# Patient Record
Sex: MALE | Race: BLACK OR AFRICAN AMERICAN | NOT HISPANIC OR LATINO | Employment: FULL TIME | URBAN - METROPOLITAN AREA
[De-identification: names, ages, dates, MRNs, and addresses within clinical notes are randomized per-mention and may not be internally consistent; named-entity substitution may affect disease eponyms.]

---

## 2017-08-29 ENCOUNTER — OFFICE VISIT (OUTPATIENT)
Dept: ENDOCRINOLOGY | Facility: CLINIC | Age: 57
End: 2017-08-29
Payer: COMMERCIAL

## 2017-08-29 ENCOUNTER — LAB VISIT (OUTPATIENT)
Dept: LAB | Facility: HOSPITAL | Age: 57
End: 2017-08-29
Payer: COMMERCIAL

## 2017-08-29 ENCOUNTER — PATIENT MESSAGE (OUTPATIENT)
Dept: ENDOCRINOLOGY | Facility: CLINIC | Age: 57
End: 2017-08-29

## 2017-08-29 VITALS
BODY MASS INDEX: 33.49 KG/M2 | SYSTOLIC BLOOD PRESSURE: 126 MMHG | HEIGHT: 78 IN | HEART RATE: 64 BPM | DIASTOLIC BLOOD PRESSURE: 80 MMHG | WEIGHT: 289.44 LBS

## 2017-08-29 DIAGNOSIS — C73 MALIGNANT NEOPLASM OF THYROID GLAND: ICD-10-CM

## 2017-08-29 DIAGNOSIS — E89.0 POST-SURGICAL HYPOTHYROIDISM: Primary | ICD-10-CM

## 2017-08-29 DIAGNOSIS — E66.9 OBESITY (BMI 30.0-34.9): ICD-10-CM

## 2017-08-29 DIAGNOSIS — E89.0 POST-SURGICAL HYPOTHYROIDISM: ICD-10-CM

## 2017-08-29 PROBLEM — E66.811 OBESITY (BMI 30.0-34.9): Status: ACTIVE | Noted: 2017-08-29

## 2017-08-29 LAB
T4 FREE SERPL-MCNC: 1.01 NG/DL
TSH SERPL DL<=0.005 MIU/L-ACNC: 5.29 UIU/ML

## 2017-08-29 PROCEDURE — 99999 PR PBB SHADOW E&M-EST. PATIENT-LVL III: CPT | Mod: PBBFAC,,, | Performed by: INTERNAL MEDICINE

## 2017-08-29 PROCEDURE — 86800 THYROGLOBULIN ANTIBODY: CPT

## 2017-08-29 PROCEDURE — 99214 OFFICE O/P EST MOD 30 MIN: CPT | Mod: S$GLB,,, | Performed by: INTERNAL MEDICINE

## 2017-08-29 PROCEDURE — 84439 ASSAY OF FREE THYROXINE: CPT

## 2017-08-29 PROCEDURE — 36415 COLL VENOUS BLD VENIPUNCTURE: CPT

## 2017-08-29 PROCEDURE — 84443 ASSAY THYROID STIM HORMONE: CPT

## 2017-08-29 PROCEDURE — 3008F BODY MASS INDEX DOCD: CPT | Mod: S$GLB,,, | Performed by: INTERNAL MEDICINE

## 2017-08-29 NOTE — PROGRESS NOTES
"Subjective:      Patient ID: Zaheer Neal is a 57 y.o. male.    Chief Complaint: Thyroid Cancer     is presenting for follow up of thyroid cancer, post-surgical hypothyroidism.    Last seen in 4/2016 by Lily José was endocrinologist in [facility]  Presented with thyroid nodule found on PE     He was diagnosed with thyroid cancer 12/2011 [place]       Age of dx 51  Unifocal right lobe    3.5x 2.0x 1.5 cm    Classical PTC    Margins uninvolved    extrathyroidal extension; present near flap of skeletal muscle  No LN looked at pNx   pT3, Nx, Mx       S/p I131 153 mci 4/2012 4/25/12  Post i131 scan focal uptake right thyroid bed    focus sup midline neck     Current symptoms    No Weight gain  No Heat/ Cold intolerance    No diarrhea/ Constipation     current medication lt4 137 mcg brand name synthroid (hives with generic)  Takes it properly          Denies polyuria, polydipsia, nocturia,   Gain 8-10 lbs over the past few years  No palpitations  No night sweats          3/18/15 u/s    Status post thyroidectomy.  No sonographic evidence of malignancy.      Works for [employer] and [employer]  Walks 2 miles per day    Review of Systems   Constitutional: Negative for unexpected weight change.   Eyes: Negative for visual disturbance.   Respiratory: Negative for shortness of breath.    Cardiovascular: Negative for chest pain.   Gastrointestinal: Negative for abdominal pain.   Musculoskeletal: Negative for myalgias.   Skin: Negative for wound.   Neurological: Negative for headaches.   Hematological: Does not bruise/bleed easily.   Psychiatric/Behavioral: Negative for sleep disturbance.       Objective:     Ht 6' 6" (1.981 m)   Wt 131.3 kg (289 lb 7.4 oz)   BMI 33.45 kg/m²      Physical Exam   Constitutional: He appears well-developed and well-nourished.   Neck: Normal range of motion. No thyromegaly present.   Cardiovascular: Normal rate and regular rhythm.  "   Skin: Skin is warm and dry.   Vitals reviewed.      Assessment:     1. Post-surgical hypothyroidism    2. Malignant neoplasm of thyroid gland    3. Obesity (BMI 30.0-34.9)        Plan:   1 Stage 3 Thyroid cancer- s/p surgery and i131 therapy   DEBORAH intermediate risk given extra thyroidal extension  Recheck tsh and thyroglobulin  tsh goal 0.5-1.0  Repeat thyroid US     2 Postsurgical hypothyroidism -    Goal is a low -normal TSH  Avoid overt exogenous hyperthyroidism as this can accelerate bone loss and increase risk of CV complications.     3  Obesity - alerted as to the increased risk for t2DM  Stressed diet and exercise  Periodic hba1c levels per pcp         Follow-up in 1 year    Discussed with Dr. Eid    Send synthroid  To [facility]  90 day refill    Rick Jha MD

## 2017-08-30 ENCOUNTER — TELEPHONE (OUTPATIENT)
Dept: ENDOCRINOLOGY | Facility: CLINIC | Age: 57
End: 2017-08-30

## 2017-08-30 ENCOUNTER — HOSPITAL ENCOUNTER (OUTPATIENT)
Dept: ENDOCRINOLOGY | Facility: CLINIC | Age: 57
Discharge: HOME | End: 2017-08-30
Attending: INTERNAL MEDICINE
Payer: COMMERCIAL

## 2017-08-30 DIAGNOSIS — C73 MALIGNANT NEOPLASM OF THYROID GLAND: ICD-10-CM

## 2017-08-30 DIAGNOSIS — E89.0 POST-SURGICAL HYPOTHYROIDISM: ICD-10-CM

## 2017-08-30 DIAGNOSIS — E89.0 POST-SURGICAL HYPOTHYROIDISM: Primary | ICD-10-CM

## 2017-08-30 LAB
THRYOGLOBULIN INTERPRETATION: NORMAL
THYROGLOB AB SERPL-ACNC: <1.8 IU/ML
THYROGLOB SERPL-MCNC: <0.1 NG/ML

## 2017-08-30 PROCEDURE — 76536 US EXAM OF HEAD AND NECK: CPT | Mod: S$GLB,,, | Performed by: INTERNAL MEDICINE

## 2017-08-30 RX ORDER — LEVOTHYROXINE SODIUM 150 UG/1
150 TABLET ORAL DAILY
Qty: 90 TABLET | Refills: 3 | Status: SHIPPED | OUTPATIENT
Start: 2017-08-30 | End: 2018-03-07

## 2017-09-04 NOTE — PROGRESS NOTES
I, Tara Eid, have personally taken the history and physical exam and I agree with Dr. Jha's assessment and plan

## 2017-09-26 ENCOUNTER — CLINICAL SUPPORT (OUTPATIENT)
Dept: OTHER | Facility: CLINIC | Age: 57
End: 2017-09-26
Payer: COMMERCIAL

## 2017-09-26 VITALS
DIASTOLIC BLOOD PRESSURE: 75 MMHG | SYSTOLIC BLOOD PRESSURE: 116 MMHG | HEIGHT: 78 IN | BODY MASS INDEX: 32.86 KG/M2 | WEIGHT: 284 LBS

## 2017-09-26 DIAGNOSIS — Z00.8 HEALTH EXAMINATION IN POPULATION SURVEYS: Primary | ICD-10-CM

## 2017-09-26 LAB
GLUCOSE SERPL-MCNC: NORMAL MG/DL (ref 60–140)
POC CHOLESTEROL, HDL: 44 MG/DL (ref 40–?)
POC CHOLESTEROL, LDL: 102 MG/DL (ref ?–160)
POC CHOLESTEROL, TOTAL: 172 MG/DL (ref ?–240)
POC GLUCOSE FASTING: 97 MG/DL (ref 60–110)
POC TOTAL CHOLESTEROL / HDL RATIO: 3.91 (ref ?–6)
POC TRIGLYCERIDES: 126 MG/DL (ref ?–160)

## 2017-09-26 PROCEDURE — 80061 LIPID PANEL: CPT | Mod: QW,S$GLB,, | Performed by: INTERNAL MEDICINE

## 2017-09-26 PROCEDURE — 99401 PREV MED CNSL INDIV APPRX 15: CPT | Mod: S$GLB,,, | Performed by: INTERNAL MEDICINE

## 2017-09-26 PROCEDURE — 82947 ASSAY GLUCOSE BLOOD QUANT: CPT | Mod: QW,S$GLB,, | Performed by: INTERNAL MEDICINE

## 2017-10-11 ENCOUNTER — LAB VISIT (OUTPATIENT)
Dept: LAB | Facility: HOSPITAL | Age: 57
End: 2017-10-11
Attending: INTERNAL MEDICINE
Payer: COMMERCIAL

## 2017-10-11 DIAGNOSIS — E89.0 POST-SURGICAL HYPOTHYROIDISM: ICD-10-CM

## 2017-10-11 LAB — TSH SERPL DL<=0.005 MIU/L-ACNC: 1.63 UIU/ML

## 2017-10-11 PROCEDURE — 84443 ASSAY THYROID STIM HORMONE: CPT | Mod: PO

## 2017-10-11 PROCEDURE — 36415 COLL VENOUS BLD VENIPUNCTURE: CPT | Mod: PO

## 2017-12-12 ENCOUNTER — LAB VISIT (OUTPATIENT)
Dept: LAB | Facility: HOSPITAL | Age: 57
End: 2017-12-12
Attending: FAMILY MEDICINE
Payer: COMMERCIAL

## 2017-12-12 ENCOUNTER — OFFICE VISIT (OUTPATIENT)
Dept: INTERNAL MEDICINE | Facility: CLINIC | Age: 57
End: 2017-12-12
Attending: FAMILY MEDICINE
Payer: COMMERCIAL

## 2017-12-12 VITALS
BODY MASS INDEX: 31.89 KG/M2 | WEIGHT: 275.63 LBS | SYSTOLIC BLOOD PRESSURE: 124 MMHG | HEIGHT: 78 IN | DIASTOLIC BLOOD PRESSURE: 82 MMHG

## 2017-12-12 DIAGNOSIS — Z00.00 ANNUAL PHYSICAL EXAM: Primary | ICD-10-CM

## 2017-12-12 DIAGNOSIS — Z12.5 PROSTATE CANCER SCREENING: ICD-10-CM

## 2017-12-12 DIAGNOSIS — E89.0 POST-SURGICAL HYPOTHYROIDISM: ICD-10-CM

## 2017-12-12 DIAGNOSIS — C73 MALIGNANT NEOPLASM OF THYROID GLAND: ICD-10-CM

## 2017-12-12 DIAGNOSIS — Z12.11 COLON CANCER SCREENING: ICD-10-CM

## 2017-12-12 DIAGNOSIS — Z00.00 ANNUAL PHYSICAL EXAM: ICD-10-CM

## 2017-12-12 LAB
ANION GAP SERPL CALC-SCNC: 7 MMOL/L
BILIRUB UR QL STRIP: NEGATIVE
BUN SERPL-MCNC: 13 MG/DL
CALCIUM SERPL-MCNC: 9.6 MG/DL
CHLORIDE SERPL-SCNC: 104 MMOL/L
CHOLEST SERPL-MCNC: 160 MG/DL
CHOLEST/HDLC SERPL: 4 {RATIO}
CLARITY UR REFRACT.AUTO: ABNORMAL
CO2 SERPL-SCNC: 30 MMOL/L
COLOR UR AUTO: YELLOW
COMPLEXED PSA SERPL-MCNC: 0.72 NG/ML
CREAT SERPL-MCNC: 1.2 MG/DL
EST. GFR  (AFRICAN AMERICAN): >60 ML/MIN/1.73 M^2
EST. GFR  (NON AFRICAN AMERICAN): >60 ML/MIN/1.73 M^2
GLUCOSE SERPL-MCNC: 103 MG/DL
GLUCOSE UR QL STRIP: NEGATIVE
HDLC SERPL-MCNC: 40 MG/DL
HDLC SERPL: 25 %
HGB UR QL STRIP: NEGATIVE
KETONES UR QL STRIP: NEGATIVE
LDLC SERPL CALC-MCNC: 100.2 MG/DL
LEUKOCYTE ESTERASE UR QL STRIP: NEGATIVE
MICROSCOPIC COMMENT: NORMAL
NITRITE UR QL STRIP: NEGATIVE
NONHDLC SERPL-MCNC: 120 MG/DL
PH UR STRIP: 7 [PH] (ref 5–8)
POTASSIUM SERPL-SCNC: 4.7 MMOL/L
PROT UR QL STRIP: NEGATIVE
SODIUM SERPL-SCNC: 141 MMOL/L
SP GR UR STRIP: 1.02 (ref 1–1.03)
TRIGL SERPL-MCNC: 99 MG/DL
URN SPEC COLLECT METH UR: ABNORMAL
UROBILINOGEN UR STRIP-ACNC: NEGATIVE EU/DL

## 2017-12-12 PROCEDURE — 99999 PR PBB SHADOW E&M-EST. PATIENT-LVL III: CPT | Mod: PBBFAC,,, | Performed by: FAMILY MEDICINE

## 2017-12-12 PROCEDURE — 99396 PREV VISIT EST AGE 40-64: CPT | Mod: S$GLB,,, | Performed by: FAMILY MEDICINE

## 2017-12-12 PROCEDURE — 36415 COLL VENOUS BLD VENIPUNCTURE: CPT

## 2017-12-12 PROCEDURE — 80061 LIPID PANEL: CPT

## 2017-12-12 PROCEDURE — 80048 BASIC METABOLIC PNL TOTAL CA: CPT

## 2017-12-12 PROCEDURE — 81001 URINALYSIS AUTO W/SCOPE: CPT

## 2017-12-12 PROCEDURE — 84153 ASSAY OF PSA TOTAL: CPT

## 2017-12-12 NOTE — PROGRESS NOTES
Subjective:       Patient ID: Zaheer Neal is a 57 y.o. male.    Chief Complaint: Annual Exam    Established patient for an annual wellness check/physical exam. No specific complaints, please see ROS.        Review of Systems   Constitutional: Negative for activity change, chills, fatigue, fever and unexpected weight change.   HENT: Negative for congestion, hearing loss, rhinorrhea and trouble swallowing.    Eyes: Negative for discharge, redness and visual disturbance.   Respiratory: Negative for cough, chest tightness, shortness of breath and wheezing.    Cardiovascular: Negative for chest pain, palpitations and leg swelling.   Gastrointestinal: Negative for abdominal pain, blood in stool, constipation, diarrhea and vomiting.   Endocrine: Positive for polyuria. Negative for polydipsia.   Genitourinary: Negative for difficulty urinating, hematuria and urgency.   Musculoskeletal: Positive for arthralgias. Negative for back pain, gait problem, joint swelling, myalgias and neck pain.   Skin: Negative for color change and rash.   Neurological: Negative for tremors, speech difficulty, weakness, numbness and headaches.   Hematological: Negative for adenopathy. Does not bruise/bleed easily.   Psychiatric/Behavioral: Negative for behavioral problems, confusion, dysphoric mood and sleep disturbance. The patient is not nervous/anxious.        Objective:      Physical Exam   Constitutional: He appears well-developed and well-nourished.   HENT:   Head: Normocephalic and atraumatic.   Right Ear: Tympanic membrane, external ear and ear canal normal.   Left Ear: Tympanic membrane, external ear and ear canal normal.   Mouth/Throat: Oropharynx is clear and moist.   Eyes: Pupils are equal, round, and reactive to light. No scleral icterus.   Neck: Normal range of motion. Neck supple. Carotid bruit is not present. No thyromegaly present.   Cardiovascular: Normal rate, regular rhythm, normal heart sounds and intact distal  pulses.  Exam reveals no gallop and no friction rub.    No murmur heard.  Pulmonary/Chest: Effort normal and breath sounds normal.   Abdominal: Soft. Bowel sounds are normal. He exhibits no distension and no mass. There is no tenderness. There is no rebound and no guarding. Hernia confirmed negative in the right inguinal area and confirmed negative in the left inguinal area.   Genitourinary: Rectum normal, prostate normal, testes normal and penis normal. Right testis shows no mass and no tenderness. Left testis shows no mass and no tenderness.   Musculoskeletal: Normal range of motion. He exhibits no edema or tenderness.   Lymphadenopathy:     He has no cervical adenopathy.        Right: No inguinal adenopathy present.        Left: No inguinal adenopathy present.   Neurological: He is alert. He has normal strength. He displays normal reflexes. No cranial nerve deficit or sensory deficit. Coordination and gait normal.   Skin: Skin is warm and dry.   Psychiatric: He has a normal mood and affect. His behavior is normal. Judgment and thought content normal.   Nursing note and vitals reviewed.      Assessment:       1. Annual physical exam    2. Prostate cancer screening    3. Colon cancer screening    4. Malignant neoplasm of thyroid gland    5. Post-surgical hypothyroidism        Plan:   Zaheer was seen today for annual exam.    Diagnoses and all orders for this visit:    Annual physical exam  -     PSA, Screening; Future  -     Basic metabolic panel; Future  -     Lipid panel; Future  -     Urinalysis Microscopic  -     Urinalysis    Prostate cancer screening  -     PSA, Screening; Future    Colon cancer screening  -     Case request GI: COLONOSCOPY    Malignant neoplasm of thyroid gland    Post-surgical hypothyroidism  Comments:  followed in endocrinology - recent labs noted      See meds, orders, follow up, routing and instructions sections of encounter.

## 2018-01-08 DIAGNOSIS — Z12.11 SPECIAL SCREENING FOR MALIGNANT NEOPLASMS, COLON: Primary | ICD-10-CM

## 2018-01-08 RX ORDER — POLYETHYLENE GLYCOL 3350, SODIUM SULFATE ANHYDROUS, SODIUM BICARBONATE, SODIUM CHLORIDE, POTASSIUM CHLORIDE 236; 22.74; 6.74; 5.86; 2.97 G/4L; G/4L; G/4L; G/4L; G/4L
4 POWDER, FOR SOLUTION ORAL ONCE
Qty: 4000 ML | Refills: 0 | Status: SHIPPED | OUTPATIENT
Start: 2018-01-08 | End: 2018-01-08

## 2018-02-05 ENCOUNTER — HOSPITAL ENCOUNTER (OUTPATIENT)
Facility: HOSPITAL | Age: 58
Discharge: HOME | End: 2018-02-05
Attending: COLON & RECTAL SURGERY | Admitting: COLON & RECTAL SURGERY
Payer: COMMERCIAL

## 2018-02-05 ENCOUNTER — ANESTHESIA (OUTPATIENT)
Dept: ENDOSCOPY | Facility: HOSPITAL | Age: 58
End: 2018-02-05
Payer: COMMERCIAL

## 2018-02-05 ENCOUNTER — ANESTHESIA EVENT (OUTPATIENT)
Dept: ENDOSCOPY | Facility: HOSPITAL | Age: 58
End: 2018-02-05
Payer: COMMERCIAL

## 2018-02-05 ENCOUNTER — SURGERY (OUTPATIENT)
Age: 58
End: 2018-02-05

## 2018-02-05 VITALS
DIASTOLIC BLOOD PRESSURE: 70 MMHG | HEART RATE: 66 BPM | BODY MASS INDEX: 32.17 KG/M2 | SYSTOLIC BLOOD PRESSURE: 128 MMHG | TEMPERATURE: 98 F | OXYGEN SATURATION: 98 % | RESPIRATION RATE: 18 BRPM | WEIGHT: 278 LBS | HEIGHT: 78 IN

## 2018-02-05 DIAGNOSIS — Z12.11 SCREENING FOR COLON CANCER: ICD-10-CM

## 2018-02-05 PROCEDURE — D9220A PRA ANESTHESIA: Mod: ANES,,, | Performed by: ANESTHESIOLOGY

## 2018-02-05 PROCEDURE — 37000008 HC ANESTHESIA 1ST 15 MINUTES: Performed by: COLON & RECTAL SURGERY

## 2018-02-05 PROCEDURE — 37000009 HC ANESTHESIA EA ADD 15 MINS: Performed by: COLON & RECTAL SURGERY

## 2018-02-05 PROCEDURE — 25000003 PHARM REV CODE 250: Performed by: NURSE PRACTITIONER

## 2018-02-05 PROCEDURE — G0105 COLORECTAL SCRN; HI RISK IND: HCPCS | Mod: ,,, | Performed by: COLON & RECTAL SURGERY

## 2018-02-05 PROCEDURE — 63600175 PHARM REV CODE 636 W HCPCS: Performed by: NURSE ANESTHETIST, CERTIFIED REGISTERED

## 2018-02-05 PROCEDURE — D9220A PRA ANESTHESIA: Mod: CRNA,,, | Performed by: NURSE ANESTHETIST, CERTIFIED REGISTERED

## 2018-02-05 PROCEDURE — G0105 COLORECTAL SCRN; HI RISK IND: HCPCS | Performed by: COLON & RECTAL SURGERY

## 2018-02-05 RX ORDER — LIDOCAINE HCL/PF 100 MG/5ML
SYRINGE (ML) INTRAVENOUS
Status: DISCONTINUED | OUTPATIENT
Start: 2018-02-05 | End: 2018-02-05

## 2018-02-05 RX ORDER — SODIUM CHLORIDE 9 MG/ML
INJECTION, SOLUTION INTRAVENOUS CONTINUOUS
Status: DISCONTINUED | OUTPATIENT
Start: 2018-02-05 | End: 2018-02-05 | Stop reason: HOSPADM

## 2018-02-05 RX ORDER — PROPOFOL 10 MG/ML
VIAL (ML) INTRAVENOUS
Status: DISCONTINUED | OUTPATIENT
Start: 2018-02-05 | End: 2018-02-05

## 2018-02-05 RX ADMIN — PROPOFOL 50 MG: 10 INJECTION, EMULSION INTRAVENOUS at 09:02

## 2018-02-05 RX ADMIN — LIDOCAINE HYDROCHLORIDE 50 MG: 20 INJECTION, SOLUTION INTRAVENOUS at 09:02

## 2018-02-05 RX ADMIN — PROPOFOL 25 MG: 10 INJECTION, EMULSION INTRAVENOUS at 09:02

## 2018-02-05 RX ADMIN — SODIUM CHLORIDE: 0.9 INJECTION, SOLUTION INTRAVENOUS at 08:02

## 2018-02-05 NOTE — PROVATION PATIENT INSTRUCTIONS
Discharge Summary/Instructions after an Endoscopic Procedure  Patient Name: Zaheer Neal  Patient MRN: [id number]  Patient YOB: 1960 Monday, February 05, 2018  Philip Hitchcock MD  RESTRICTIONS:  During your procedure today, you received medications for sedation.  These   medications may affect your judgment, balance and coordination.  Therefore,   for 24 hours, you have the following restrictions:   - DO NOT drive a car, operate machinery, make legal/financial decisions,   sign important papers or drink alcohol.    ACTIVITY:  The following day: return to full activity including work, except no heavy   lifting, straining or running for 3 days if polyps were removed.  DIET:  Eat and drink normally unless instructed otherwise.     TREATMENT FOR COMMON SIDE EFFECTS:  - Mild abdominal pain, belching, bloating or excessive gas: rest, eat   lightly and use a heating pad.  - Sore Throat: treat with throat lozenges and/or gargle with warm salt   water.  SYMPTOMS TO WATCH FOR AND REPORT TO YOUR PHYSICIAN:  1. Abdominal pain or bloating, other than gas cramps.  2. Chest pain.  3. Back pain.  4. Chills or fever occurring within 24 hours after the procedure.  5. Rectal bleeding, which would show as bright red, maroon, or black stools.   (A tablespoon of blood from the rectum is not serious, especially if   hemorrhoids are present.)  6. Vomiting.  7. Weakness or dizziness.  8. Because air was used during the procedure, expelling large amounts of air   from your rectum or belching is normal.  9. If a bowel prep was taken, you may not have a bowel movement for 1-3   days.  This is normal.  GO DIRECTLY TO THE NEAREST EMERGENCY ROOM IF YOU HAVE ANY OF THE FOLLOWING:      Difficulty breathing  Chills and/or fever over 101 F   Persistent vomiting and/or vomiting blood   Severe abdominal pain   Severe chest pain   Black, tarry stools   Bleeding- more than one tablespoon   Any other symptom or condition that you may feel needs  urgent attention  Your doctor recommends these additional instructions:  If any biopsies were taken, your doctor s clinic will contact you in 1 to 2   weeks with any results.  You are being discharged to home.   You have a contact number available for emergencies.  The signs and symptoms   of potential delayed complications were discussed with you.  You may return   to normal activities tomorrow.  Written discharge instructions were   provided to you.   Eat a high fiber diet for the rest of your life.   Continue your present medications.   Your physician has recommended a repeat colonoscopy in five years for   surveillance.  For questions, problems or results please call your physician - Philip Hitchcock MD at Work:  [phone].  [facility], EMERGENCY ROOM PHONE NUMBER: [phone]  IF A COMPLICATION OR EMERGENCY SITUATION ARISES AND YOU ARE UNABLE TO REACH   YOUR PHYSICIAN - GO DIRECTLY TO THE EMERGENCY ROOM.  Philip Hitchcock MD  2/5/2018 9:57:03 AM  This report has been verified and signed electronically.

## 2018-02-05 NOTE — DISCHARGE INSTRUCTIONS
Colonoscopy     A camera attached to a flexible tube with a viewing lens is used to take video pictures.     Colonoscopy is a test to view the inside of your lower digestive tract (colon and rectum). Sometimes it can show the last part of the small intestine (ileum). During the test, small pieces of tissue may be removed for testing. This is called a biopsy. Small growths, such as polyps, may also be removed.   Why is colonoscopy done?  The test is done to help look for colon cancer. And it can help find the source of abdominal pain, bleeding, and changes in bowel habits. It may be needed once a year, depending on factors such as your:  · Age  · Health history  · Family health history  · Symptoms  · Results from any prior colonoscopy  Risks and possible complications  These include:  · Bleeding               · A puncture or tear in the colon   · Risks of anesthesia  · A cancer lesion not being seen  Getting ready   To prepare for the test:  · Talk with your healthcare provider about the risks of the test (see below). Also ask your healthcare provider about alternatives to the test.  · Tell your healthcare provider about any medicines you take. Also tell him or her about any health conditions you may have.  · Make sure your rectum and colon are empty for the test. Follow the diet and bowel prep instructions exactly. If you dont, the test may need to be rescheduled.  · Plan for a friend or family member to drive you home after the test.     Colonoscopy provides an inside view of the entire colon.     You may discuss the results with your doctor right away or at a future visit.  During the test   The test is usually done in the hospital on an outpatient basis. This means you go home the same day. The procedure takes about 30 minutes. During that time:  · You are given relaxing (sedating) medicine through an IV line. You may be drowsy, or fully asleep.  · The healthcare provider will first give you a physical exam to  check for anal and rectal problems.  · Then the anus is lubricated and the scope inserted.  · If you are awake, you may have a feeling similar to needing to have a bowel movement. You may also feel pressure as air is pumped into the colon. Its OK to pass gas during the procedure.  · Biopsy, polyp removal, or other treatments may be done during the test.  After the test   You may have gas right after the test. It can help to try to pass it to help prevent later bloating. Your healthcare provider may discuss the results with you right away. Or you may need to schedule a follow-up visit to talk about the results. After the test, you can go back to your normal eating and other activities. You may be tired from the sedation and need to rest for a few hours.  Date Last Reviewed: 11/1/2016 © 2000-2017 [employer]. [address] [place] [address], [place] [address]. All rights reserved. This information is not intended as a substitute for professional medical care. Always follow your healthcare professional's instructions.

## 2018-02-05 NOTE — H&P
Endoscopy H&P    Procedure : Colonoscopy      asymptomatic screening exam and most recent endoscopic exam 10 years ago in [place] - history of benign polyps. No family history. Previous LGIB and AVM with cauterization. No recent bleeding.       Past Medical History:   Diagnosis Date    Colonic polyp     Malignant neoplasm of thyroid gland     thyroid    Obesity     Post-surgical hypothyroidism     PUD (peptic ulcer disease)     Thyroid cancer     Thyroid cancer      Sedation Problems: NO  Family History   Problem Relation Age of Onset    Breast cancer Mother     Cancer Mother      breast    Breast cancer Sister     Cancer Sister      breast    Heart disease Father 64     MI    Diabetes Neg Hx     Thyroid cancer Neg Hx      Fam Hx of Sedation Problems: NO  Social History     Social History    Marital status:      Spouse name: Aubrie    Number of children: 2    Years of education: N/A     Occupational History    Not on file.     Social History Main Topics    Smoking status: Never Smoker    Smokeless tobacco: Never Used    Alcohol use No      Comment: rarely     Drug use: No    Sexual activity: Yes     Partners: Female     Other Topics Concern    Not on file     Social History Narrative    RM x 3, 2 kids,  [place] [employer], played [employer]. [employer] for college. Born in [facility]       Review of Systems - Negative    Respiratory ROS: no cough, shortness of breath, or wheezing  Cardiovascular ROS: no chest pain or dyspnea on exertion  Gastrointestinal ROS: no abdominal pain, change in bowel habits, or black or bloody stools  Musculoskeletal ROS: negative  Neurological ROS: no TIA or stroke symptoms        Physical Exam:  General: no distress  Head: normocephalic  Airway:  normal oropharynx, airway normal  Neck: supple, symmetrical, trachea midline  Lungs:  normal respiratory effort  Heart:  regular rate and rhythm, S1, S2 normal, no murmur, rub or gallop  Abdomen: soft, non-tender non-distented; bowel sounds normal; no masses,  no organomegaly  Extremities: no cyanosis or edema, or clubbing       Deep Sedation: Mallampati Score per anesthesia     SedationPlan :Gen     ASA : II

## 2018-02-05 NOTE — ANESTHESIA POSTPROCEDURE EVALUATION
"Anesthesia Post Evaluation    Patient: Zaheer Neal    Procedure(s) Performed: Procedure(s) (LRB):  COLONOSCOPY (N/A)    Final Anesthesia Type: general  Patient location during evaluation: PACU  Patient participation: Yes- Able to Participate  Level of consciousness: awake and alert and oriented  Post-procedure vital signs: reviewed and stable  Pain management: adequate  Airway patency: patent  PONV status at discharge: No PONV  Anesthetic complications: no      Cardiovascular status: hemodynamically stable  Respiratory status: unassisted  Hydration status: euvolemic  Follow-up not needed.        Visit Vitals  BP (!) 120/57 (BP Location: Left arm, Patient Position: Sitting)   Pulse 69   Temp 36.7 °C (98.1 °F) (Oral)   Resp 20   Ht 6' 6" (1.981 m)   Wt 126.1 kg (278 lb)   SpO2 99%   BMI 32.13 kg/m²       Pain/Buffy Score: Pain Assessment Performed: Yes (2/5/2018 10:15 AM)  Presence of Pain: denies (2/5/2018 10:15 AM)  Buffy Score: 10 (2/5/2018 10:15 AM)      "

## 2018-02-05 NOTE — TRANSFER OF CARE
"Anesthesia Transfer of Care Note    Patient: Zaheer Neal    Procedure(s) Performed: Procedure(s) (LRB):  COLONOSCOPY (N/A)    Patient location: PACU    Anesthesia Type: general    Transport from OR: Transported from OR on room air with adequate spontaneous ventilation    Post pain: adequate analgesia    Post assessment: no apparent anesthetic complications and tolerated procedure well    Post vital signs: stable    Level of consciousness: awake, alert and oriented    Nausea/Vomiting: no nausea/vomiting    Complications: none    Transfer of care protocol was followed      Last vitals:   Visit Vitals  BP (!) 150/76   Pulse 73   Temp 36.5 °C (97.7 °F) (Oral)   Resp 18   Ht 6' 6" (1.981 m)   Wt 126.1 kg (278 lb)   SpO2 100%   BMI 32.13 kg/m²     "

## 2018-02-05 NOTE — ANESTHESIA PREPROCEDURE EVALUATION
2018  Zaheer Neal is a 57 y.o., male.  Pre-operative evaluation for COLONOSCOPY (N/A)    Chief Complaint: colon screen    PMH: obesity  Thyroid cancer, s/p thyroidectomy    Past Surgical History:   Procedure Laterality Date    KNEE SURGERY  2013    right knee-includes scope    THYROIDECTOMY           Vital Signs Range (Last 24H):         CBC:   No results for input(s): WBC, RBC, HGB, HCT, PLT, MCV, MCH, MCHC in the last 720 hours.    CMP: No results for input(s): NA, K, CL, CO2, BUN, CREATININE, GLU, MG, PHOS, CALCIUM, ALBUMIN, PROT, ALKPHOS, ALT, AST, BILITOT in the last 720 hours.    INR:  No results for input(s): PT, INR, PROTIME, APTT in the last 720 hours.      Diagnostic Studies:      EKD Echo:  Anesthesia Evaluation    I have reviewed the Patient Summary Reports.    I have reviewed the Nursing Notes.   I have reviewed the Medications.     Review of Systems  Anesthesia Hx:  No problems with previous Anesthesia    Social:  Non-Smoker    Cardiovascular:  Cardiovascular Normal     Pulmonary:  Pulmonary Normal    Neurological:  Neurology Normal        Physical Exam  General:  Obesity    Airway/Jaw/Neck:  Airway Findings: Mouth Opening: Normal Tongue: Normal  General Airway Assessment: Good  Mallampati: I  TM Distance: Normal, at least 6 cm  Jaw/Neck Findings:  Neck ROM: Normal ROM      Dental:  Dental Findings: In tact   Chest/Lungs:  Chest/Lungs Findings: Clear to auscultation, Normal Respiratory Rate     Heart/Vascular:  Heart Findings: Rate: Normal  Rhythm: Regular Rhythm  Sounds: Normal        Mental Status:  Mental Status Findings:  Cooperative, Alert and Oriented         Anesthesia Plan  Type of Anesthesia, risks & benefits discussed:  Anesthesia Type:  general  Patient's Preference:   Intra-op Monitoring Plan:   Intra-op Monitoring Plan Comments:   Post Op Pain Control  Plan:   Post Op Pain Control Plan Comments:   Induction:   IV  Beta Blocker:  Patient is not currently on a Beta-Blocker (No further documentation required).       Informed Consent: Patient understands risks and agrees with Anesthesia plan.  Questions answered. Anesthesia consent signed with patient.  ASA Score: 2     Day of Surgery Review of History & Physical:    H&P update referred to the surgeon.         Ready For Surgery From Anesthesia Perspective.

## 2018-02-12 ENCOUNTER — TELEPHONE (OUTPATIENT)
Dept: ENDOSCOPY | Facility: HOSPITAL | Age: 58
End: 2018-02-12

## 2018-02-15 ENCOUNTER — PATIENT MESSAGE (OUTPATIENT)
Dept: ENDOCRINOLOGY | Facility: CLINIC | Age: 58
End: 2018-02-15

## 2018-02-19 ENCOUNTER — PATIENT MESSAGE (OUTPATIENT)
Dept: ENDOCRINOLOGY | Facility: HOSPITAL | Age: 58
End: 2018-02-19

## 2018-02-22 ENCOUNTER — TELEPHONE (OUTPATIENT)
Dept: ENDOCRINOLOGY | Facility: CLINIC | Age: 58
End: 2018-02-22

## 2018-02-22 NOTE — TELEPHONE ENCOUNTER
----- Message from Maria Ines Johnson sent at 2/22/2018 10:58 AM CST -----  Contact: Self  Pt called to schedule an appt per Dr Jha notes for 3/1. Can you please schedule and call.    Thanks

## 2018-03-01 ENCOUNTER — OFFICE VISIT (OUTPATIENT)
Dept: ENDOCRINOLOGY | Facility: CLINIC | Age: 58
End: 2018-03-01
Payer: COMMERCIAL

## 2018-03-01 VITALS
WEIGHT: 278.25 LBS | SYSTOLIC BLOOD PRESSURE: 124 MMHG | DIASTOLIC BLOOD PRESSURE: 80 MMHG | HEART RATE: 74 BPM | BODY MASS INDEX: 32.19 KG/M2 | HEIGHT: 78 IN

## 2018-03-01 DIAGNOSIS — L50.9 HIVES: Primary | ICD-10-CM

## 2018-03-01 DIAGNOSIS — C73 MALIGNANT NEOPLASM OF THYROID GLAND: ICD-10-CM

## 2018-03-01 DIAGNOSIS — E66.9 OBESITY (BMI 30.0-34.9): ICD-10-CM

## 2018-03-01 DIAGNOSIS — E89.0 POST-SURGICAL HYPOTHYROIDISM: ICD-10-CM

## 2018-03-01 PROCEDURE — 99999 PR PBB SHADOW E&M-EST. PATIENT-LVL III: CPT | Mod: PBBFAC,,, | Performed by: INTERNAL MEDICINE

## 2018-03-01 PROCEDURE — 99214 OFFICE O/P EST MOD 30 MIN: CPT | Mod: S$GLB,,, | Performed by: INTERNAL MEDICINE

## 2018-03-01 NOTE — PROGRESS NOTES
Subjective:      Patient ID: Zaheer Neal is a 57 y.o. male.    Chief Complaint: Maliganant neoplasm of thyroid gland     is presenting for follow up of thyroid cancer.    Last seen in 8/2017. Follow-up sooner than scheduled visit because of recent hives that began in Feb 2018  Went to urgent care and received steroids shot and steroid taper in mid February  Has been taking zyrtec and benadryl since urgent care visit without significant relief in symptoms      Dr. José was endocrinologist in [facility]  Presented with thyroid nodule found on PE     He was diagnosed with thyroid cancer 12/2011 [place]       Age of dx 51  Unifocal right lobe    3.5x 2.0x 1.5 cm    Classical PTC    Margins uninvolved    extrathyroidal extension; present near flap of skeletal muscle  No LN looked at pNx   pT3, Nx, Mx       S/p I131 153 mci 4/2012 4/25/12  Post i131 scan focal uptake right thyroid bed    focus sup midline neck     Current symptoms    No Weight gain  No Heat/ Cold intolerance    No diarrhea/ Constipation     current medication lt4 150 mcg brand name synthroid (hives with generic)  Takes it properly. Has been taking half dose some days in February to see if this would improve his hives       Denies polyuria, polydipsia, nocturia,   No palpitations  No night sweats           8/2017 u/s    Status post thyroidectomy.  There is no sonographic evidence of malignancy in this patient with an undetectable thyroglobulin level.     Works for [employer] [place]  Walks 2 miles per day    Review of Systems   Constitutional: Negative for unexpected weight change.   Eyes: Negative for visual disturbance.   Respiratory: Negative for shortness of breath.    Cardiovascular: Negative for chest pain.   Gastrointestinal: Negative for abdominal pain.   Musculoskeletal: Negative for myalgias.   Skin: Negative for wound.   Allergic/Immunologic:        Hives   Neurological: Negative for  "headaches.   Hematological: Does not bruise/bleed easily.   Psychiatric/Behavioral: Negative for sleep disturbance.       Objective:     /80   Pulse 74   Ht 6' 6" (1.981 m)   Wt 126.2 kg (278 lb 3.5 oz)   BMI 32.15 kg/m²      Physical Exam   Neck: No thyromegaly present.   Cardiovascular: Normal rate.    Pulmonary/Chest: Effort normal.   Abdominal: Soft.   Musculoskeletal: He exhibits no edema.   Skin:   Scattered hives present on chest, back and hands   Vitals reviewed.      Assessment:     1. Hives    2. Post-surgical hypothyroidism    3. Obesity (BMI 30.0-34.9)    4. Malignant neoplasm of thyroid gland        Plan:   1. Mr. Neal would like to switch H1 antihistamine to claritin from zyrtec to see whether he gets any relief from different allergy medication. If he continues to have hives he may consider switching to tirosint to see if that improves his symptoms. Etiology of hives is unclear and may not be related to thyroid hormone replacement. Had previously seen allergy in 2015.  2. Have asked him to resume levothyroxine 150mcg daily without interrruption and recheck TSH in 6 weeks. Goal low normal TSH 0.5-1.0.  3. Periodic a1c monitoring  4. Management as above. Thyroglobulin level from 8/2017 undetectable and thyroid ultrasound from 8/2017 did not demonstrate any disease.    Follow-up in about 6 months (around 9/1/2018).    Discussed with Dr. Ciarra Jha MD  "

## 2018-03-01 NOTE — PROGRESS NOTES
I have reviewed and concur with the fellows's history, physical, assessment, and plan.  I have personally interviewed the patient and all questions were answered.

## 2018-03-06 ENCOUNTER — PATIENT MESSAGE (OUTPATIENT)
Dept: ENDOCRINOLOGY | Facility: CLINIC | Age: 58
End: 2018-03-06

## 2018-03-06 DIAGNOSIS — E89.0 POST-SURGICAL HYPOTHYROIDISM: Primary | ICD-10-CM

## 2018-03-07 RX ORDER — LEVOTHYROXINE SODIUM 13 UG/1
150 CAPSULE ORAL DAILY
Qty: 30 CAPSULE | Refills: 11 | Status: SHIPPED | OUTPATIENT
Start: 2018-03-07 | End: 2018-10-24

## 2018-03-21 ENCOUNTER — PATIENT MESSAGE (OUTPATIENT)
Dept: ENDOCRINOLOGY | Facility: CLINIC | Age: 58
End: 2018-03-21

## 2018-04-12 ENCOUNTER — LAB VISIT (OUTPATIENT)
Dept: LAB | Facility: HOSPITAL | Age: 58
End: 2018-04-12
Attending: FAMILY MEDICINE
Payer: COMMERCIAL

## 2018-04-12 DIAGNOSIS — E89.0 POST-SURGICAL HYPOTHYROIDISM: ICD-10-CM

## 2018-04-12 DIAGNOSIS — C73 MALIGNANT NEOPLASM OF THYROID GLAND: ICD-10-CM

## 2018-04-12 LAB
T4 FREE SERPL-MCNC: 1.15 NG/DL
TSH SERPL DL<=0.005 MIU/L-ACNC: 0.27 UIU/ML

## 2018-04-12 PROCEDURE — 36415 COLL VENOUS BLD VENIPUNCTURE: CPT | Mod: PO

## 2018-04-12 PROCEDURE — 84443 ASSAY THYROID STIM HORMONE: CPT | Mod: PO

## 2018-04-12 PROCEDURE — 84439 ASSAY OF FREE THYROXINE: CPT

## 2018-04-17 ENCOUNTER — OFFICE VISIT (OUTPATIENT)
Dept: ALLERGY | Facility: CLINIC | Age: 58
End: 2018-04-17
Payer: COMMERCIAL

## 2018-04-17 VITALS
HEIGHT: 78 IN | DIASTOLIC BLOOD PRESSURE: 80 MMHG | OXYGEN SATURATION: 97 % | SYSTOLIC BLOOD PRESSURE: 130 MMHG | TEMPERATURE: 98 F | BODY MASS INDEX: 32.4 KG/M2 | HEART RATE: 74 BPM | WEIGHT: 280 LBS

## 2018-04-17 DIAGNOSIS — E07.9 THYROID DISEASE: ICD-10-CM

## 2018-04-17 DIAGNOSIS — L50.9 URTICARIA: Primary | ICD-10-CM

## 2018-04-17 PROCEDURE — 99999 PR PBB SHADOW E&M-EST. PATIENT-LVL III: CPT | Mod: PBBFAC,,, | Performed by: STUDENT IN AN ORGANIZED HEALTH CARE EDUCATION/TRAINING PROGRAM

## 2018-04-17 PROCEDURE — 99204 OFFICE O/P NEW MOD 45 MIN: CPT | Mod: S$GLB,,, | Performed by: STUDENT IN AN ORGANIZED HEALTH CARE EDUCATION/TRAINING PROGRAM

## 2018-04-17 NOTE — PROGRESS NOTES
Allergy Clinic Note  [facility]    Subjective:      Patient ID: Zaheer Neal is a 57 y.o. male.    Chief Complaint: Urticaria and Itching      Referring Provider: Self, Aaareferral    History of Present Illness: 57-year-old male history of thyroid disease presents for evaluation of near daily hives for 2 months.  He has multiple photos of classic urticarial welts and also an isolated flat top plaque on his hand today.    He reports that the hives started in February 2018.  At that point they were diffuse.  He received a total of 3 courses of systemic steroids as well as Vistaril.  The hives tended to be present upon awakening and then improve throughout the day.  He was using vitamin C, aloe, epsom salts and apple cider vinegar.  He sought alternative treatment which included testing for ALLERGIES with a band around his arm and treatment with electrical stimulation to his spine.  Overall the hives have been improving over time, and the itching is manageable on no western medicines.    There has been no associated angioedema or other symptoms.  There have been no prolonged or painful lesions, and no single lesion has lasted more than 24 hours    His history is significant for thyroid disease status post total thyroidectomy.  About a month ago he changed from Synthroid to Tirosint brand of levothyroxine.  He also reports a recent dose increase from 137-150mcg.  Most recent thyroid tests were 4/12/2018 showing a low TSH and a normal T4.    He reports that hives are nearly resolved at this point.  Today he has an isolated welt on one hand.    Additional History:   Past medical history is significant for  thyroid cancer and elevated BMI.  No Hx of ENT surgery. Family  history is significant for mother and sister with breast cancer. Client   reports that he has never smoked. He has never used smokeless tobacco.  he is , works for [employer], and [place] in [place]    Patient Active Problem List   Diagnosis    Peptic ulcer    Chronic urticaria    Malignant neoplasm of thyroid gland    Post-surgical hypothyroidism    Personal history of allergy to shellfish    Obesity (BMI 30.0-34.9)    Screening for colon cancer     Current Outpatient Prescriptions on File Prior to Visit   Medication Sig Dispense Refill    TIROSINT 150 mcg Cap Take 150 mcg by mouth once daily. Brand name medically necessary 30 capsule 11    ranitidine (ZANTAC) 150 MG tablet Take 1 tablet (150 mg total) by mouth 2 (two) times daily. (Patient taking differently: Take 150 mg by mouth once daily. ) 60 tablet 6     No current facility-administered medications on file prior to visit.          Review of Systems   Constitutional: Negative for chills, fever and malaise/fatigue.   HENT: Negative for ear discharge.    Eyes: Negative for pain and discharge.   Respiratory: Negative for hemoptysis, shortness of breath, wheezing and stridor.    Cardiovascular: Negative for chest pain and palpitations.   Gastrointestinal: Negative for abdominal pain, blood in stool, nausea and vomiting.   Genitourinary: Negative for dysuria, flank pain and hematuria.   Musculoskeletal: Negative for joint pain and myalgias.   Skin: Positive for itching and rash.   Neurological: Negative for seizures and loss of consciousness.       Objective:   /80 (BP Location: Right arm, Patient Position: Sitting)   Pulse 74   Temp 97.7 °F (36.5 °C)   Ht 6' 6" (1.981 m)   Wt 127 kg (279 lb 15.8 oz)   SpO2 97%   BMI 32.36 kg/m²       Physical Exam   Constitutional: He is oriented to person, place, and time and well-developed, well-nourished, and in no distress.   HENT:   Head: Normocephalic and atraumatic.   Nose: Nose normal.   Mouth/Throat: No oropharyngeal exudate.   Eyes: Conjunctivae are normal. No scleral icterus.   Neck: Neck supple.   Cardiovascular: Normal rate, regular rhythm and intact distal pulses.    Pulmonary/Chest: " Effort normal. No stridor. No respiratory distress.   Abdominal: Soft. He exhibits no distension and no mass. There is no tenderness.   Musculoskeletal: He exhibits no edema or deformity.   Lymphadenopathy:     He has no cervical adenopathy.   Neurological: He is alert and oriented to person, place, and time.   Skin: Rash noted. No erythema.   Isolated, edmatous, erythematous flat top nodule in the web space between the right thumb and forefinger   Psychiatric: Affect and judgment normal.       Data: Labs 4/12/2018  TSH 0.27  Free T4 1.15    Assessment:     1. Urticaria    2. Thyroid disease        Plan:       Medical Decision making:  At this hives appear to be resolving on their own (and have been present for less than 3 months), client and I agreed to defer workup for now and follow symptoms.  If hives are still present 4 weeks from now, we'll initiate workup.  Based on his initial history and physical and available labs, most likely etiology is thyroid disease, specifically the change in dose and dosage form.    Zaheer was seen today for urticaria and itching.    Diagnoses and all orders for this visit:    Urticaria, subacute - resolving  Follow  RTC if hives still present in 4 weeks: Appointment made  Declined antihistamines    Thyroid disease  Client appears slightly hyperthyroid based on TSH.  Free T4 is normal.   Defer to endocrinology        Patient Instructions   Continue present creams.  Return 4 weeks if hives still present  OK to cancel if well      Follow-up in about 4 weeks (around 5/15/2018).    Dede Chi MD

## 2018-04-20 ENCOUNTER — PATIENT MESSAGE (OUTPATIENT)
Dept: ALLERGY | Facility: CLINIC | Age: 58
End: 2018-04-20

## 2018-04-20 ENCOUNTER — TELEPHONE (OUTPATIENT)
Dept: ALLERGY | Facility: CLINIC | Age: 58
End: 2018-04-20

## 2018-04-20 NOTE — TELEPHONE ENCOUNTER
Spoke to patient. See other telephone encounter. He asked me to send message to Dr. Chi. Waiting on response

## 2018-04-20 NOTE — TELEPHONE ENCOUNTER
Seen Dr. Chi on Tuesday and now has hives on arms and neck.  Patient took Zyrtec 2 days ago and Claritin last night.  He hasn't taken any anti histamines today yet.  Pt also sent pictures on VEEDIMS so Dr. Chi can see.  Wanted appointment today. Informed patient that Dr. Chi is working at the [place] [facility] today. Patient stated that it too far. Asked me to send message to Dr. Chi to see what can be done and ask her to look at his pictures.    Please advise.

## 2018-04-20 NOTE — TELEPHONE ENCOUNTER
----- Message from Felicia Moise sent at 4/20/2018  8:08 AM CDT -----  Contact: self [phone]  Patient requesting a call back to discuss getting an appointment for today for hives all over . Please advise , Thanks

## 2018-04-23 DIAGNOSIS — L50.9 HIVES: Primary | ICD-10-CM

## 2018-04-23 RX ORDER — CETIRIZINE HYDROCHLORIDE 10 MG/1
20 TABLET ORAL 2 TIMES DAILY
Qty: 60 TABLET | Refills: 3 | Status: SHIPPED | OUTPATIENT
Start: 2018-04-23 | End: 2018-10-24

## 2018-04-23 NOTE — TELEPHONE ENCOUNTER
Relayed to patient per Dr. Arti harmon Zyrtec 1-2 tab BID. Patient verbalized understanding and had no other questions.

## 2018-04-26 ENCOUNTER — TELEPHONE (OUTPATIENT)
Dept: ALLERGY | Facility: CLINIC | Age: 58
End: 2018-04-26

## 2018-04-26 NOTE — TELEPHONE ENCOUNTER
Spoke with pharmacy on clarification----- Message from Erica Rivas sent at 4/26/2018  8:52 AM CDT -----  Contact: [place]/[facility]/[phone]  Pharmacy is calling to get clarification on medication directions. They just need to know what set of directions are right. Please advise.      Thanks

## 2018-05-01 ENCOUNTER — OFFICE VISIT (OUTPATIENT)
Dept: ENDOCRINOLOGY | Facility: CLINIC | Age: 58
End: 2018-05-01
Payer: COMMERCIAL

## 2018-05-01 VITALS
BODY MASS INDEX: 32.65 KG/M2 | HEIGHT: 78 IN | DIASTOLIC BLOOD PRESSURE: 70 MMHG | RESPIRATION RATE: 17 BRPM | HEART RATE: 69 BPM | SYSTOLIC BLOOD PRESSURE: 110 MMHG | WEIGHT: 282.19 LBS

## 2018-05-01 DIAGNOSIS — L50.8 CHRONIC URTICARIA: ICD-10-CM

## 2018-05-01 DIAGNOSIS — E89.0 POST-SURGICAL HYPOTHYROIDISM: Primary | ICD-10-CM

## 2018-05-01 DIAGNOSIS — C73 MALIGNANT NEOPLASM OF THYROID GLAND: ICD-10-CM

## 2018-05-01 PROCEDURE — 99999 PR PBB SHADOW E&M-EST. PATIENT-LVL III: CPT | Mod: PBBFAC,,, | Performed by: INTERNAL MEDICINE

## 2018-05-01 PROCEDURE — 3008F BODY MASS INDEX DOCD: CPT | Mod: CPTII,S$GLB,, | Performed by: INTERNAL MEDICINE

## 2018-05-01 PROCEDURE — 99213 OFFICE O/P EST LOW 20 MIN: CPT | Mod: S$GLB,,, | Performed by: INTERNAL MEDICINE

## 2018-05-01 NOTE — PROGRESS NOTES
"Subjective:      Patient ID: Zaheer Neal is a 57 y.o. male.    Chief Complaint: thyroid cancer, hives     is presenting for thyroid cancer.    Last seen in March 2018.    Has had difficulty with hives since college.  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 Has been taking half dose some days in February to see if this would improve his hives       Denies polyuria, polydipsia, nocturia,   No palpitations  No night sweats           8/2017 u/s    Status post thyroidectomy.  There is no sonographic evidence of malignancy in this patient with an undetectable thyroglobulin level.     Works for [employer] and [employer]  Walks 2 miles per day    Review of Systems   Constitutional: Negative for unexpected weight change.   Eyes: Negative for visual disturbance.   Respiratory: Negative for shortness of breath.    Cardiovascular: Negative for chest pain.   Gastrointestinal: Negative for abdominal pain.   Musculoskeletal: Negative for myalgias.   Skin: Positive for rash. Negative for wound.   Neurological: Negative for headaches.   Hematological: Does not bruise/bleed easily.   Psychiatric/Behavioral: Negative for sleep disturbance.       Objective:     /70   Pulse 69   Resp 17   Ht 6' 6" (1.981 m)   Wt 128 " kg (282 lb 3 oz)   BMI 32.61 kg/m²      Physical Exam   Neck: No thyromegaly present.   Cardiovascular: Normal rate.    Pulmonary/Chest: Effort normal.   Abdominal: Soft.   Musculoskeletal: He exhibits no edema.   Vitals reviewed.      Assessment:     1. Post-surgical hypothyroidism    2. Malignant neoplasm of thyroid gland    3. Chronic urticaria        Plan:   1. Will finish out tirosint Rx of 150mcg daily and then transition to synthroid 150mcg as thyroid hormone replacement is likely not contributing to hives. Goal low normal TSH 0.5-1.0. Recheck tsh in August.  2. Management as above. Thyroglobulin level from 8/2017 undetectable and thyroid ultrasound from 8/2017 did not demonstrate any disease.   3. On high dose zyrtec 40mg daily per allergy and has decreased hives. Do not believe thyroid hormone replacement is contributing to hives. Even having switched to tirosint did not prevent hives.    To follow-up with Dr. Bryant in 6 months  Discussed with Dr. Ragini Jha MD

## 2018-05-21 ENCOUNTER — OFFICE VISIT (OUTPATIENT)
Dept: ALLERGY | Facility: CLINIC | Age: 58
End: 2018-05-21
Payer: COMMERCIAL

## 2018-05-21 VITALS
WEIGHT: 283.06 LBS | DIASTOLIC BLOOD PRESSURE: 80 MMHG | BODY MASS INDEX: 32.75 KG/M2 | HEIGHT: 78 IN | HEART RATE: 68 BPM | OXYGEN SATURATION: 96 % | SYSTOLIC BLOOD PRESSURE: 112 MMHG

## 2018-05-21 DIAGNOSIS — L50.9 URTICARIA: Primary | ICD-10-CM

## 2018-05-21 DIAGNOSIS — E07.9 THYROID DISEASE: ICD-10-CM

## 2018-05-21 PROCEDURE — 3008F BODY MASS INDEX DOCD: CPT | Mod: CPTII,S$GLB,, | Performed by: STUDENT IN AN ORGANIZED HEALTH CARE EDUCATION/TRAINING PROGRAM

## 2018-05-21 PROCEDURE — 99999 PR PBB SHADOW E&M-EST. PATIENT-LVL III: CPT | Mod: PBBFAC,,, | Performed by: STUDENT IN AN ORGANIZED HEALTH CARE EDUCATION/TRAINING PROGRAM

## 2018-05-21 PROCEDURE — 99213 OFFICE O/P EST LOW 20 MIN: CPT | Mod: S$GLB,,, | Performed by: STUDENT IN AN ORGANIZED HEALTH CARE EDUCATION/TRAINING PROGRAM

## 2018-05-21 RX ORDER — PREDNISONE 20 MG/1
40 TABLET ORAL DAILY
Qty: 12 TABLET | Refills: 0 | Status: SHIPPED | OUTPATIENT
Start: 2018-05-21 | End: 2018-05-27

## 2018-05-21 RX ORDER — LEVOTHYROXINE SODIUM 150 UG/1
150 TABLET ORAL DAILY
COMMUNITY
End: 2018-10-02 | Stop reason: SDUPTHER

## 2018-05-21 RX ORDER — HYDROXYZINE HYDROCHLORIDE 25 MG/1
TABLET, FILM COATED ORAL
Qty: 240 TABLET | Refills: 3 | Status: SHIPPED | OUTPATIENT
Start: 2018-05-21 | End: 2018-10-24

## 2018-05-21 NOTE — PROGRESS NOTES
Allergy Clinic Note  [facility]    Subjective:      Patient ID: Zaheer Neal is a 58 y.o. male.    Chief Complaint: Follow-up    Allergy problem list  Subacute urticaria and h/o same  Thyroid disease with low TSH    History of Present Illness: 57 yo male with long history of episodic hives -- with current episode beginning in early 2018 -- returns to f/u symptoms and discuss diagnostic evaluation.    He reports his itching is controlled on 10 mg a.m.  +20 mg at hs.  He has mild daily a hive outbreaks. He has had no episodes of swelling or any other associated symptoms.    He would like to try hydroxyzine, which was suggested by his mother, a nurse.  He also requests an emergency supply of prednisone because [place] of [place] 6 [place] starting [place] 29th.    Since last visit, pt was evaluated in endocrinology  For his history of thyroids cancer.  According to Dr Eid's notes, he was diagnosed in 2011 after presented with a thyroid nodule.  At surgery he was found to have extrathyroidal extension. He is s/p surgery and I131.   Also notes to have hives with generic LT4, better with Synthroid brand.  Plan was to transition back from tyrosint to Synthroid with next labs August 2018.    Additional History:   He is a lifetime nonsmoker.  Interval Hx s/f changes to thyroid meds as above, other wise Past medical, family, and social histories are unchanged.        Patient Active Problem List   Diagnosis    Peptic ulcer    Chronic urticaria    Malignant neoplasm of thyroid gland    Post-surgical hypothyroidism    Personal history of allergy to shellfish    Obesity (BMI 30.0-34.9)    Screening for colon cancer     Current Outpatient Prescriptions on File Prior to Visit   Medication Sig Dispense Refill    cetirizine (ZYRTEC) 10 MG tablet Take 2 tablets (20 mg total) by mouth 2 (two) times daily. Take 1 tablet up to twice a day.  Can be used regularly or just when needed. 60 tablet  "3    ranitidine (ZANTAC) 150 MG tablet Take 1 tablet (150 mg total) by mouth 2 (two) times daily. (Patient taking differently: Take 150 mg by mouth once daily. ) 60 tablet 6    TIROSINT 150 mcg Cap Take 150 mcg by mouth once daily. Brand name medically necessary 30 capsule 11     No current facility-administered medications on file prior to visit.          Review of Systems   Constitutional: Negative for chills and fever.   HENT: Negative for ear discharge and nosebleeds.    Eyes: Negative for discharge and redness.   Respiratory: Negative for hemoptysis, sputum production and stridor.    Gastrointestinal: Negative for blood in stool, melena and vomiting.   Genitourinary: Negative for hematuria.   Skin: Positive for rash. Negative for itching.   Neurological: Negative for seizures and loss of consciousness.       Objective:   /80 (BP Location: Right arm, Patient Position: Sitting)   Pulse 68   Ht 6' 6" (1.981 m)   Wt 128.4 kg (283 lb 1.1 oz)   SpO2 96%   BMI 32.71 kg/m²       Physical Exam   Constitutional: He is oriented to person, place, and time and well-developed, well-nourished, and in no distress.   HENT:   Head: Normocephalic and atraumatic.   Nose: Nose normal.   Eyes: Conjunctivae are normal. No scleral icterus.   Neck: Neck supple.   Cardiovascular: Normal rate, regular rhythm and intact distal pulses.    Pulmonary/Chest: Effort normal. No stridor. No respiratory distress.   Abdominal: He exhibits no distension.   Musculoskeletal: He exhibits no edema or deformity.   Neurological: He is alert and oriented to person, place, and time.   Skin: Rash noted. No erythema.   Right antecubital fossa:  Single dime-sized we will  Left upper arm irregular coalescing wheals approximately 5 cm in diameter.   Psychiatric: Affect and judgment normal.       Data:   thyroid labs 4/12/2018  TSH .273 (L)  Free T4 1.15    Assessment:     1. Urticaria    2. Thyroid disease        Plan:     Zaheer was seen today " for follow-up.    Diagnoses and all orders for this visit:    Urticaria, chronic and episodic - Itching  controlled  Gave option of hydroxyzine seen as nighttime medicine.  See options 1 and 2 under patient instructions.  Also prescribed prednisone 40 mg daily for emergency use well out of country.    Thyroid disease - stable  Defer to endo        Patient Instructions   Option 1:  Morning:  ceterizine 1 tablet  Bedtime: hydroxizine 1-8 tablets   Causes drowsiness              Start with 2 tablets tonight              Increase or decrease by one tablet nightly until you find the best dose for you.    Option 2:  Morning:  cetririzine 1 tablet  Bedtime:  cetirizine 2 tablets    In case of emergency in [place]   Prednisone 2 tablets daily until you return to [place]    Return in 3-6 months      Follow-up in about 6 months (around 11/21/2018).    Dede Chi MD

## 2018-05-21 NOTE — PATIENT INSTRUCTIONS
Option 1:  Morning:  ceterizine 1 tablet  Bedtime: hydroxizine 1-8 tablets   Causes drowsiness              Start with 2 tablets tonight              Increase or decrease by one tablet nightly until you find the best dose for you.    Option 2:  Morning:  cetririzine 1 tablet  Bedtime:  cetirizine 2 tablets    In case of emergency in [place]   Prednisone 2 tablets daily until you return to [place]    Return in 3-6 months

## 2018-08-10 ENCOUNTER — LAB VISIT (OUTPATIENT)
Dept: LAB | Facility: HOSPITAL | Age: 58
End: 2018-08-10
Attending: FAMILY MEDICINE
Payer: COMMERCIAL

## 2018-08-10 DIAGNOSIS — E89.0 POST-SURGICAL HYPOTHYROIDISM: ICD-10-CM

## 2018-08-10 DIAGNOSIS — C73 MALIGNANT NEOPLASM OF THYROID GLAND: ICD-10-CM

## 2018-08-10 LAB
T4 FREE SERPL-MCNC: 1.16 NG/DL
TSH SERPL DL<=0.005 MIU/L-ACNC: 0.38 UIU/ML

## 2018-08-10 PROCEDURE — 84443 ASSAY THYROID STIM HORMONE: CPT | Mod: PO

## 2018-08-10 PROCEDURE — 36415 COLL VENOUS BLD VENIPUNCTURE: CPT | Mod: PO

## 2018-08-10 PROCEDURE — 84439 ASSAY OF FREE THYROXINE: CPT

## 2018-09-30 ENCOUNTER — PATIENT MESSAGE (OUTPATIENT)
Dept: ENDOCRINOLOGY | Facility: CLINIC | Age: 58
End: 2018-09-30

## 2018-10-02 RX ORDER — LEVOTHYROXINE SODIUM 150 UG/1
150 TABLET ORAL DAILY
Qty: 90 TABLET | Refills: 3 | OUTPATIENT
Start: 2018-10-02

## 2018-10-02 RX ORDER — LEVOTHYROXINE SODIUM 150 UG/1
150 TABLET ORAL DAILY
Qty: 90 TABLET | Refills: 3 | Status: SHIPPED | OUTPATIENT
Start: 2018-10-02 | End: 2019-09-10 | Stop reason: SDUPTHER

## 2018-10-24 ENCOUNTER — IMMUNIZATION (OUTPATIENT)
Dept: PHARMACY | Facility: CLINIC | Age: 58
End: 2018-10-24
Payer: COMMERCIAL

## 2018-10-24 ENCOUNTER — OFFICE VISIT (OUTPATIENT)
Dept: INTERNAL MEDICINE | Facility: CLINIC | Age: 58
End: 2018-10-24
Attending: FAMILY MEDICINE
Payer: COMMERCIAL

## 2018-10-24 VITALS
WEIGHT: 285.69 LBS | DIASTOLIC BLOOD PRESSURE: 68 MMHG | SYSTOLIC BLOOD PRESSURE: 116 MMHG | BODY MASS INDEX: 33.06 KG/M2 | OXYGEN SATURATION: 96 % | TEMPERATURE: 99 F | HEIGHT: 78 IN | HEART RATE: 76 BPM

## 2018-10-24 DIAGNOSIS — E89.0 POST-SURGICAL HYPOTHYROIDISM: ICD-10-CM

## 2018-10-24 DIAGNOSIS — Z12.5 PROSTATE CANCER SCREENING: ICD-10-CM

## 2018-10-24 DIAGNOSIS — Z00.00 ANNUAL PHYSICAL EXAM: Primary | ICD-10-CM

## 2018-10-24 DIAGNOSIS — M65.331 TRIGGER MIDDLE FINGER OF RIGHT HAND: ICD-10-CM

## 2018-10-24 PROBLEM — Z12.11 SCREENING FOR COLON CANCER: Status: RESOLVED | Noted: 2018-02-05 | Resolved: 2018-10-24

## 2018-10-24 PROCEDURE — 99396 PREV VISIT EST AGE 40-64: CPT | Mod: S$GLB,,, | Performed by: FAMILY MEDICINE

## 2018-10-24 PROCEDURE — 99999 PR PBB SHADOW E&M-EST. PATIENT-LVL III: CPT | Mod: PBBFAC,,, | Performed by: FAMILY MEDICINE

## 2018-10-24 NOTE — PROGRESS NOTES
Subjective:       Patient ID: Zaheer Neal is a 58 y.o. male.    Chief Complaint: Annual Exam (and prostate check)    Established patient for an annual wellness check/physical exam and also chronic disease management. Specific complaints - see dictation and please see ROS.  P, S, Fm, Soc Hx's; Meds, allergies reviewed and reconciled.  Health maintenance file reviewed and addressed items due.        Review of Systems   Constitutional: Negative for activity change, chills, fatigue, fever and unexpected weight change.   HENT: Negative for congestion, hearing loss, rhinorrhea and trouble swallowing.    Eyes: Negative for discharge, redness and visual disturbance.   Respiratory: Negative for cough, chest tightness, shortness of breath and wheezing.    Cardiovascular: Negative for chest pain, palpitations and leg swelling.   Gastrointestinal: Positive for constipation. Negative for abdominal pain, blood in stool, diarrhea and vomiting.   Endocrine: Negative for polydipsia and polyuria.   Genitourinary: Negative for difficulty urinating, hematuria and urgency.   Musculoskeletal: Positive for arthralgias. Negative for back pain, gait problem, joint swelling, myalgias and neck pain.   Skin: Negative for color change and rash.   Neurological: Negative for tremors, speech difficulty, weakness, numbness and headaches.   Hematological: Negative for adenopathy. Does not bruise/bleed easily.   Psychiatric/Behavioral: Negative for behavioral problems, confusion, dysphoric mood and sleep disturbance. The patient is not nervous/anxious.        Objective:      Physical Exam   Constitutional: He appears well-developed and well-nourished.   HENT:   Head: Normocephalic.   Right Ear: Tympanic membrane, external ear and ear canal normal.   Left Ear: Tympanic membrane, external ear and ear canal normal.   Mouth/Throat: Oropharynx is clear and moist.   Eyes: Pupils are equal, round, and reactive to light.   Neck: Normal range of  motion. Neck supple. Carotid bruit is not present. No thyromegaly present.   Cardiovascular: Normal rate, regular rhythm, normal heart sounds and intact distal pulses. Exam reveals no gallop and no friction rub.   No murmur heard.  Pulmonary/Chest: Effort normal and breath sounds normal.   Abdominal: Soft. He exhibits no distension and no mass. There is no tenderness. There is no rebound and no guarding.   Musculoskeletal: Normal range of motion. He exhibits no edema or tenderness.   Lymphadenopathy:     He has no cervical adenopathy.   Neurological: He is alert. He has normal strength. He displays normal reflexes. No cranial nerve deficit or sensory deficit. Coordination and gait normal.   Skin: Skin is warm and dry.   Psychiatric: He has a normal mood and affect. His behavior is normal. Judgment and thought content normal.   Nursing note and vitals reviewed.      Assessment:       1. Annual physical exam    2. Post-surgical hypothyroidism    3. Prostate cancer screening    4. Trigger middle finger of right hand        Plan:   Zaheer was seen today for annual exam.    Diagnoses and all orders for this visit:    Annual physical exam  -     Basic metabolic panel; Standing  -     Lipid panel; Standing  -     PSA, Screening; Standing    Post-surgical hypothyroidism  Comments:  con't endocrinology surveillance    Prostate cancer screening  -     PSA, Screening; Standing    Trigger middle finger of right hand  Comments:  mild am sx, observe for progression      See meds, orders, follow up, routing and instructions sections of encounter.  Dictation #1  MRN:[id number]  CSN:[id number]

## 2018-10-26 ENCOUNTER — LAB VISIT (OUTPATIENT)
Dept: LAB | Facility: HOSPITAL | Age: 58
End: 2018-10-26
Attending: FAMILY MEDICINE
Payer: COMMERCIAL

## 2018-10-26 DIAGNOSIS — Z12.5 PROSTATE CANCER SCREENING: ICD-10-CM

## 2018-10-26 DIAGNOSIS — Z00.00 ANNUAL PHYSICAL EXAM: ICD-10-CM

## 2018-10-26 LAB
ANION GAP SERPL CALC-SCNC: 6 MMOL/L
BUN SERPL-MCNC: 14 MG/DL
CALCIUM SERPL-MCNC: 9 MG/DL
CHLORIDE SERPL-SCNC: 102 MMOL/L
CHOLEST SERPL-MCNC: 148 MG/DL
CHOLEST/HDLC SERPL: 3.7 {RATIO}
CO2 SERPL-SCNC: 32 MMOL/L
COMPLEXED PSA SERPL-MCNC: 7.3 NG/ML
CREAT SERPL-MCNC: 1.12 MG/DL
EST. GFR  (AFRICAN AMERICAN): >60 ML/MIN/1.73 M^2
EST. GFR  (NON AFRICAN AMERICAN): >60 ML/MIN/1.73 M^2
GLUCOSE SERPL-MCNC: 107 MG/DL
HDLC SERPL-MCNC: 40 MG/DL
HDLC SERPL: 27 %
LDLC SERPL CALC-MCNC: 92.4 MG/DL
NONHDLC SERPL-MCNC: 108 MG/DL
POTASSIUM SERPL-SCNC: 4.4 MMOL/L
SODIUM SERPL-SCNC: 140 MMOL/L
TRIGL SERPL-MCNC: 78 MG/DL

## 2018-10-26 PROCEDURE — 36415 COLL VENOUS BLD VENIPUNCTURE: CPT | Mod: PO

## 2018-10-26 PROCEDURE — 80061 LIPID PANEL: CPT

## 2018-10-26 PROCEDURE — 84153 ASSAY OF PSA TOTAL: CPT

## 2018-10-26 PROCEDURE — 80048 BASIC METABOLIC PNL TOTAL CA: CPT | Mod: PO

## 2018-10-28 ENCOUNTER — PATIENT MESSAGE (OUTPATIENT)
Dept: INTERNAL MEDICINE | Facility: CLINIC | Age: 58
End: 2018-10-28

## 2018-10-29 ENCOUNTER — TELEPHONE (OUTPATIENT)
Dept: INTERNAL MEDICINE | Facility: CLINIC | Age: 58
End: 2018-10-29

## 2018-10-30 ENCOUNTER — TELEPHONE (OUTPATIENT)
Dept: INTERNAL MEDICINE | Facility: CLINIC | Age: 58
End: 2018-10-30

## 2018-10-30 DIAGNOSIS — R97.20 ELEVATED PSA: Primary | ICD-10-CM

## 2018-10-30 NOTE — TELEPHONE ENCOUNTER
----- Message from Scarlet Ordoñez sent at 10/30/2018  3:26 PM CDT -----  Contact: [phone]  Patient is returning a phone call.  Who left a message for the patient: Yogesh  Does patient know what this is regarding:    Comments:

## 2018-10-30 NOTE — TELEPHONE ENCOUNTER
Called patient and discussed labs and or test results. Patient expressed understanding and had the opportunity to ask questions. Any questions were answered. See meds, orders, follow up and instructions sections of encounter.    Specific issues include:  PSA - will schedule urine cx and UA and  consult.

## 2018-10-31 NOTE — TELEPHONE ENCOUNTER
Good Morning, Called and spoke with the patient his fasting labs have been scheduled for tomorrow 11/01/18 in [facility] also a Urology appointment on tomorrow at [facility] at 5:20pm

## 2018-11-01 ENCOUNTER — OFFICE VISIT (OUTPATIENT)
Dept: UROLOGY | Facility: CLINIC | Age: 58
End: 2018-11-01
Attending: FAMILY MEDICINE
Payer: COMMERCIAL

## 2018-11-01 ENCOUNTER — LAB VISIT (OUTPATIENT)
Dept: LAB | Facility: HOSPITAL | Age: 58
End: 2018-11-01
Attending: FAMILY MEDICINE
Payer: COMMERCIAL

## 2018-11-01 VITALS
WEIGHT: 285 LBS | DIASTOLIC BLOOD PRESSURE: 83 MMHG | HEIGHT: 78 IN | SYSTOLIC BLOOD PRESSURE: 136 MMHG | HEART RATE: 71 BPM | BODY MASS INDEX: 32.97 KG/M2

## 2018-11-01 DIAGNOSIS — R35.1 NOCTURIA: ICD-10-CM

## 2018-11-01 DIAGNOSIS — R97.20 ELEVATED PSA: Primary | ICD-10-CM

## 2018-11-01 DIAGNOSIS — Z00.00 ANNUAL PHYSICAL EXAM: ICD-10-CM

## 2018-11-01 DIAGNOSIS — Z12.5 PROSTATE CANCER SCREENING: ICD-10-CM

## 2018-11-01 LAB
ANION GAP SERPL CALC-SCNC: 6 MMOL/L
BILIRUB SERPL-MCNC: NORMAL MG/DL
BLOOD URINE, POC: NORMAL
BUN SERPL-MCNC: 19 MG/DL
CALCIUM SERPL-MCNC: 8.8 MG/DL
CHLORIDE SERPL-SCNC: 105 MMOL/L
CHOLEST SERPL-MCNC: 157 MG/DL
CHOLEST/HDLC SERPL: 4.4 {RATIO}
CO2 SERPL-SCNC: 29 MMOL/L
COLOR, POC UA: YELLOW
COMPLEXED PSA SERPL-MCNC: 16.5 NG/ML
CREAT SERPL-MCNC: 1.15 MG/DL
EST. GFR  (AFRICAN AMERICAN): >60 ML/MIN/1.73 M^2
EST. GFR  (NON AFRICAN AMERICAN): >60 ML/MIN/1.73 M^2
GLUCOSE SERPL-MCNC: 102 MG/DL
GLUCOSE UR QL STRIP: NORMAL
HDLC SERPL-MCNC: 36 MG/DL
HDLC SERPL: 22.9 %
KETONES UR QL STRIP: NORMAL
LDLC SERPL CALC-MCNC: 98.8 MG/DL
LEUKOCYTE ESTERASE URINE, POC: NORMAL
NITRITE, POC UA: NORMAL
NONHDLC SERPL-MCNC: 121 MG/DL
PH, POC UA: 7
POTASSIUM SERPL-SCNC: 4.4 MMOL/L
PROTEIN, POC: NORMAL
SODIUM SERPL-SCNC: 140 MMOL/L
SPECIFIC GRAVITY, POC UA: 1.01
TRIGL SERPL-MCNC: 111 MG/DL
UROBILINOGEN, POC UA: NORMAL

## 2018-11-01 PROCEDURE — 84153 ASSAY OF PSA TOTAL: CPT

## 2018-11-01 PROCEDURE — 87086 URINE CULTURE/COLONY COUNT: CPT

## 2018-11-01 PROCEDURE — 99203 OFFICE O/P NEW LOW 30 MIN: CPT | Mod: 25,S$GLB,, | Performed by: NURSE PRACTITIONER

## 2018-11-01 PROCEDURE — 36415 COLL VENOUS BLD VENIPUNCTURE: CPT | Mod: PO

## 2018-11-01 PROCEDURE — 81001 URINALYSIS AUTO W/SCOPE: CPT | Mod: S$GLB,,, | Performed by: NURSE PRACTITIONER

## 2018-11-01 PROCEDURE — 80048 BASIC METABOLIC PNL TOTAL CA: CPT | Mod: PO

## 2018-11-01 PROCEDURE — 3008F BODY MASS INDEX DOCD: CPT | Mod: CPTII,S$GLB,, | Performed by: NURSE PRACTITIONER

## 2018-11-01 PROCEDURE — 80061 LIPID PANEL: CPT

## 2018-11-01 PROCEDURE — 99999 PR PBB SHADOW E&M-EST. PATIENT-LVL IV: CPT | Mod: PBBFAC,,, | Performed by: NURSE PRACTITIONER

## 2018-11-01 RX ORDER — VIT C/E/ZN/COPPR/LUTEIN/ZEAXAN 250MG-90MG
1000 CAPSULE ORAL DAILY
COMMUNITY
End: 2023-03-28

## 2018-11-01 RX ORDER — ASCORBIC ACID 500 MG
500 TABLET ORAL DAILY
COMMUNITY
End: 2023-03-28

## 2018-11-01 RX ORDER — MULTIVITAMIN
1 TABLET ORAL DAILY
COMMUNITY
End: 2023-05-15

## 2018-11-01 RX ORDER — AMOXICILLIN 500 MG
CAPSULE ORAL DAILY
COMMUNITY
End: 2023-03-28

## 2018-11-01 RX ORDER — CALCIUM CARBONATE 600 MG
600 TABLET ORAL ONCE
COMMUNITY
End: 2023-03-28

## 2018-11-01 NOTE — PATIENT INSTRUCTIONS
Prostate Biopsy    Cancer occurs when abnormal cells form a tumor. A tumor is a lump of cells that grow uncontrolled. Initial tests that may indicate cancer of the prostate include a digital rectal exam, a PSA (prostate specific antigen blood test), ultrasound, and others. A core needle biopsy will be done if your healthcare provider thinks you have prostate cancer. A thin needle is used to remove small samples of prostate tissue. Usually multiple biopsies are taken. These samples are checked for cancer.  Taking tissue samples  A biopsy takes about 15 to 20 minutes. You may be given an enema or suppository before the biopsy to clear the bowels. Antibiotics are given at least an hour before the biopsy. During the procedure:  · You will be given antibiotics to prevent infection.  · You may be given a sedative, local pain killer, or pain medicine.  · A small, ultrasound  probe is put into the rectum as you lie on your side. A picture of your prostate can then be seen on a monitor. This is called a transrectal ultrasound (TRUS).  · Your healthcare provider will use the TRUS picture as a guide. He or she will use a thin needle to remove tiny tissue samples from some sites in the prostate.  · These tissue samples are sent to the pathology department. They are looked at under a microscope so a diagnosis can be made.   Risks and complications of core needle biopsy  · Infection  · Blood in urine, stool, or semen  · Pain  · The biopsy misses the tumor   Home care  You may have had the biopsy done through your rectum, your urethra, or through the skin between your scrotum and rectum. Your healthcare provider will tell you what to do after the biopsy. These instructions are based on your health condition, the type of biopsy, and your providers practices.  · Your provider may give you pain medicine such as acetaminophen or ibuprofen for discomfort or pain. Follow your providers instructions for taking these medicines. Dont  take aspirin or ibuprofen after the procedure. If you have a chronic liver or kidney disease, talk with your provider before taking these medicines. Also talk with your provider if youve had a stomach ulcer or gastrointestinal bleeding. Let your provider know all the medicines you currently take.  · You may need to take antibiotics for 1 to 2 days. This will help prevent an infection. Signs of an infection include chills, pain, or fever.  · You may be told to drink 8 ounces of water every 30 minutes for 2 hours. This will help ease any discomfort. You can also take a warm bath or put a warm, damp washcloth over your urethra to help ease the pain.  · You may see minor bleeding after the procedure. This is normal and usually needs no treatment. You may see blood in your urine or semen (rust color). You may also have light bleeding from your rectum if you have hemorrhoids.  · Your provider will tell you when you can go back to your normal activities. This includes sex, exercise, and straining physically. Discuss these with your provider.  When to seek medical advice  Call your healthcare provider right away if any of these occur:  · You arent able to urinate, or see that you have less flow of urine  · Chills and fever of 100.4°F (38°C)    · Severe pain  · Signs of an infection that is getting worse. These include worsening pain, pain in your side under the rib cage or in the low back, or foul-smelling urine.  · Blood clots or bright red blood in your stool or urine  · You feel confused or very tired  Date Last Reviewed: 1/1/2017  © 3855-8911 [employer]. [address] [place] [address], [place] [address]. All rights reserved. This information is not intended as a substitute for professional medical care. Always follow your healthcare professional's instructions.

## 2018-11-01 NOTE — LETTER
November 2, 2018      Silvino Zuñiga MD  [address]  [facility] [address]           [facility] - Urology 4th Floor  [address]  [facility] [address]  Phone: [phone]          Patient: Zaheer Neal   MR Number: [id number]   YOB: 1960   Date of Visit: 11/1/2018       Dear Dr. Silvino Zuñiga:    Thank you for referring Zaheer Neal to me for evaluation. Attached you will find relevant portions of my assessment and plan of care.    If you have questions, please do not hesitate to call me. I look forward to following Zaheer Neal along with you.    Sincerely,    Frieda Das NP    Enclosure  CC:  No Recipients    If you would like to receive this communication electronically, please contact [email or web address] or [phone] to request more information on [employer] access.    For providers and/or their staff who would like to refer a patient to [facility], please contact us through our one-stop-shop provider referral line, [facility], at [phone].    If you feel you have received this communication in error or would no longer like to receive these types of communications, please e-mail [email or web address]

## 2018-11-02 LAB — BACTERIA UR CULT: NO GROWTH

## 2018-11-02 NOTE — PROGRESS NOTES
Subjective:       Patient ID: Zaheer Neal is a 58 y.o. male.    Chief Complaint: Elevated PSA (r/o prostatitis per pcp. rising psa-asymptomatic. no bike riding ) and family history of prostate cancer (3 cousins on mothers side with prostate cancer -dx in age 60's. one nephew (brother's son) diagnosed in his mid 30's )      HPI: Zaheer Neal is a 58 y.o. Black or  male who presents today for evaluation and management of elevated PSA. This is his initial clinic visit.    Pt was referred to Urology by PCP for elevated PSA. Pt had PSA 10/26/18 resulting 7.3 and then repeat PSA 11/1/18 16.5. He denies elevated PSA in the past. He states + family history of prostate cancer, 3 maternal cousins and his brother's son, who was diagnosed in his 30's. He reports nocturia x 3-4, but states he contributes to oral intake. Denies daytime frequency, urgency, incontinence, intermittent stream or hesitancy. FOS good. Denies dysuria, hematuria, flank pain or perineum pain. Denies low back pain. Denies f/c/n/v. Denies history of prostatitis or UTI's.      Review of patient's allergies indicates:   Allergen Reactions    Shellfish containing products Hives       Current Outpatient Medications   Medication Sig Dispense Refill    ascorbic acid, vitamin C, (VITAMIN C) 500 MG tablet Take 500 mg by mouth once daily.      calcium carbonate (OS-BISMARK) 600 mg calcium (1,500 mg) Tab Take 600 mg by mouth once.      cholecalciferol, vitamin D3, (VITAMIN D3) 1,000 unit capsule Take 1,000 Units by mouth once daily.      fish oil-omega-3 fatty acids 300-1,000 mg capsule Take by mouth once daily.      Lactobacillus rhamnosus GG (CULTURELLE) 10 billion cell capsule Take 1 capsule by mouth once daily.      levothyroxine (SYNTHROID) 150 MCG tablet Take 1 tablet (150 mcg total) by mouth once daily. 90 tablet 3    multivitamin (THERAGRAN) per tablet Take 1 tablet by mouth once daily.      pomegranate  xt/pomegranat seed (POMEGRANATE ORAL) Take by mouth.      pumpkin seed extract-soy germ 300 mg Cap Take by mouth.       No current facility-administered medications for this visit.        Past Medical History:   Diagnosis Date    Colonic polyp     Malignant neoplasm of thyroid gland     thyroid    Obesity     Post-surgical hypothyroidism     PUD (peptic ulcer disease)     Thyroid cancer     Thyroid cancer     Urticaria        Past Surgical History:   Procedure Laterality Date    CHONDRECTOMY, KNEE, SEMILUNAR CARTILAGE Right 8/12/2013    [facility]    CHONDROPLASTY-KNEE Right 8/12/2013    [facility] at [facility] OR    COLONOSCOPY N/A 2/5/2018    Procedure: COLONOSCOPY;  Surgeon: ABHISHEK Hitchcock MD;  Location: [facility] (4TH FLR);  Service: Endoscopy;  Laterality: N/A;  confirmed appt.    COLONOSCOPY N/A 2/5/2018    [facility] (4TH FLR)    KNEE SURGERY  8/2013    right knee-includes scope    THYROIDECTOMY         Family History   Problem Relation Age of Onset    Breast cancer Mother     Cancer Mother         breast    Breast cancer Sister     Cancer Sister         breast    Heart disease Father 64        MI    Diabetes Neg Hx     Thyroid cancer Neg Hx        Review of Systems   Constitutional: Negative for chills and fever.   HENT: Negative for congestion.    Eyes: Negative for discharge.   Respiratory: Negative for chest tightness and shortness of breath.    Cardiovascular: Negative for chest pain and palpitations.   Gastrointestinal: Negative for nausea and vomiting.   Genitourinary: Negative for decreased urine volume, difficulty urinating, discharge, dysuria, flank pain, frequency, hematuria, penile pain, penile swelling, scrotal swelling, testicular pain and urgency.        + nocturia  See HPI   Musculoskeletal: Positive for arthralgias. Negative for back pain and gait problem.   Skin: Negative for  rash.   Allergic/Immunologic: Negative for immunocompromised state.   Neurological: Negative for seizures and headaches.   Hematological: Negative for adenopathy.   Psychiatric/Behavioral: Negative for confusion.         All other systems were reviewed and were negative.    Objective:     Vitals:    11/01/18 1714   BP: 136/83   Pulse: 71        Physical Exam   Nursing note and vitals reviewed.  Constitutional: He is oriented to person, place, and time. He appears well-developed and well-nourished. No distress.   HENT:   Head: Normocephalic.   Eyes: Right eye exhibits no discharge. Left eye exhibits no discharge.   Neck: Normal range of motion.   Cardiovascular: Normal rate and regular rhythm.    Pulmonary/Chest: Effort normal. No respiratory distress.   Abdominal: Soft. He exhibits no distension.   Genitourinary:       Musculoskeletal: Normal range of motion.   Neurological: He is alert and oriented to person, place, and time.   Skin: Skin is warm and dry.     Psychiatric: He has a normal mood and affect. His behavior is normal. Judgment and thought content normal.         Lab Results   Component Value Date    CREATININE 1.15 11/01/2018     Lab Results   Component Value Date    EGFRNONAA >60.0 11/01/2018     Lab Results   Component Value Date    ESTGFRAFRICA >60.0 11/01/2018     POCT UA: sp grav 1.015, pH 7, negative results    Assessment:       1. Elevated PSA    2. Nocturia        Plan:     Zaheer was seen today for elevated psa and family history of prostate cancer.    Diagnoses and all orders for this visit:    Elevated PSA  -     POCT urinalysis, dipstick or tablet reag  -     PROSTATE SPECIFIC ANTIGEN, DIAGNOSTIC; Future  -     Urine culture    Nocturia  -     Urine culture      -Urine specimen sent for urine culture to r/o UTI  -Discussed elevated PSA. Discussed prostate biopsy. He would like to repeat PSA in 3 weeks and if elevated will proceed with prostate biopsy.  -Will continue to monitor LUTS since not  bothersome to patient  -Limit PM fluids, no caffeine after 3 pm, void before bed, limit salt intake  Avoid Bladder Irritants: Tea, coffee, caffeine, alcohol, artificial sweeteners, citrus, spicy foods, acidic foods,chocolate, tomato-based foods, smoking  -Maintain good water intake  -RTC pending repeat PSA results     I spent 35 minutes with the patient of which more than half was spent in coordinating the patient's care as well as in direct consultation with the patient in regards to our treatment and plan.

## 2018-11-21 ENCOUNTER — LAB VISIT (OUTPATIENT)
Dept: LAB | Facility: HOSPITAL | Age: 58
End: 2018-11-21
Attending: NURSE PRACTITIONER
Payer: COMMERCIAL

## 2018-11-21 DIAGNOSIS — R97.20 ELEVATED PSA: ICD-10-CM

## 2018-11-21 LAB — COMPLEXED PSA SERPL-MCNC: 2 NG/ML

## 2018-11-21 PROCEDURE — 84153 ASSAY OF PSA TOTAL: CPT

## 2018-11-21 PROCEDURE — 36415 COLL VENOUS BLD VENIPUNCTURE: CPT | Mod: PO

## 2018-11-26 ENCOUNTER — PATIENT MESSAGE (OUTPATIENT)
Dept: ALLERGY | Facility: CLINIC | Age: 58
End: 2018-11-26

## 2018-11-26 ENCOUNTER — TELEPHONE (OUTPATIENT)
Dept: UROLOGY | Facility: CLINIC | Age: 58
End: 2018-11-26

## 2018-11-26 ENCOUNTER — TELEPHONE (OUTPATIENT)
Dept: PEDIATRIC UROLOGY | Facility: CLINIC | Age: 58
End: 2018-11-26

## 2018-11-26 DIAGNOSIS — R97.20 ELEVATED PSA: ICD-10-CM

## 2018-11-26 DIAGNOSIS — R97.20 ELEVATED PSA: Primary | ICD-10-CM

## 2018-11-26 RX ORDER — LIDOCAINE HYDROCHLORIDE 20 MG/ML
JELLY TOPICAL ONCE
Status: CANCELLED | OUTPATIENT
Start: 2018-11-26 | End: 2018-11-26

## 2018-11-26 RX ORDER — CIPROFLOXACIN 500 MG/1
500 TABLET ORAL 2 TIMES DAILY
Qty: 4 TABLET | Refills: 0 | Status: SHIPPED | OUTPATIENT
Start: 2018-11-26 | End: 2019-07-26 | Stop reason: ALTCHOICE

## 2018-11-26 RX ORDER — LIDOCAINE HYDROCHLORIDE 10 MG/ML
10 INJECTION INFILTRATION; PERINEURAL ONCE
Status: CANCELLED | OUTPATIENT
Start: 2018-11-26 | End: 2018-11-26

## 2018-11-26 RX ORDER — CIPROFLOXACIN 500 MG/1
500 TABLET ORAL 2 TIMES DAILY
Qty: 4 TABLET | Refills: 0 | OUTPATIENT
Start: 2018-11-26 | End: 2018-11-26 | Stop reason: SDUPTHER

## 2018-11-26 NOTE — TELEPHONE ENCOUNTER
Spoke with patient regarding PSA result 2.0.    Pt has + family history of prostate cancer.  Prior to elevated PSA 10/26/18, his baseline PSA was 0.7.    Discussed monitoring PSA and repeat in 3 months or proceed with prostate biopsy.  He would like to proceed with prostate biopsy.  Orders placed. Estefanía will call patient to schedule for TRUS with biopsy with Dr. Ricks.

## 2018-11-26 NOTE — TELEPHONE ENCOUNTER
Spoke with pt and let him know the cipro antibiotic was mailed. Voiced understanding            ----- Message from Dalia Fallon sent at 11/26/2018  4:35 PM CST -----  Contact: pt#[phone]  Patient Returning Call from [facility]    Who Left Message for Patient:Estefanía  Communication Preference:call  Additional Information:

## 2019-01-15 ENCOUNTER — HOSPITAL ENCOUNTER (OUTPATIENT)
Dept: UROLOGY | Facility: HOSPITAL | Age: 59
Discharge: HOME | End: 2019-01-15
Attending: UROLOGY
Payer: COMMERCIAL

## 2019-01-15 VITALS
SYSTOLIC BLOOD PRESSURE: 149 MMHG | WEIGHT: 273.38 LBS | DIASTOLIC BLOOD PRESSURE: 78 MMHG | RESPIRATION RATE: 16 BRPM | BODY MASS INDEX: 31.63 KG/M2 | TEMPERATURE: 98 F | HEIGHT: 78 IN | HEART RATE: 65 BPM

## 2019-01-15 DIAGNOSIS — R97.20 ELEVATED PSA: ICD-10-CM

## 2019-01-15 PROCEDURE — 76942 PR U/S GUIDANCE FOR NEEDLE GUIDANCE: ICD-10-PCS | Mod: 26,59,, | Performed by: UROLOGY

## 2019-01-15 PROCEDURE — 55700 PR BIOPSY OF PROSTATE,NEEDLE/PUNCH: CPT | Mod: ,,, | Performed by: UROLOGY

## 2019-01-15 PROCEDURE — 76942 ECHO GUIDE FOR BIOPSY: CPT | Mod: 59

## 2019-01-15 PROCEDURE — 88305 TISSUE SPECIMEN TO PATHOLOGY, UROLOGY: ICD-10-PCS | Mod: 26,,, | Performed by: PATHOLOGY

## 2019-01-15 PROCEDURE — 88305 TISSUE EXAM BY PATHOLOGIST: CPT | Performed by: PATHOLOGY

## 2019-01-15 PROCEDURE — 55700 HC BIOPSY OF PROSTATE - NEEDLE OR PUNCH: CPT

## 2019-01-15 PROCEDURE — 55700 PR BIOPSY OF PROSTATE,NEEDLE/PUNCH: ICD-10-PCS | Mod: ,,, | Performed by: UROLOGY

## 2019-01-15 PROCEDURE — 76872 PR US TRANSRECTAL: ICD-10-PCS | Mod: 26,,, | Performed by: UROLOGY

## 2019-01-15 PROCEDURE — 76942 ECHO GUIDE FOR BIOPSY: CPT | Mod: 26,59,, | Performed by: UROLOGY

## 2019-01-15 PROCEDURE — 76872 US TRANSRECTAL: CPT

## 2019-01-15 PROCEDURE — 76872 US TRANSRECTAL: CPT | Mod: 26,,, | Performed by: UROLOGY

## 2019-01-15 PROCEDURE — 88305 TISSUE EXAM BY PATHOLOGIST: CPT | Mod: 26,,, | Performed by: PATHOLOGY

## 2019-01-15 RX ORDER — LIDOCAINE HYDROCHLORIDE 10 MG/ML
10 INJECTION INFILTRATION; PERINEURAL ONCE
Status: COMPLETED | OUTPATIENT
Start: 2019-01-15 | End: 2019-01-15

## 2019-01-15 RX ORDER — LIDOCAINE HYDROCHLORIDE 20 MG/ML
JELLY TOPICAL
Status: COMPLETED | OUTPATIENT
Start: 2019-01-15 | End: 2019-01-15

## 2019-01-15 RX ADMIN — LIDOCAINE HYDROCHLORIDE 10 ML: 10 INJECTION INFILTRATION; PERINEURAL at 08:01

## 2019-01-15 RX ADMIN — LIDOCAINE HYDROCHLORIDE 20 ML: 20 JELLY TOPICAL at 08:01

## 2019-01-15 NOTE — PROCEDURES
Pre-procedure diagnosis: elevated psa  Lab Results   Component Value Date    PSA 16.5 (H) 11/01/2018    PSA 7.3 (H) 10/26/2018    PSA 0.72 12/12/2017    PSA 0.77 12/12/2016    PSA 0.62 12/15/2015    PSA 0.99 11/12/2014    PSADIAG 2.0 11/21/2018       Post-procedure diagnosis: same    Procedure: Transrectal ultrasound guided prostate biopsy    Complications: none    Blood loss: minimal    Surgeon: Zaheer Ricks MD    Anesthesia: 10cc lidocaine jelly, 20cc 1% lidocaine for prostatic block    TRUS size: 51.5cc    Procedure: The risks and benefits of the procedure were explained to the patient and consent was obtained.  The patient laid in the lateral decubitus position.  10cc of lidocaine jelly was used for local analgesia in the rectum.  The ultrasound probe was advanced into the rectum. The prostate was visualized.  There was not a median lobe.  20cc of 1% lidocaine without epi was used for a prostatic nerve block.  12 biopsies were then takes, 2 from the apex, mid and base from the right and left side.    The patient tolerated the procedure well and was discharged home.  We will call him when the results are available for review.  He will finish the course of antibiotics.

## 2019-01-15 NOTE — H&P
Subjective:       Patient ID: Zaheer Neal is a 58 y.o. male.    Chief Complaint: Elevated PSA      HPI: Zaheer Neal is a 58 y.o. Black or  male who presents today for evaluation and management of elevated PSA. This is his initial clinic visit.    Pt was referred to Urology by PCP for elevated PSA. Pt had PSA 10/26/18 resulting 7.3 and then repeat PSA 11/1/18 16.5. He denies elevated PSA in the past. He states + family history of prostate cancer, 3 maternal cousins and his brother's son, who was diagnosed in his 30's. He reports nocturia x 3-4, but states he contributes to oral intake. Denies daytime frequency, urgency, incontinence, intermittent stream or hesitancy. FOS good. Denies dysuria, hematuria, flank pain or perineum pain. Denies low back pain. Denies f/c/n/v. Denies history of prostatitis or UTI's.      Lab Results   Component Value Date    PSA 16.5 (H) 11/01/2018    PSA 7.3 (H) 10/26/2018    PSA 0.72 12/12/2017    PSA 0.77 12/12/2016    PSA 0.62 12/15/2015    PSA 0.99 11/12/2014    PSADIAG 2.0 11/21/2018         Review of patient's allergies indicates:   Allergen Reactions    Shellfish containing products Hives       Current Outpatient Medications   Medication Sig Dispense Refill    ascorbic acid, vitamin C, (VITAMIN C) 500 MG tablet Take 500 mg by mouth once daily.      calcium carbonate (OS-BISMARK) 600 mg calcium (1,500 mg) Tab Take 600 mg by mouth once.      cholecalciferol, vitamin D3, (VITAMIN D3) 1,000 unit capsule Take 1,000 Units by mouth once daily.      ciprofloxacin HCl (CIPRO) 500 MG tablet Take 1 tablet (500 mg total) by mouth 2 (two) times daily. Take 1 pill at night before procedure. Take 1 pill 2 hours before procedure. Take 1 pill in evening after procedure. Take 1 pill in morning after procedure. 4 tablet 0    fish oil-omega-3 fatty acids 300-1,000 mg capsule Take by mouth once daily.      Lactobacillus rhamnosus GG (CULTURELLE) 10 billion cell  capsule Take 1 capsule by mouth once daily.      levothyroxine (SYNTHROID) 150 MCG tablet Take 1 tablet (150 mcg total) by mouth once daily. 90 tablet 3    multivitamin (THERAGRAN) per tablet Take 1 tablet by mouth once daily.      pomegranate xt/pomegranat seed (POMEGRANATE ORAL) Take by mouth.      pumpkin seed extract-soy germ 300 mg Cap Take by mouth.       No current facility-administered medications for this encounter.        Past Medical History:   Diagnosis Date    Colonic polyp     Malignant neoplasm of thyroid gland     thyroid    Obesity     Post-surgical hypothyroidism     PUD (peptic ulcer disease)     Thyroid cancer     Thyroid cancer     Urticaria        Past Surgical History:   Procedure Laterality Date    CHONDRECTOMY, KNEE, SEMILUNAR CARTILAGE Right 8/12/2013    [facility]    CHONDROPLASTY-KNEE Right 8/12/2013    [facility] at [facility]    COLONOSCOPY N/A 2/5/2018    [facility] (4TH FLR)    KNEE SURGERY  8/2013    right knee-includes scope    THYROIDECTOMY         Family History   Problem Relation Age of Onset    Breast cancer Mother     Cancer Mother         breast    Breast cancer Sister     Cancer Sister         breast    Heart disease Father 64        MI    Diabetes Neg Hx     Thyroid cancer Neg Hx        Review of Systems   Constitutional: Negative for chills and fever.   HENT: Negative for congestion.    Eyes: Negative for discharge.   Respiratory: Negative for chest tightness and shortness of breath.    Cardiovascular: Negative for chest pain and palpitations.   Gastrointestinal: Negative for nausea and vomiting.   Genitourinary: Negative for decreased urine volume, difficulty urinating, discharge, dysuria, flank pain, frequency, hematuria, penile pain, penile swelling, scrotal swelling, testicular pain and urgency.        + nocturia  See HPI   Musculoskeletal: Positive for  arthralgias. Negative for back pain and gait problem.   Skin: Negative for rash.   Allergic/Immunologic: Negative for immunocompromised state.   Neurological: Negative for seizures and headaches.   Hematological: Negative for adenopathy.   Psychiatric/Behavioral: Negative for confusion.         All other systems were reviewed and were negative.    Objective:     There were no vitals filed for this visit.     Physical Exam   Nursing note and vitals reviewed.  Constitutional: He is oriented to person, place, and time. He appears well-developed and well-nourished. No distress.   HENT:   Head: Normocephalic.   Eyes: Right eye exhibits no discharge. Left eye exhibits no discharge.   Neck: Normal range of motion.   Cardiovascular: Normal rate and regular rhythm.    Pulmonary/Chest: Effort normal. No respiratory distress.   Abdominal: Soft. He exhibits no distension.   Genitourinary:       Musculoskeletal: Normal range of motion.   Neurological: He is alert and oriented to person, place, and time.   Skin: Skin is warm and dry.     Psychiatric: He has a normal mood and affect. His behavior is normal. Judgment and thought content normal.         Lab Results   Component Value Date    CREATININE 1.15 11/01/2018     Lab Results   Component Value Date    EGFRNONAA >60.0 11/01/2018     Lab Results   Component Value Date    ESTGFRAFRICA >60.0 11/01/2018     POCT UA: sp grav 1.015, pH 7, negative results    Assessment:       1. Elevated PSA        Plan:     Zaheer was seen today for elevated psa and family history of prostate cancer.    Diagnoses and all orders for this visit:    Elevated PSA  I have explained the indication, risks, benefits, and alternatives of the procedure in detail.  The patient voices understanding and all questions have been answered.  The patient agrees to proceed as planned with TRUS biopsy.

## 2019-01-15 NOTE — PATIENT INSTRUCTIONS

## 2019-03-12 ENCOUNTER — CLINICAL SUPPORT (OUTPATIENT)
Dept: OTHER | Facility: CLINIC | Age: 59
End: 2019-03-12
Payer: COMMERCIAL

## 2019-03-12 DIAGNOSIS — Z00.8 ENCOUNTER FOR OTHER GENERAL EXAMINATION: ICD-10-CM

## 2019-03-12 PROCEDURE — 80061 PR  LIPID PANEL: ICD-10-PCS | Mod: QW,S$GLB,, | Performed by: INTERNAL MEDICINE

## 2019-03-12 PROCEDURE — 80061 LIPID PANEL: CPT | Mod: QW,S$GLB,, | Performed by: INTERNAL MEDICINE

## 2019-03-12 PROCEDURE — 99401 PREV MED CNSL INDIV APPRX 15: CPT | Mod: S$GLB,,, | Performed by: INTERNAL MEDICINE

## 2019-03-12 PROCEDURE — 82947 PR  ASSAY QUANTITATIVE,BLOOD GLUCOSE: ICD-10-PCS | Mod: QW,S$GLB,, | Performed by: INTERNAL MEDICINE

## 2019-03-12 PROCEDURE — 99401 PR PREVENT COUNSEL,INDIV,15 MIN: ICD-10-PCS | Mod: S$GLB,,, | Performed by: INTERNAL MEDICINE

## 2019-03-12 PROCEDURE — 82947 ASSAY GLUCOSE BLOOD QUANT: CPT | Mod: QW,S$GLB,, | Performed by: INTERNAL MEDICINE

## 2019-03-13 VITALS — BODY MASS INDEX: 31.59 KG/M2 | HEIGHT: 78 IN

## 2019-03-13 LAB
GLUCOSE SERPL-MCNC: 104 MG/DL (ref 70–110)
HDLC SERPL-MCNC: 35 MG/DL
POC CHOLESTEROL, LDL (DOCK): 123 MG/DL
POC CHOLESTEROL, TOTAL: 200 MG/DL
TRIGL SERPL-MCNC: 214 MG/DL

## 2019-05-23 ENCOUNTER — PATIENT MESSAGE (OUTPATIENT)
Dept: ENDOCRINOLOGY | Facility: CLINIC | Age: 59
End: 2019-05-23

## 2019-05-29 ENCOUNTER — PATIENT OUTREACH (OUTPATIENT)
Dept: ADMINISTRATIVE | Facility: HOSPITAL | Age: 59
End: 2019-05-29

## 2019-05-29 NOTE — PROGRESS NOTES
Health Maintenance Due   Topic Date Due    Pneumococcal Vaccine (Highest Risk) (1 of 3 - PCV13) 05/07/1979     Pre-visit chart check complete.

## 2019-06-12 ENCOUNTER — OFFICE VISIT (OUTPATIENT)
Dept: INTERNAL MEDICINE | Facility: CLINIC | Age: 59
End: 2019-06-12
Attending: FAMILY MEDICINE
Payer: COMMERCIAL

## 2019-06-12 ENCOUNTER — LAB VISIT (OUTPATIENT)
Dept: LAB | Facility: HOSPITAL | Age: 59
End: 2019-06-12
Attending: FAMILY MEDICINE
Payer: COMMERCIAL

## 2019-06-12 VITALS
DIASTOLIC BLOOD PRESSURE: 74 MMHG | HEART RATE: 70 BPM | WEIGHT: 278.69 LBS | OXYGEN SATURATION: 97 % | BODY MASS INDEX: 32.24 KG/M2 | HEIGHT: 78 IN | TEMPERATURE: 98 F | SYSTOLIC BLOOD PRESSURE: 116 MMHG

## 2019-06-12 DIAGNOSIS — E78.5 HYPERLIPIDEMIA, UNSPECIFIED HYPERLIPIDEMIA TYPE: ICD-10-CM

## 2019-06-12 DIAGNOSIS — Z00.00 ANNUAL PHYSICAL EXAM: Primary | ICD-10-CM

## 2019-06-12 DIAGNOSIS — E89.0 POST-SURGICAL HYPOTHYROIDISM: ICD-10-CM

## 2019-06-12 DIAGNOSIS — C73 MALIGNANT NEOPLASM OF THYROID GLAND: ICD-10-CM

## 2019-06-12 DIAGNOSIS — M65.331 TRIGGER MIDDLE FINGER OF RIGHT HAND: ICD-10-CM

## 2019-06-12 DIAGNOSIS — Z00.00 ANNUAL PHYSICAL EXAM: ICD-10-CM

## 2019-06-12 LAB
ANION GAP SERPL CALC-SCNC: 9 MMOL/L (ref 8–16)
BUN SERPL-MCNC: 22 MG/DL (ref 6–20)
CALCIUM SERPL-MCNC: 9.3 MG/DL (ref 8.7–10.5)
CHLORIDE SERPL-SCNC: 103 MMOL/L (ref 95–110)
CHOLEST SERPL-MCNC: 168 MG/DL (ref 120–199)
CHOLEST/HDLC SERPL: 4.1 {RATIO} (ref 2–5)
CO2 SERPL-SCNC: 28 MMOL/L (ref 23–29)
CREAT SERPL-MCNC: 1.1 MG/DL (ref 0.5–1.4)
EST. GFR  (AFRICAN AMERICAN): >60 ML/MIN/1.73 M^2
EST. GFR  (NON AFRICAN AMERICAN): >60 ML/MIN/1.73 M^2
GLUCOSE SERPL-MCNC: 98 MG/DL (ref 70–110)
HDLC SERPL-MCNC: 41 MG/DL (ref 40–75)
HDLC SERPL: 24.4 % (ref 20–50)
LDLC SERPL CALC-MCNC: 100.2 MG/DL (ref 63–159)
NONHDLC SERPL-MCNC: 127 MG/DL
POTASSIUM SERPL-SCNC: 4.2 MMOL/L (ref 3.5–5.1)
SODIUM SERPL-SCNC: 140 MMOL/L (ref 136–145)
T4 FREE SERPL-MCNC: 1.19 NG/DL (ref 0.71–1.51)
TRIGL SERPL-MCNC: 134 MG/DL (ref 30–150)
TSH SERPL DL<=0.005 MIU/L-ACNC: 0.1 UIU/ML (ref 0.4–4)

## 2019-06-12 PROCEDURE — 84443 ASSAY THYROID STIM HORMONE: CPT

## 2019-06-12 PROCEDURE — 80061 LIPID PANEL: CPT

## 2019-06-12 PROCEDURE — 36415 COLL VENOUS BLD VENIPUNCTURE: CPT

## 2019-06-12 PROCEDURE — 80048 BASIC METABOLIC PNL TOTAL CA: CPT

## 2019-06-12 PROCEDURE — 99396 PREV VISIT EST AGE 40-64: CPT | Mod: S$GLB,,, | Performed by: FAMILY MEDICINE

## 2019-06-12 PROCEDURE — 99999 PR PBB SHADOW E&M-EST. PATIENT-LVL III: ICD-10-PCS | Mod: PBBFAC,,, | Performed by: FAMILY MEDICINE

## 2019-06-12 PROCEDURE — 99999 PR PBB SHADOW E&M-EST. PATIENT-LVL III: CPT | Mod: PBBFAC,,, | Performed by: FAMILY MEDICINE

## 2019-06-12 PROCEDURE — 99396 PR PREVENTIVE VISIT,EST,40-64: ICD-10-PCS | Mod: S$GLB,,, | Performed by: FAMILY MEDICINE

## 2019-06-12 PROCEDURE — 84439 ASSAY OF FREE THYROXINE: CPT

## 2019-06-12 NOTE — PROGRESS NOTES
Subjective:       Patient ID: Zaheer Neal is a 59 y.o. male.    Chief Complaint: Annual Exam    Established patient for an annual wellness check/physical exam and also chronic disease management. Specific complaints - see dictation and please see ROS.  P, S, Fm, Soc Hx's; Meds, allergies reviewed and reconciled.  Health maintenance file reviewed and addressed items due.    Established patient follows up for management of chronic medical illnesses with complaints today. Please see dictation and ROS for interval problems, specific complaints and disease management discussion.    P, S, Fm, Soc Hx's; Meds, allergies reviewed and reconciled.  Health maintenance file reviewed and addressed items due.    Review of Systems   Constitutional: Negative for activity change, chills, fatigue, fever and unexpected weight change.   HENT: Negative for congestion, hearing loss, rhinorrhea and trouble swallowing.    Eyes: Negative for discharge, redness and visual disturbance.   Respiratory: Negative for cough, chest tightness, shortness of breath and wheezing.    Cardiovascular: Negative for chest pain, palpitations and leg swelling.   Gastrointestinal: Negative for abdominal pain, blood in stool, constipation, diarrhea and vomiting.   Endocrine: Negative for polydipsia and polyuria.   Genitourinary: Negative for difficulty urinating, hematuria and urgency.   Musculoskeletal: Positive for arthralgias and neck pain. Negative for back pain, gait problem, joint swelling and myalgias.   Skin: Negative for color change and rash.   Neurological: Negative for tremors, speech difficulty, weakness, numbness and headaches.   Hematological: Negative for adenopathy. Does not bruise/bleed easily.   Psychiatric/Behavioral: Negative for behavioral problems, confusion, dysphoric mood and sleep disturbance. The patient is not nervous/anxious.        Objective:      Physical Exam   Constitutional: He appears well-developed and  well-nourished.   HENT:   Head: Normocephalic.   Right Ear: Tympanic membrane, external ear and ear canal normal.   Left Ear: Tympanic membrane, external ear and ear canal normal.   Mouth/Throat: Oropharynx is clear and moist.   Eyes: Pupils are equal, round, and reactive to light.   Neck: Normal range of motion. Neck supple. Carotid bruit is not present. No thyromegaly present.   Cardiovascular: Normal rate, regular rhythm, normal heart sounds and intact distal pulses. Exam reveals no gallop and no friction rub.   No murmur heard.  Pulmonary/Chest: Effort normal and breath sounds normal.   Abdominal: Soft. He exhibits no distension and no mass. There is no tenderness. There is no rebound and no guarding.   Musculoskeletal: He exhibits no edema or tenderness.        Right hand: He exhibits decreased range of motion.        Hands:  Lymphadenopathy:     He has no cervical adenopathy.   Neurological: He is alert. He has normal strength. He displays normal reflexes. No cranial nerve deficit or sensory deficit. Coordination and gait normal.   Skin: Skin is warm and dry.   Psychiatric: He has a normal mood and affect. His behavior is normal. Judgment and thought content normal.   Nursing note and vitals reviewed.      Assessment:       1. Annual physical exam    2. Malignant neoplasm of thyroid gland    3. Post-surgical hypothyroidism    4. Hyperlipidemia, unspecified hyperlipidemia type    5. Trigger middle finger of right hand        Plan:   Zaheer was seen today for annual exam.    Diagnoses and all orders for this visit:    Annual physical exam    Malignant neoplasm of thyroid gland  -     TSH; Standing  -     T4, free; Future    Post-surgical hypothyroidism  -     TSH; Standing  -     T4, free; Future    Hyperlipidemia, unspecified hyperlipidemia type    Trigger middle finger of right hand      See meds, orders, follow up, routing and instructions sections of encounter.  This is a patient who is in for his annual  physical examination.  He has a   triggering digit on the right hand.  We did discuss doing an injection.  I did a   proposed to him the possible side effects.  He would like to reschedule in a   few weeks after he comes back from a cruise.      BELEN  dd: 06/12/2019 10:34:56 (CDT)  td: 06/13/2019 00:48:15 (CDT)  Doc ID   #[id number]  Job ID #[id number]    CC:

## 2019-06-29 ENCOUNTER — TELEPHONE (OUTPATIENT)
Dept: INTERNAL MEDICINE | Facility: CLINIC | Age: 59
End: 2019-06-29

## 2019-07-02 ENCOUNTER — TELEPHONE (OUTPATIENT)
Dept: INTERNAL MEDICINE | Facility: CLINIC | Age: 59
End: 2019-07-02

## 2019-07-05 ENCOUNTER — PATIENT OUTREACH (OUTPATIENT)
Dept: ADMINISTRATIVE | Facility: HOSPITAL | Age: 59
End: 2019-07-05

## 2019-07-05 NOTE — PROGRESS NOTES
Health Maintenance Due   Topic Date Due    Pneumococcal Vaccine (Highest Risk) (1 of 3 - PCV13) 05/07/1979     Shingles vaccine due.    Pre-visit chart check complete.

## 2019-07-15 ENCOUNTER — TELEPHONE (OUTPATIENT)
Dept: INTERNAL MEDICINE | Facility: CLINIC | Age: 59
End: 2019-07-15

## 2019-07-15 NOTE — TELEPHONE ENCOUNTER
----- Message from Moon Huber sent at 7/15/2019  9:56 AM CDT -----  Contact: [phone]  Type: Returning a call    Who left a message? Dr. Zuñiga    When did the practice call?  7-2-2019    Comments:  Please advise, thank you

## 2019-07-17 NOTE — TELEPHONE ENCOUNTER
Patient had been out of the country.  I discussed his THS level within.  He does have a history of thyroid cancer and will be seen the Endocrinology service in September.  No medication changes at this time.

## 2019-07-26 ENCOUNTER — OFFICE VISIT (OUTPATIENT)
Dept: INTERNAL MEDICINE | Facility: CLINIC | Age: 59
End: 2019-07-26
Payer: COMMERCIAL

## 2019-07-26 VITALS
TEMPERATURE: 98 F | HEART RATE: 66 BPM | WEIGHT: 290.13 LBS | HEIGHT: 78 IN | SYSTOLIC BLOOD PRESSURE: 119 MMHG | OXYGEN SATURATION: 98 % | DIASTOLIC BLOOD PRESSURE: 72 MMHG | BODY MASS INDEX: 33.57 KG/M2

## 2019-07-26 DIAGNOSIS — R22.0 LIP SWELLING: Primary | ICD-10-CM

## 2019-07-26 PROCEDURE — 99213 OFFICE O/P EST LOW 20 MIN: CPT | Mod: S$GLB,,, | Performed by: PHYSICIAN ASSISTANT

## 2019-07-26 PROCEDURE — 99999 PR PBB SHADOW E&M-EST. PATIENT-LVL III: CPT | Mod: PBBFAC,,, | Performed by: PHYSICIAN ASSISTANT

## 2019-07-26 PROCEDURE — 99213 PR OFFICE/OUTPT VISIT, EST, LEVL III, 20-29 MIN: ICD-10-PCS | Mod: S$GLB,,, | Performed by: PHYSICIAN ASSISTANT

## 2019-07-26 PROCEDURE — 3008F BODY MASS INDEX DOCD: CPT | Mod: CPTII,S$GLB,, | Performed by: PHYSICIAN ASSISTANT

## 2019-07-26 PROCEDURE — 99999 PR PBB SHADOW E&M-EST. PATIENT-LVL III: ICD-10-PCS | Mod: PBBFAC,,, | Performed by: PHYSICIAN ASSISTANT

## 2019-07-26 PROCEDURE — 3008F PR BODY MASS INDEX (BMI) DOCUMENTED: ICD-10-PCS | Mod: CPTII,S$GLB,, | Performed by: PHYSICIAN ASSISTANT

## 2019-07-26 RX ORDER — PREDNISONE 10 MG/1
TABLET ORAL
Qty: 9 TABLET | Refills: 0 | Status: SHIPPED | OUTPATIENT
Start: 2019-07-26 | End: 2019-09-09

## 2019-07-26 NOTE — PROGRESS NOTES
Subjective:       Patient ID: Zaheer Neal is a 59 y.o. male.    Chief Complaint: Oral Swelling    Patient presents with a 2 day history of lower lip swelling.  He states he was working outside and started to feel a sensation in the lower lip.  He said by that evening the bottom lip was significantly swollen.  He says it is tender if he squeezed the area but there is no evidence of any kind of bug sting or drainage. He denies any surrounding erythema.  He does have a shellfish allergy but denies any cross contamination was shellfish or any new foods that he tried on that day.  He denies any throat swelling, hives, or difficulty breathing.  He has taken a dose of Benadryl and applied ice to the area with no relief.    Review of Systems   Constitutional: Negative for activity change, appetite change, fatigue and unexpected weight change.   HENT: Positive for facial swelling. Negative for congestion, dental problem, drooling, ear discharge, ear pain, hearing loss, mouth sores, nosebleeds, postnasal drip, rhinorrhea, sinus pressure, sinus pain, sneezing, sore throat, tinnitus, trouble swallowing and voice change.    Eyes: Negative for pain and visual disturbance.   Respiratory: Negative for chest tightness, shortness of breath and wheezing.    Cardiovascular: Negative for chest pain, palpitations and leg swelling.   Musculoskeletal: Negative for neck pain.   Skin: Negative for color change and wound.   Neurological: Negative for dizziness, syncope, light-headedness and headaches.       Objective:      Physical Exam   Constitutional: He is oriented to person, place, and time. He appears well-developed and well-nourished. No distress.   HENT:   Head: Normocephalic and atraumatic.   Mouth/Throat: Uvula is midline, oropharynx is clear and moist and mucous membranes are normal. No oral lesions. Normal dentition. No lacerations.       Eyes: Pupils are equal, round, and reactive to light. Conjunctivae are normal.    Neck: Normal range of motion. Neck supple. No JVD present. No thyromegaly present.   Cardiovascular: Normal rate and regular rhythm. Exam reveals no gallop and no friction rub.   No murmur heard.  Pulmonary/Chest: Effort normal. No respiratory distress. He has no wheezes. He has no rales. He exhibits no tenderness.   Neurological: He is alert and oriented to person, place, and time.   Skin: He is not diaphoretic.       Assessment:       1. Lip swelling        Plan:       Zaheer was seen today for oral swelling.    Diagnoses and all orders for this visit:    Lip swelling  -     predniSONE (DELTASONE) 10 MG tablet; Take 2 pills a day for 3 days then 1 pill a day for 3 days     Patient is to continue Benadryl.  If swelling does not resolve over the next 24-48 hrs. he is to give us a call.  If any swelling worsens or progresses to his throat he is to go to the nearest emergency room.  He expressed understanding.

## 2019-08-15 ENCOUNTER — OFFICE VISIT (OUTPATIENT)
Dept: SPORTS MEDICINE | Facility: CLINIC | Age: 59
End: 2019-08-15
Payer: COMMERCIAL

## 2019-08-15 ENCOUNTER — HOSPITAL ENCOUNTER (OUTPATIENT)
Dept: RADIOLOGY | Facility: HOSPITAL | Age: 59
Discharge: HOME | End: 2019-08-15
Attending: ORTHOPAEDIC SURGERY
Payer: COMMERCIAL

## 2019-08-15 VITALS — BODY MASS INDEX: 33.55 KG/M2 | HEIGHT: 78 IN | WEIGHT: 290 LBS

## 2019-08-15 DIAGNOSIS — M25.561 RIGHT KNEE PAIN, UNSPECIFIED CHRONICITY: Primary | ICD-10-CM

## 2019-08-15 DIAGNOSIS — M25.561 RIGHT KNEE PAIN, UNSPECIFIED CHRONICITY: ICD-10-CM

## 2019-08-15 PROCEDURE — 99999 PR PBB SHADOW E&M-EST. PATIENT-LVL III: CPT | Mod: PBBFAC,,, | Performed by: ORTHOPAEDIC SURGERY

## 2019-08-15 PROCEDURE — 73564 X-RAY EXAM KNEE 4 OR MORE: CPT | Mod: TC,50,FY,PO

## 2019-08-15 PROCEDURE — 20610 DRAIN/INJ JOINT/BURSA W/O US: CPT | Mod: RT,S$GLB,, | Performed by: ORTHOPAEDIC SURGERY

## 2019-08-15 PROCEDURE — 3008F BODY MASS INDEX DOCD: CPT | Mod: CPTII,S$GLB,, | Performed by: ORTHOPAEDIC SURGERY

## 2019-08-15 PROCEDURE — 99214 OFFICE O/P EST MOD 30 MIN: CPT | Mod: 25,S$GLB,, | Performed by: ORTHOPAEDIC SURGERY

## 2019-08-15 PROCEDURE — 73564 XR KNEE ORTHO BILAT WITH FLEXION: ICD-10-PCS | Mod: 26,,, | Performed by: RADIOLOGY

## 2019-08-15 PROCEDURE — 20610 LARGE JOINT ASPIRATION/INJECTION: R KNEE: ICD-10-PCS | Mod: RT,S$GLB,, | Performed by: ORTHOPAEDIC SURGERY

## 2019-08-15 PROCEDURE — 99214 PR OFFICE/OUTPT VISIT, EST, LEVL IV, 30-39 MIN: ICD-10-PCS | Mod: 25,S$GLB,, | Performed by: ORTHOPAEDIC SURGERY

## 2019-08-15 PROCEDURE — 3008F PR BODY MASS INDEX (BMI) DOCUMENTED: ICD-10-PCS | Mod: CPTII,S$GLB,, | Performed by: ORTHOPAEDIC SURGERY

## 2019-08-15 PROCEDURE — 99999 PR PBB SHADOW E&M-EST. PATIENT-LVL III: ICD-10-PCS | Mod: PBBFAC,,, | Performed by: ORTHOPAEDIC SURGERY

## 2019-08-15 PROCEDURE — 73564 X-RAY EXAM KNEE 4 OR MORE: CPT | Mod: 26,,, | Performed by: RADIOLOGY

## 2019-08-15 RX ORDER — TRIAMCINOLONE ACETONIDE 40 MG/ML
40 INJECTION, SUSPENSION INTRA-ARTICULAR; INTRAMUSCULAR
Status: DISCONTINUED | OUTPATIENT
Start: 2019-08-15 | End: 2019-08-15 | Stop reason: HOSPADM

## 2019-08-15 RX ADMIN — TRIAMCINOLONE ACETONIDE 40 MG: 40 INJECTION, SUSPENSION INTRA-ARTICULAR; INTRAMUSCULAR at 04:08

## 2019-08-15 NOTE — PROGRESS NOTES
CC: Right knee pain    Zaheer Neal is a 59 y.o. male s/p right knee meniscectomy and loose body removal who presents to clinic for evaluation of right knee pain x 1 week. Patient reports pain started after swinging a golf club last Monday. Pain is localized to the lateral knee and worse with ROM. Denies pain with weightbearing. Associated swelling that has gradually improved over the past week.    REVIEW OF SYSTEMS:   Constitution: Negative. Negative for chills, fever and night sweats.   HENT: Negative for congestion and headaches.    Eyes: Negative for blurred vision, left vision loss and right vision loss.   Cardiovascular: Negative for chest pain and syncope.   Respiratory: Negative for cough and shortness of breath.    Endocrine: Negative for polydipsia, polyphagia and polyuria.   Hematologic/Lymphatic: Negative for bleeding problem. Does not bruise/bleed easily.   Skin: Negative for dry skin, itching and rash.   Musculoskeletal: Negative for falls. Positive for right knee pain and  muscle weakness.   Gastrointestinal: Negative for abdominal pain and bowel incontinence.   Genitourinary: Negative for bladder incontinence and nocturia.   Neurological: Negative for disturbances in coordination, loss of balance and seizures.   Psychiatric/Behavioral: Negative for depression. The patient does not have insomnia.    Allergic/Immunologic: Negative for hives and persistent infections.     PAST MEDICAL HISTORY:   Past Medical History:   Diagnosis Date    Chronic urticaria 11/12/2014    Colonic polyp     Malignant neoplasm of thyroid gland     thyroid    Obesity     Post-surgical hypothyroidism     PUD (peptic ulcer disease)     Thyroid cancer     Thyroid cancer     Urticaria        PAST SURGICAL HISTORY:   Past Surgical History:   Procedure Laterality Date    CHONDRECTOMY, KNEE, SEMILUNAR CARTILAGE Right 8/12/2013    [facility]    CHONDROPLASTY-KNEE Right  8/12/2013    Performed by [facility]ru [facility]    COLONOSCOPY N/A 2/5/2018    Performed [facility]K Hitchcock [facility] (4[facility] FLR)    KNEE SURGERY  8/2013    right knee-includes scope    THYROIDECTOMY         FAMILY HISTORY:   Family History   Problem Relation Age of Onset    Breast cancer Mother     Cancer Mother         breast    Breast cancer Sister     Cancer Sister         breast    Heart disease Father 64        MI    Diabetes Neg Hx     Thyroid cancer Neg Hx        SOCIAL HISTORY:   Social History     Socioeconomic History    Marital status:      Spouse name: Aubrie    Number of children: 2    Years of education: Not on file    Highest education level: Not on file   Occupational History    Not on file   Social Needs    Financial resource strain: Not hard at all    Food insecurity:     Worry: Never true     Inability: Never true    Transportation needs:     Medical: No     Non-medical: No   Tobacco Use    Smoking status: Never Smoker    Smokeless tobacco: Never Used   Substance and Sexual Activity    Alcohol use: No     Frequency: Monthly or less     Drinks per session: 1 or 2     Binge frequency: Less than monthly     Comment: rarely     Drug use: No    Sexual activity: Yes     Partners: Female   Lifestyle    Physical activity:     Days per week: 6 days     Minutes per session: 40 min    Stress: Only a little   Relationships    Social connections:     Talks on phone: More than three times a week     Gets together: Twice a week     Attends Protestant service: Not on file     Active member of club or organization: No     Attends meetings of clubs or organizations: Never     Relationship status:    Other Topics Concern    Not on file   Social History Narrative    RM x 3, 2 kids,  [place], played [employer], [place], [employer], etc. [employer] for college. Born in [place]       MEDICATIONS:     Current Outpatient Medications:     ascorbic acid,  "vitamin C, (VITAMIN C) 500 MG tablet, Take 500 mg by mouth once daily., Disp: , Rfl:     calcium carbonate (OS-BISMARK) 600 mg calcium (1,500 mg) Tab, Take 600 mg by mouth once., Disp: , Rfl:     cholecalciferol, vitamin D3, (VITAMIN D3) 1,000 unit capsule, Take 1,000 Units by mouth once daily., Disp: , Rfl:     fish oil-omega-3 fatty acids 300-1,000 mg capsule, Take by mouth once daily., Disp: , Rfl:     Lactobacillus rhamnosus GG (CULTURELLE) 10 billion cell capsule, Take 1 capsule by mouth once daily., Disp: , Rfl:     levothyroxine (SYNTHROID) 150 MCG tablet, Take 1 tablet (150 mcg total) by mouth once daily., Disp: 90 tablet, Rfl: 3    multivitamin (THERAGRAN) per tablet, Take 1 tablet by mouth once daily., Disp: , Rfl:     pomegranate xt/pomegranat seed (POMEGRANATE ORAL), Take by mouth., Disp: , Rfl:     predniSONE (DELTASONE) 10 MG tablet, Take 2 pills a day for 3 days then 1 pill a day for 3 days, Disp: 9 tablet, Rfl: 0    pumpkin seed extract-soy germ 300 mg Cap, Take by mouth., Disp: , Rfl:     ALLERGIES:   Review of patient's allergies indicates:   Allergen Reactions    Shellfish containing products Hives       VITAL SIGNS:   Ht 6' 6" (1.981 m)   Wt 131.5 kg (290 lb)   BMI 33.51 kg/m²      PHYSICAL EXAMINATION:  General:  The patient is alert and oriented x 3.  Mood is pleasant.  Observation of ears, eyes and nose reveal no gross abnormalities.  No labored breathing observed.    RIGHT KNEE EXAMINATION     OBSERVATION / INSPECTION   Gait:   Nonantalgic   Alignment:  Neutral   Scars:   None   Muscle atrophy: Mild  Effusion:  None   Warmth:  None   Discoloration:   none     TENDERNESS / CREPITUS (T / C):          T / C      T / C   Patella   - / -   Lateral joint line   + / -   Peripatellar medial  -  Medial joint line    - / -   Peripatellar lateral -  Medial plica   - / -   Patellar tendon -   Popliteal fossa  - / -   Quad tendon   -   Gastrocnemius   -   Prepatellar Bursa - / - "   Quadricep   -   Tibial tubercle  -  Thigh/hamstring  -   Pes anserine/HS -  Fibula    -   ITB   - / -  Tibia     -   Tib/fib joint  - / -  LCL    -     MFC   - / -   MCL: Proximal  -    LFC   - / -    Distal   -          ROM: (* = pain)  PASSIVE   ACTIVE    Left :   5 / 0 / 145   5 / 0 / 145     Right :    5 / 0 / 145   5 / 0 / 145    Patellofemoral examination:  See above noted areas of tenderness.   Patella position    Subluxation / dislocation: Centered           Sup. / Inf;   Normal   Crepitus (PF):    Absent   Patellar Mobility:       Medial-lateral:   Normal    Superior-inferior:  Normal    Inferior tilt   Normal    Patellar tendon:  Normal   Lateral tilt:    Normal   J-sign:     None   Patellofemoral grind:   No pain       MENISCAL SIGNS:     Pain on terminal extension:  -  Pain on terminal flexion:  -  Corys maneuver:  -  Squat     -     LIGAMENT EXAMINATION:  ACL / Lachman:  normal (-1 to 2mm)    PCL-Post.  drawer: normal 0 to 2mm  MCL- Valgus:  normal 0 to 2mm  LCL- Varus:  normal 0 to 2mm  Pivot shift: normal (Equal)   Dial Test: difference c/w other side   At 30° flexion: normal (< 5°)    At 90° flexion: normal (< 5°)   Reverse Pivot Shift:   normal (Equal)     STRENGTH: (* = with pain) PAINFUL SIDE   Quadricep   5/5   Hamstrin/5    EXTREMITY NEURO-VASCULAR EXAMINATION:   Sensation:  Grossly intact to light touch all dermatomal regions.   Motor Function:  Fully intact motor function at hip, knee, foot and ankle    DTRs;  quadriceps and  achilles 2+.  No clonus and downgoing Babinski.    Vascular status:  DP and PT pulses 2+, brisk capillary refill, symmetric.         IMAGING:    X-rays including standing, weight bearing AP and flexion bilateral knees, lateral and merchant views ordered and images reviewed by me show:    No fracture, dislocation or other pathology  Tricompartmental degenerative changes, no significant deformity        ASSESSMENT:    Right Knee Pain, mod/severe  degenerative changes     PLAN:   1. CSI R knee today  2. Recc rest, ice, elevation, continue HEP  3. RTC PRN      All questions were answered, pt will contact us for questions or concerns in the interim.

## 2019-08-15 NOTE — PROCEDURES
Large Joint Aspiration/Injection: R knee  Date/Time: 8/15/2019 4:45 PM  Performed by: Alexandru Warner MD  Authorized by: Alexandru Warner MD     Consent Done?:  Yes (Verbal)  Indications:  Pain  Procedure site marked: Yes    Timeout: Prior to procedure the correct patient, procedure, and site was verified      Location:  Knee  Site:  R knee  Prep: Patient was prepped and draped in usual sterile fashion    Needle size:  22 G  Ultrasonic Guidance for needle placement: No  Approach:  Superior  Medications:  40 mg triamcinolone acetonide 40 mg/mL  Patient tolerance:  Patient tolerated the procedure well with no immediate complications

## 2019-09-05 NOTE — PROGRESS NOTES
Subjective:      Patient ID: Zaheer Neal is a 59 y.o. male.    Chief Complaint:  Hypothyroidism    History of Present Illness  Zaheer Neal is here for follow up of postsurgical hypothyroidism.  Previously seen by Dr. Eid.  Last seen 5/1/18.  This is their first visit with me.      With regards to hypothyroidism:  Thyroid cancer 12/2011 diagnosed in [place].   Unifocal right lobe    3.5x 2.0x 1.5 cm    Classical PTC    Margins uninvolved    extrathyroidal extension; present near flap of skeletal muscle  No LN looked at pNx  pT3, Nx, Mx       S/p I131 153 mci 4/2012      Ref. Range 6/12/2019 08:54   TSH Latest Ref Range: 0.400 - 4.000 uIU/mL 0.105 (L)   Free T4 Latest Ref Range: 0.71 - 1.51 ng/dL 1.19     Thyroid US 8/30/17  Status post thyroidectomy.  There is no sonographic evidence of malignancy in this patient with an undetectable thyroglobulin level.    Current Medication:  Synthroid 150mcg daily     Takes thyroid medication properly without food first thing in the morning.    Current Symptoms:   - Weight gain  - Fatigue   - Constipation   - Hair loss  - Brittle nails  - Mental fog   - cp, palpations or sob  - Heat intolerance  - Cold intolerance    Review of Systems   Constitutional: Negative for fatigue.   Eyes: Negative for visual disturbance.   Respiratory: Negative for shortness of breath.    Cardiovascular: Negative for chest pain.   Gastrointestinal: Negative for abdominal pain.   Musculoskeletal: Negative for arthralgias.   Skin: Negative for wound.   Neurological: Negative for headaches.   Hematological: Does not bruise/bleed easily.   Psychiatric/Behavioral: Negative for sleep disturbance.     Objective:   Physical Exam   Neck: No thyromegaly present.   Cardiovascular: Normal rate.   No edema present   Pulmonary/Chest: Effort normal.   Abdominal: Soft.   Vitals reviewed.    Visit Vitals  /78 (BP Location: Right arm, Patient Position: Sitting, BP Method: Medium  "(Manual))   Resp 16   Ht 6' 6" (1.981 m)   Wt 129.8 kg (286 lb 2.5 oz)   BMI 33.07 kg/m²     Body mass index is 33.07 kg/m².    Lab Review:   Lab Results   Component Value Date    HGBA1C 5.5 02/03/2015     Lab Results   Component Value Date    CHOL 168 06/12/2019    HDL 41 06/12/2019    LDLCALC 100.2 06/12/2019    TRIG 134 06/12/2019    CHOLHDL 24.4 06/12/2019     Lab Results   Component Value Date     06/12/2019    K 4.2 06/12/2019     06/12/2019    CO2 28 06/12/2019    GLU 98 06/12/2019    BUN 22 (H) 06/12/2019    CREATININE 1.1 06/12/2019    CALCIUM 9.3 06/12/2019    PROT 7.8 04/15/2016    ALBUMIN 4.4 04/15/2016    BILITOT 0.6 04/15/2016    ALKPHOS 79 04/15/2016    AST 50 (H) 04/15/2016    ALT 47 (H) 04/15/2016    ANIONGAP 9 06/12/2019    ESTGFRAFRICA >60 06/12/2019    EGFRNONAA >60 06/12/2019    TSH 0.105 (L) 06/12/2019     Vit D, 25-Hydroxy   Date Value Ref Range Status   02/03/2015 41 30 - 96 ng/mL Final     Comment:     Vitamin D deficiency.........<10 ng/mL                              Vitamin D insufficiency......10-29 ng/mL       Vitamin D sufficiency........> or equal to 30 ng/mL  Vitamin D toxicity............>100 ng/mL       Assessment and Plan     1. Post-surgical hypothyroidism  TSH    Comprehensive metabolic panel    Thyroglobulin    US Soft Tissue Head Neck Thyroid   2. Malignant neoplasm of thyroid gland  TSH    Comprehensive metabolic panel    Thyroglobulin    US Soft Tissue Head Neck Thyroid   3. Hyperlipidemia, unspecified hyperlipidemia type     4. Obesity (BMI 30.0-34.9)       Post-surgical hypothyroidism  -- Labs today  -- Medication Changes:   CONTINUE: Synthroid 150mcg daily; adjust based on labs today.   -- Goal is a normal TSH  -- Avoid exogenous hyperthyroidism as this can accelerate bone loss and increase risk of CV complications.  -- Advised to take LT4 on an empty stomach with water and to wait 30-45 minutes before eating or taking other medications   -- Reviewed usual " times of thyroid hormone changes  -- Reviewed that symptoms of hypothyroidism may not correlate with tsh, and a normal TSH is the goal of therapy. Symptoms are not a justification for over treatment.    Malignant neoplasm of thyroid gland  -- Check Tg today  -- Thyroid US     Hyperlipidemia  -- Stable.     Obesity (BMI 30.0-34.9)  -- Encouraged dietary and lifestyle modifications.  -- Emphasized weight loss goals.    Follow up in about 1 year (around 9/9/2020).

## 2019-09-09 ENCOUNTER — OFFICE VISIT (OUTPATIENT)
Dept: ENDOCRINOLOGY | Facility: CLINIC | Age: 59
End: 2019-09-09
Payer: COMMERCIAL

## 2019-09-09 VITALS
DIASTOLIC BLOOD PRESSURE: 78 MMHG | HEIGHT: 78 IN | WEIGHT: 286.19 LBS | SYSTOLIC BLOOD PRESSURE: 118 MMHG | RESPIRATION RATE: 16 BRPM | BODY MASS INDEX: 33.11 KG/M2

## 2019-09-09 DIAGNOSIS — E89.0 POST-SURGICAL HYPOTHYROIDISM: Primary | ICD-10-CM

## 2019-09-09 DIAGNOSIS — E78.5 HYPERLIPIDEMIA, UNSPECIFIED HYPERLIPIDEMIA TYPE: ICD-10-CM

## 2019-09-09 DIAGNOSIS — E66.9 OBESITY (BMI 30.0-34.9): ICD-10-CM

## 2019-09-09 DIAGNOSIS — C73 MALIGNANT NEOPLASM OF THYROID GLAND: ICD-10-CM

## 2019-09-09 PROCEDURE — 3008F PR BODY MASS INDEX (BMI) DOCUMENTED: ICD-10-PCS | Mod: CPTII,S$GLB,, | Performed by: NURSE PRACTITIONER

## 2019-09-09 PROCEDURE — 99214 OFFICE O/P EST MOD 30 MIN: CPT | Mod: S$GLB,,, | Performed by: NURSE PRACTITIONER

## 2019-09-09 PROCEDURE — 99999 PR PBB SHADOW E&M-EST. PATIENT-LVL III: ICD-10-PCS | Mod: PBBFAC,,, | Performed by: NURSE PRACTITIONER

## 2019-09-09 PROCEDURE — 3008F BODY MASS INDEX DOCD: CPT | Mod: CPTII,S$GLB,, | Performed by: NURSE PRACTITIONER

## 2019-09-09 PROCEDURE — 99214 PR OFFICE/OUTPT VISIT, EST, LEVL IV, 30-39 MIN: ICD-10-PCS | Mod: S$GLB,,, | Performed by: NURSE PRACTITIONER

## 2019-09-09 PROCEDURE — 99999 PR PBB SHADOW E&M-EST. PATIENT-LVL III: CPT | Mod: PBBFAC,,, | Performed by: NURSE PRACTITIONER

## 2019-09-09 NOTE — ASSESSMENT & PLAN NOTE
-- Labs today  -- Medication Changes:   CONTINUE: Synthroid 150mcg daily; adjust based on labs today.   -- Goal is a normal TSH  -- Avoid exogenous hyperthyroidism as this can accelerate bone loss and increase risk of CV complications.  -- Advised to take LT4 on an empty stomach with water and to wait 30-45 minutes before eating or taking other medications   -- Reviewed usual times of thyroid hormone changes  -- Reviewed that symptoms of hypothyroidism may not correlate with tsh, and a normal TSH is the goal of therapy. Symptoms are not a justification for over treatment.

## 2019-09-10 ENCOUNTER — PATIENT MESSAGE (OUTPATIENT)
Dept: ENDOCRINOLOGY | Facility: CLINIC | Age: 59
End: 2019-09-10

## 2019-09-10 ENCOUNTER — HOSPITAL ENCOUNTER (OUTPATIENT)
Dept: RADIOLOGY | Facility: HOSPITAL | Age: 59
Discharge: HOME | End: 2019-09-10
Attending: NURSE PRACTITIONER
Payer: COMMERCIAL

## 2019-09-10 DIAGNOSIS — E89.0 POST-SURGICAL HYPOTHYROIDISM: ICD-10-CM

## 2019-09-10 DIAGNOSIS — E89.0 POST-SURGICAL HYPOTHYROIDISM: Primary | ICD-10-CM

## 2019-09-10 DIAGNOSIS — C73 MALIGNANT NEOPLASM OF THYROID GLAND: ICD-10-CM

## 2019-09-10 PROCEDURE — 76536 US EXAM OF HEAD AND NECK: CPT | Mod: TC,PO

## 2019-09-10 RX ORDER — LEVOTHYROXINE SODIUM 150 UG/1
150 TABLET ORAL DAILY
Qty: 90 TABLET | Refills: 4 | Status: SHIPPED | OUTPATIENT
Start: 2019-09-10 | End: 2019-09-10

## 2019-09-10 RX ORDER — LEVOTHYROXINE SODIUM 150 MCG
150 TABLET ORAL DAILY
Qty: 90 TABLET | Refills: 3 | Status: SHIPPED | OUTPATIENT
Start: 2019-09-10 | End: 2019-10-14

## 2019-09-11 ENCOUNTER — PATIENT MESSAGE (OUTPATIENT)
Dept: ENDOCRINOLOGY | Facility: CLINIC | Age: 59
End: 2019-09-11

## 2019-09-17 ENCOUNTER — CLINICAL SUPPORT (OUTPATIENT)
Dept: OTHER | Facility: CLINIC | Age: 59
End: 2019-09-17
Payer: COMMERCIAL

## 2019-09-17 DIAGNOSIS — Z00.8 ENCOUNTER FOR OTHER GENERAL EXAMINATION: ICD-10-CM

## 2019-09-17 PROCEDURE — 82947 ASSAY GLUCOSE BLOOD QUANT: CPT | Mod: QW,S$GLB,, | Performed by: INTERNAL MEDICINE

## 2019-09-17 PROCEDURE — 99401 PR PREVENT COUNSEL,INDIV,15 MIN: ICD-10-PCS | Mod: S$GLB,,, | Performed by: INTERNAL MEDICINE

## 2019-09-17 PROCEDURE — 82947 PR  ASSAY QUANTITATIVE,BLOOD GLUCOSE: ICD-10-PCS | Mod: QW,S$GLB,, | Performed by: INTERNAL MEDICINE

## 2019-09-17 PROCEDURE — 80061 LIPID PANEL: CPT | Mod: QW,S$GLB,, | Performed by: INTERNAL MEDICINE

## 2019-09-17 PROCEDURE — 99401 PREV MED CNSL INDIV APPRX 15: CPT | Mod: S$GLB,,, | Performed by: INTERNAL MEDICINE

## 2019-09-17 PROCEDURE — 80061 PR  LIPID PANEL: ICD-10-PCS | Mod: QW,S$GLB,, | Performed by: INTERNAL MEDICINE

## 2019-09-18 VITALS — HEIGHT: 78 IN | BODY MASS INDEX: 33.07 KG/M2

## 2019-09-18 LAB
GLUCOSE SERPL-MCNC: 100 MG/DL (ref 60–140)
HDLC SERPL-MCNC: 40 MG/DL
POC CHOLESTEROL, LDL (DOCK): 87 MG/DL
POC CHOLESTEROL, TOTAL: 164 MG/DL
TRIGL SERPL-MCNC: 186 MG/DL

## 2019-10-13 DIAGNOSIS — E89.0 POST-SURGICAL HYPOTHYROIDISM: Primary | ICD-10-CM

## 2019-10-14 RX ORDER — LEVOTHYROXINE SODIUM 150 UG/1
TABLET ORAL
Qty: 90 TABLET | Refills: 4 | OUTPATIENT
Start: 2019-10-14

## 2019-10-14 RX ORDER — LEVOTHYROXINE SODIUM 150 MCG
TABLET ORAL
Qty: 90 TABLET | Refills: 4 | Status: SHIPPED | OUTPATIENT
Start: 2019-10-14 | End: 2020-05-12 | Stop reason: SDUPTHER

## 2020-05-12 ENCOUNTER — OFFICE VISIT (OUTPATIENT)
Dept: ENDOCRINOLOGY | Facility: CLINIC | Age: 60
End: 2020-05-12
Payer: COMMERCIAL

## 2020-05-12 VITALS
HEART RATE: 84 BPM | WEIGHT: 295.75 LBS | SYSTOLIC BLOOD PRESSURE: 118 MMHG | HEIGHT: 78 IN | BODY MASS INDEX: 34.22 KG/M2 | DIASTOLIC BLOOD PRESSURE: 78 MMHG

## 2020-05-12 DIAGNOSIS — C73 MALIGNANT NEOPLASM OF THYROID GLAND: Primary | ICD-10-CM

## 2020-05-12 DIAGNOSIS — E89.0 POST-SURGICAL HYPOTHYROIDISM: ICD-10-CM

## 2020-05-12 DIAGNOSIS — E66.9 OBESITY (BMI 30.0-34.9): ICD-10-CM

## 2020-05-12 PROCEDURE — 99999 PR PBB SHADOW E&M-EST. PATIENT-LVL III: CPT | Mod: PBBFAC,,, | Performed by: HOSPITALIST

## 2020-05-12 PROCEDURE — 99999 PR PBB SHADOW E&M-EST. PATIENT-LVL III: ICD-10-PCS | Mod: PBBFAC,,, | Performed by: HOSPITALIST

## 2020-05-12 PROCEDURE — 99214 PR OFFICE/OUTPT VISIT, EST, LEVL IV, 30-39 MIN: ICD-10-PCS | Mod: S$GLB,,, | Performed by: HOSPITALIST

## 2020-05-12 PROCEDURE — 99214 OFFICE O/P EST MOD 30 MIN: CPT | Mod: S$GLB,,, | Performed by: HOSPITALIST

## 2020-05-12 RX ORDER — LEVOTHYROXINE SODIUM 150 MCG
150 TABLET ORAL
Qty: 90 TABLET | Refills: 4 | Status: SHIPPED | OUTPATIENT
Start: 2020-05-12 | End: 2021-06-25 | Stop reason: SDUPTHER

## 2020-05-12 NOTE — PATIENT INSTRUCTIONS
Thank you for completing  visit with me    Per our conversation: Do lab work today, schedule your thyroid US  Once I get your lab work, I will then refill your Synthroid    We will plan an in-clinic visit in 12 months with me or my team    My staff will contact you to schedule the above.     Please let me know if you have any other questions.    Thank you,  Jesse Johnston MD  Endocrinology Fellow  [facility], 6th Floor  [address] [place], [place], [address]    Office: [phone]  Fax: [phone]

## 2020-05-12 NOTE — ASSESSMENT & PLAN NOTE
- taking it as directed  - goal TSH 0.5 to 2.0  - checking TSH level today  - refills given      Update 5/12/2020 12:42 PM: TFTs are stable    Ref. Range 5/12/2020 09:38   TSH Latest Ref Range: 0.400 - 4.000 uIU/mL 0.567   Free T4 Latest Ref Range: 0.71 - 1.51 ng/dL 1.07

## 2020-05-12 NOTE — ASSESSMENT & PLAN NOTE
- checking A1C given weight gain, BMI >34  - mildly elevated fasting glucose  - advised dietary modification for weight loss    Update   A1C of 5.6, stable

## 2020-05-12 NOTE — ASSESSMENT & PLAN NOTE
Intermediate risk given extrathyroidal extension, pT3, Nx, Mx  Responded well to therapy given low TG level  TSH goal 0.5-2.0  Pt was concern about lymph node R side noted on last US in 2019. Images reviewed  Reassurance given   US thyroid ordered to evaluate

## 2020-05-12 NOTE — PROGRESS NOTES
Subjective:      Patient ID: Zaheer Neal is a 60 y.o. male.    Chief Complaint:  Hypothyroidism      History of Present Illness  Zaheer Neal is here for follow up of postsurgical hypothyroidism.  Previously seen by Dr. Eid and MARCIE Jarrett.  Last seen 9/9/19.  This is their first visit with me.       Interval Hx:. No issue since last seen in clinic. He did notice that he had lymph node on the last US and had question. Compliance with medication  Takes thyroid medication properly without food first thing in the morning.    Current Symptoms:   - Weight gain  - Fatigue   - Constipation   - Hair loss  - Brittle nails  - Mental fog   - CP, palpations or sob  - Heat intolerance  - Cold intolerance      With regards to hypothyroidism:  Thyroid cancer 12/2011 diagnosed in [place].   Unifocal right lobe    3.5x 2.0x 1.5 cm    Classical PTC    Margins uninvolved    extrathyroidal extension; present near flap of skeletal muscle  No LN looked at pNx  pT3, Nx, Mx       S/p I131 153 mci 4/2012      Thyroid US 8/30/17  Status post thyroidectomy.  There is no sonographic evidence of malignancy in this patient with an undetectable thyroglobulin level.     Thyroid US 9/10/2019  There is a normal appearing lymph node on the right side of the neck. It measures 15 mm x 6 mm x 10 mm.    Current Medication:  (name brand) Synthroid 150mcg daily      Weight gain, now at 295  Denies hx of DM  No A1C ordered   No polyuria, polydipsia      Review of Systems   Constitutional: Negative for activity change and unexpected weight change.   HENT: Negative for sore throat and voice change.    Eyes: Negative for visual disturbance.   Respiratory: Negative for shortness of breath.    Cardiovascular: Negative for chest pain.   Gastrointestinal: Negative for abdominal pain, constipation, diarrhea, nausea and vomiting.   Genitourinary: Negative for urgency.   Musculoskeletal: Negative for arthralgias.   Skin: Negative for wound.  "  Neurological: Negative for headaches.   Psychiatric/Behavioral: Negative for confusion and sleep disturbance.       Objective:   Physical Exam   Constitutional: He appears well-developed and well-nourished.   HENT:   Head: Normocephalic.   Eyes: Conjunctivae are normal. No scleral icterus.   Neck: Neck supple. No thyromegaly (thyroidectomy scar noted, no lymphadenopathy noted) present.   Cardiovascular: Normal rate and normal heart sounds.   Pulmonary/Chest: Effort normal. No respiratory distress.   Abdominal: Soft. There is no tenderness.   Musculoskeletal: Normal range of motion. He exhibits no edema.   Neurological: He is alert. Coordination normal.   Skin: Skin is warm. No erythema.   Psychiatric: His behavior is normal.   Nursing note and vitals reviewed.        /78   Pulse 84   Ht 6' 6" (1.981 m)   Wt 134.1 kg (295 lb 12 oz)   BMI 34.18 kg/m²     Body mass index is 34.18 kg/m².    Lab Review:   Lab Results   Component Value Date    HGBA1C 5.6 05/12/2020     Lab Results   Component Value Date    CHOL 168 06/12/2019    HDL 41 06/12/2019    LDLCALC 100.2 06/12/2019    TRIG 134 06/12/2019    CHOLHDL 24.4 06/12/2019     Lab Results   Component Value Date     09/09/2019    K 4.1 09/09/2019     09/09/2019    CO2 27 09/09/2019     09/09/2019    BUN 18 09/09/2019    CREATININE 1.1 09/09/2019    CALCIUM 9.0 09/09/2019    PROT 7.6 09/09/2019    ALBUMIN 4.1 09/09/2019    BILITOT 0.4 09/09/2019    ALKPHOS 88 09/09/2019    AST 32 09/09/2019    ALT 31 09/09/2019    ANIONGAP 11 09/09/2019    ESTGFRAFRICA >60.0 09/09/2019    EGFRNONAA >60.0 09/09/2019    TSH 0.567 05/12/2020     Vit D, 25-Hydroxy   Date Value Ref Range Status   02/03/2015 41 30 - 96 ng/mL Final     Comment:     Vitamin D deficiency.........<10 ng/mL                              Vitamin D insufficiency......10-29 ng/mL       Vitamin D sufficiency........> or equal to 30 ng/mL  Vitamin D toxicity............>100 ng/mL   "         Assessment and Plan     Malignant neoplasm of thyroid gland  Intermediate risk given extrathyroidal extension, pT3, Nx, Mx  Responded well to therapy given low TG level  TSH goal 0.5-2.0  Pt was concern about lymph node R side noted on last US in 2019. Images reviewed  Reassurance given   US thyroid ordered to evaluate      Post-surgical hypothyroidism  - taking it as directed  - goal TSH 0.5 to 2.0  - checking TSH level today  - refills given      Update 5/12/2020 12:42 PM: TFTs are stable    Ref. Range 5/12/2020 09:38   TSH Latest Ref Range: 0.400 - 4.000 uIU/mL 0.567   Free T4 Latest Ref Range: 0.71 - 1.51 ng/dL 1.07       Obesity (BMI 30.0-34.9)  - checking A1C given weight gain, BMI >34  - mildly elevated fasting glucose  - advised dietary modification for weight loss    Update   A1C of 5.6, stable      Jesse Johnston MD  Endocrinology Fellow  5/12/2020 12:51 PM

## 2020-05-14 ENCOUNTER — HOSPITAL ENCOUNTER (OUTPATIENT)
Dept: ENDOCRINOLOGY | Facility: CLINIC | Age: 60
Discharge: HOME | End: 2020-05-14
Attending: HOSPITALIST
Payer: COMMERCIAL

## 2020-05-14 DIAGNOSIS — C73 MALIGNANT NEOPLASM OF THYROID GLAND: ICD-10-CM

## 2020-05-14 PROCEDURE — 76536 US EXAM OF HEAD AND NECK: CPT | Mod: S$GLB,,, | Performed by: INTERNAL MEDICINE

## 2020-05-14 PROCEDURE — 76536 US SOFT TISSUE HEAD NECK THYROID: ICD-10-PCS | Mod: S$GLB,,, | Performed by: INTERNAL MEDICINE

## 2020-05-15 ENCOUNTER — PATIENT MESSAGE (OUTPATIENT)
Dept: ENDOCRINOLOGY | Facility: CLINIC | Age: 60
End: 2020-05-15

## 2020-06-04 ENCOUNTER — PATIENT OUTREACH (OUTPATIENT)
Dept: ADMINISTRATIVE | Facility: HOSPITAL | Age: 60
End: 2020-06-04

## 2020-06-18 ENCOUNTER — OFFICE VISIT (OUTPATIENT)
Dept: INTERNAL MEDICINE | Facility: CLINIC | Age: 60
End: 2020-06-18
Attending: FAMILY MEDICINE
Payer: COMMERCIAL

## 2020-06-18 VITALS
HEIGHT: 78 IN | BODY MASS INDEX: 33 KG/M2 | OXYGEN SATURATION: 98 % | SYSTOLIC BLOOD PRESSURE: 116 MMHG | HEART RATE: 67 BPM | WEIGHT: 285.25 LBS | DIASTOLIC BLOOD PRESSURE: 82 MMHG

## 2020-06-18 DIAGNOSIS — Z00.00 ANNUAL PHYSICAL EXAM: Primary | ICD-10-CM

## 2020-06-18 DIAGNOSIS — E03.9 HYPOTHYROIDISM (ACQUIRED): ICD-10-CM

## 2020-06-18 DIAGNOSIS — N40.1 BENIGN PROSTATIC HYPERPLASIA WITH NOCTURIA: ICD-10-CM

## 2020-06-18 DIAGNOSIS — R35.1 BENIGN PROSTATIC HYPERPLASIA WITH NOCTURIA: ICD-10-CM

## 2020-06-18 DIAGNOSIS — E89.0 POST-SURGICAL HYPOTHYROIDISM: ICD-10-CM

## 2020-06-18 DIAGNOSIS — Z12.5 PROSTATE CANCER SCREENING: ICD-10-CM

## 2020-06-18 PROCEDURE — 99396 PR PREVENTIVE VISIT,EST,40-64: ICD-10-PCS | Mod: S$GLB,,, | Performed by: FAMILY MEDICINE

## 2020-06-18 PROCEDURE — 99999 PR PBB SHADOW E&M-EST. PATIENT-LVL IV: ICD-10-PCS | Mod: PBBFAC,,, | Performed by: FAMILY MEDICINE

## 2020-06-18 PROCEDURE — 99396 PREV VISIT EST AGE 40-64: CPT | Mod: S$GLB,,, | Performed by: FAMILY MEDICINE

## 2020-06-18 PROCEDURE — 99999 PR PBB SHADOW E&M-EST. PATIENT-LVL IV: CPT | Mod: PBBFAC,,, | Performed by: FAMILY MEDICINE

## 2020-06-18 RX ORDER — TAMSULOSIN HYDROCHLORIDE 0.4 MG/1
0.4 CAPSULE ORAL DAILY
Qty: 90 CAPSULE | Refills: 3 | Status: SHIPPED | OUTPATIENT
Start: 2020-06-18 | End: 2021-06-11

## 2020-06-18 NOTE — PATIENT INSTRUCTIONS
See standing or future lab orders and please draw BMP, Lipids and PSA - today, thank you.    Information about cholesterol, high blood pressure and healthy diet and activity recommendations can be found at the following links on the Internet:    http://www.nhlbi.nih.gov/health/health-topics/topics/hbc  http://www.nhlbi.nih.gov/health/educational/lose_wt/index.htm  Http://www.nhlbi.nih.gov/files/docs/public/heart/hbp_low.pdf  http://www.heart.org/HEARTORG/  http://diabetes.org/  https://www.cdc.gov/  Https://healthfinder.gov/  https://health.gov/dietaryguidelines/2015/guidelines/  https://health.gov/paguidelines/second-edition/pdf/Physical_Activity_Guidelines_2nd_edition.pdf

## 2020-06-18 NOTE — PROGRESS NOTES
Subjective:       Patient ID: Zaheer Neal is a 60 y.o. male.    Chief Complaint: Annual Exam    Established patient for an annual wellness check/physical exam and also chronic disease management. Specific complaints - see dictation and please see ROS.  P, S, Fm, Soc Hx's; Meds, allergies reviewed and reconciled.  Health maintenance file reviewed and addressed items due.        Review of Systems   Constitutional: Negative for activity change, appetite change, chills, diaphoresis, fatigue, fever and unexpected weight change.   HENT: Negative for congestion, hearing loss, postnasal drip, rhinorrhea, sore throat and trouble swallowing.    Eyes: Negative for discharge and visual disturbance.   Respiratory: Negative for cough, choking, chest tightness, shortness of breath and wheezing.    Cardiovascular: Negative for chest pain, palpitations and leg swelling.   Gastrointestinal: Negative for abdominal distention, abdominal pain, blood in stool, constipation, diarrhea, nausea and vomiting.   Endocrine: Positive for polyuria. Negative for polydipsia.   Genitourinary: Negative for difficulty urinating, hematuria and urgency.   Musculoskeletal: Positive for arthralgias. Negative for joint swelling, myalgias and neck pain.   Skin: Negative for rash.   Neurological: Negative for weakness, light-headedness and headaches.   Psychiatric/Behavioral: Negative for confusion and dysphoric mood.       Objective:      Physical Exam  Vitals signs and nursing note reviewed.   Constitutional:       Appearance: He is well-developed. He is not diaphoretic.   Eyes:      General: No scleral icterus.  Neck:      Musculoskeletal: Normal range of motion and neck supple.      Thyroid: No thyromegaly.      Vascular: No carotid bruit or JVD.      Trachea: No tracheal deviation.   Cardiovascular:      Rate and Rhythm: Normal rate and regular rhythm.      Heart sounds: Normal heart sounds. No murmur. No friction rub. No gallop.     Pulmonary:      Effort: Pulmonary effort is normal. No respiratory distress.      Breath sounds: Normal breath sounds. No wheezing or rales.   Abdominal:      General: There is no distension.      Palpations: Abdomen is soft. There is no mass.      Tenderness: There is no abdominal tenderness. There is no guarding or rebound.   Lymphadenopathy:      Cervical: No cervical adenopathy.   Skin:     General: Skin is warm and dry.      Findings: No erythema or rash.   Neurological:      Mental Status: He is alert and oriented to person, place, and time.      Cranial Nerves: No cranial nerve deficit.      Motor: No tremor.      Coordination: Coordination normal.      Gait: Gait normal.   Psychiatric:         Behavior: Behavior normal.         Thought Content: Thought content normal.         Judgment: Judgment normal.         Assessment:       1. Annual physical exam    2. Hypothyroidism (acquired)    3. Post-surgical hypothyroidism    4. Prostate cancer screening    5. Benign prostatic hyperplasia with nocturia        Plan:     Medication List with Changes/Refills   New Medications    TAMSULOSIN (FLOMAX) 0.4 MG CAP    Take 1 capsule (0.4 mg total) by mouth once daily.   Current Medications    ASCORBIC ACID, VITAMIN C, (VITAMIN C) 500 MG TABLET    Take 500 mg by mouth once daily.    CALCIUM CARBONATE (OS-BISMARK) 600 MG CALCIUM (1,500 MG) TAB    Take 600 mg by mouth once.    CHOLECALCIFEROL, VITAMIN D3, (VITAMIN D3) 1,000 UNIT CAPSULE    Take 1,000 Units by mouth once daily.    FISH OIL-OMEGA-3 FATTY ACIDS 300-1,000 MG CAPSULE    Take by mouth once daily.    LACTOBACILLUS RHAMNOSUS GG (CULTURELLE) 10 BILLION CELL CAPSULE    Take 1 capsule by mouth once daily.    MULTIVITAMIN (THERAGRAN) PER TABLET    Take 1 tablet by mouth once daily.    POMEGRANATE XT/POMEGRANAT SEED (POMEGRANATE ORAL)    Take by mouth.    PUMPKIN SEED EXTRACT-SOY GERM 300 MG CAP    Take by mouth.    SYNTHROID 150 MCG TABLET    Take 1 tablet (150 mcg total)  by mouth before breakfast.     Zaheer was seen today for annual exam.    Diagnoses and all orders for this visit:    Annual physical exam  -     Basic metabolic panel; Future  -     Lipid Panel; Future  -     PSA, Screening; Future    Hypothyroidism (acquired)    Post-surgical hypothyroidism    Prostate cancer screening  -     PSA, Screening; Future    Benign prostatic hyperplasia with nocturia  -     tamsulosin (FLOMAX) 0.4 mg Cap; Take 1 capsule (0.4 mg total) by mouth once daily.      See meds, orders, follow up, routing and instructions sections of encounter and AVS. Discussed with patient and provided on AVS.      Discussed diet and exercise and links provided on AVS for detailed information.    Hx elev PSA, bx neg in 2018. BPH sx. Trial of Flomax, side effects and potential for orthostasis reviewed.

## 2020-07-18 ENCOUNTER — TELEPHONE (OUTPATIENT)
Dept: INTERNAL MEDICINE | Facility: CLINIC | Age: 60
End: 2020-07-18

## 2020-07-18 DIAGNOSIS — R97.20 ELEVATED PSA: Primary | ICD-10-CM

## 2020-07-18 NOTE — TELEPHONE ENCOUNTER
Please advise patient that his PSA test, prostate screening, was in the normal range this year.  It was a little bit higher than his baseline a couple of years ago, and I recommend that he get a routine follow-up in the Urology Department.    Please see referral orders and please call patient to schedule a consult with urology. Thank you.

## 2020-07-22 ENCOUNTER — PATIENT OUTREACH (OUTPATIENT)
Dept: ADMINISTRATIVE | Facility: OTHER | Age: 60
End: 2020-07-22

## 2020-07-23 ENCOUNTER — OFFICE VISIT (OUTPATIENT)
Dept: UROLOGY | Facility: CLINIC | Age: 60
End: 2020-07-23
Attending: FAMILY MEDICINE
Payer: COMMERCIAL

## 2020-07-23 VITALS
WEIGHT: 281.31 LBS | DIASTOLIC BLOOD PRESSURE: 69 MMHG | SYSTOLIC BLOOD PRESSURE: 114 MMHG | HEIGHT: 78 IN | BODY MASS INDEX: 32.55 KG/M2 | HEART RATE: 73 BPM

## 2020-07-23 DIAGNOSIS — R97.20 ELEVATED PSA: ICD-10-CM

## 2020-07-23 PROCEDURE — 3008F PR BODY MASS INDEX (BMI) DOCUMENTED: ICD-10-PCS | Mod: CPTII,S$GLB,, | Performed by: UROLOGY

## 2020-07-23 PROCEDURE — 3008F BODY MASS INDEX DOCD: CPT | Mod: CPTII,S$GLB,, | Performed by: UROLOGY

## 2020-07-23 PROCEDURE — 99999 PR PBB SHADOW E&M-EST. PATIENT-LVL IV: CPT | Mod: PBBFAC,,, | Performed by: UROLOGY

## 2020-07-23 PROCEDURE — 99213 OFFICE O/P EST LOW 20 MIN: CPT | Mod: S$GLB,,, | Performed by: UROLOGY

## 2020-07-23 PROCEDURE — 99213 PR OFFICE/OUTPT VISIT, EST, LEVL III, 20-29 MIN: ICD-10-PCS | Mod: S$GLB,,, | Performed by: UROLOGY

## 2020-07-23 PROCEDURE — 99999 PR PBB SHADOW E&M-EST. PATIENT-LVL IV: ICD-10-PCS | Mod: PBBFAC,,, | Performed by: UROLOGY

## 2020-07-23 NOTE — PROGRESS NOTES
Subjective:       Patient ID: Zaheer Neal is a 60 y.o. male.    Chief Complaint: Elevated PSA    HPI patient is here for prostate check.  He has a history of an elevated PSA with a negative biopsy by Dr. Zaheer Ricks.  His PSA was close 20 and now is down to 3.9.  He was having some lower urinary tract symptoms but was started on Flomax and his symptoms have improved greatly.  He empties his bladder    Past Medical History:   Diagnosis Date    Chronic urticaria 11/12/2014    Colonic polyp     Malignant neoplasm of thyroid gland     thyroid    Obesity     Post-surgical hypothyroidism     PUD (peptic ulcer disease)     Thyroid cancer     Thyroid cancer     Urticaria        Past Surgical History:   Procedure Laterality Date    COLONOSCOPY N/A 2/5/2018    Procedure: COLONOSCOPY;  Surgeon: ABHISHEK Hitchcock MD;  Location: [facility] ([address] [place] [address]);  Service: Endoscopy;  Laterality: N/A;  confirmed appt.    KNEE SURGERY  8/2013    right knee-includes scope    THYROIDECTOMY         Family History   Problem Relation Age of Onset    Breast cancer Mother     Cancer Mother         breast    Breast cancer Sister     Cancer Sister         breast    Heart disease Father 64        MI    Diabetes Neg Hx     Thyroid cancer Neg Hx        Social History     Socioeconomic History    Marital status:      Spouse name: Aubrie    Number of children: 2    Years of education: Not on file    Highest education level: Not on file   Occupational History    Not on file   Social Needs    Financial resource strain: Not hard at all    Food insecurity     Worry: Never true     Inability: Never true    Transportation needs     Medical: No     Non-medical: No   Tobacco Use    Smoking status: Never Smoker    Smokeless tobacco: Never Used   Substance and Sexual Activity    Alcohol use: No     Frequency: Monthly or less     Drinks per session: 1 or 2     Binge frequency: Less than monthly     Comment: rarely      Drug use: No    Sexual activity: Yes     Partners: Female   Lifestyle    Physical activity     Days per week: 6 days     Minutes per session: 40 min    Stress: Only a little   Relationships    Social connections     Talks on phone: More than three times a week     Gets together: Twice a week     Attends Yazdanism service: Not on file     Active member of club or organization: No     Attends meetings of clubs or organizations: Never     Relationship status:    Other Topics Concern    Not on file   Social History Narrative    RM x 3, 2 kids,  [employer] [employer], played [employer], [place], [employer], etc. [facility] for [email or web address]. Born in [facility]       Allergies:  Shellfish containing products    Medications:    Current Outpatient Medications:     ascorbic acid, vitamin C, (VITAMIN C) 500 MG tablet, Take 500 mg by mouth once daily., Disp: , Rfl:     calcium carbonate (OS-BISMARK) 600 mg calcium (1,500 mg) Tab, Take 600 mg by mouth once., Disp: , Rfl:     cholecalciferol, vitamin D3, (VITAMIN D3) 1,000 unit capsule, Take 1,000 Units by mouth once daily., Disp: , Rfl:     fish oil-omega-3 fatty acids 300-1,000 mg capsule, Take by mouth once daily., Disp: , Rfl:     Lactobacillus rhamnosus GG (CULTURELLE) 10 billion cell capsule, Take 1 capsule by mouth once daily., Disp: , Rfl:     multivitamin (THERAGRAN) per tablet, Take 1 tablet by mouth once daily., Disp: , Rfl:     pomegranate xt/pomegranat seed (POMEGRANATE ORAL), Take by mouth., Disp: , Rfl:     pumpkin seed extract-soy germ 300 mg Cap, Take by mouth., Disp: , Rfl:     SYNTHROID 150 mcg tablet, Take 1 tablet (150 mcg total) by mouth before breakfast., Disp: 90 tablet, Rfl: 4    tamsulosin (FLOMAX) 0.4 mg Cap, Take 1 capsule (0.4 mg total) by mouth once daily., Disp: 90 capsule, Rfl: 3    Review of Systems   Constitutional: Negative for activity change, appetite change, chills, diaphoresis, fatigue, fever and unexpected weight change.   HENT:  Negative for congestion, dental problem, hearing loss, mouth sores, postnasal drip, rhinorrhea, sinus pressure and trouble swallowing.    Eyes: Negative for pain, discharge and itching.   Respiratory: Negative for apnea, cough, choking, chest tightness, shortness of breath and wheezing.    Cardiovascular: Negative for chest pain, palpitations and leg swelling.   Gastrointestinal: Negative for abdominal distention, abdominal pain, anal bleeding, blood in stool, constipation, diarrhea, nausea, rectal pain and vomiting.   Endocrine: Negative for polydipsia and polyuria.   Genitourinary: Negative for decreased urine volume, difficulty urinating, discharge, dysuria, enuresis, flank pain, frequency, genital sores, hematuria, penile pain, penile swelling, scrotal swelling, testicular pain and urgency.   Musculoskeletal: Negative for arthralgias, back pain and myalgias.   Skin: Negative for color change, rash and wound.   Neurological: Negative for dizziness, syncope, speech difficulty, light-headedness and headaches.   Hematological: Negative for adenopathy. Does not bruise/bleed easily.   Psychiatric/Behavioral: Negative for behavioral problems, confusion, hallucinations and sleep disturbance.       Objective:      Physical Exam   Constitutional: He appears well-developed.   HENT:   Head: Normocephalic.   Cardiovascular: Normal rate.    Pulmonary/Chest: Effort normal.   Abdominal: Soft.   Genitourinary:    Prostate normal.      Genitourinary Comments: 30 g benign     Neurological: He is alert.   Skin: Skin is warm.         Assessment:       1. Elevated PSA        Plan:       Zaheer was seen today for elevated psa.    Diagnoses and all orders for this visit:    Elevated PSA  -     Ambulatory referral/consult to Urology  -     Prostate Specific Antigen, Diagnostic; Future        return clinic 6 months with PSA

## 2020-07-23 NOTE — LETTER
July 23, 2020      Silvino Zuñiga MD  [address]  [facility] [address]           [facility] 4[facility]  [address]  [facility] [address]  Phone: [phone]          Patient: Zaheer Neal   MR Number: [id number]   YOB: 1960   Date of Visit: 7/23/2020       Dear Dr. Silvino Zuñiga:    Thank you for referring Zaheer Neal to me for evaluation. Attached you will find relevant portions of my assessment and plan of care.    If you have questions, please do not hesitate to call me. I look forward to following Zaheer Neal along with you.    Sincerely,    Roberto Carlos Ocasio Jr., MD    Enclosure  CC:  No Recipients    If you would like to receive this communication electronically, please contact [email or web address] or [phone] to request more information on [facility] [employer] access.    For providers and/or their staff who would like to refer a patient to [facility], please contact us through our one-stop-shop provider referral line, [facility], at [phone].    If you feel you have received this communication in error or would no longer like to receive these types of communications, please e-mail [email or web address]

## 2020-07-26 NOTE — PROGRESS NOTES
I, Tara Eid, have personally taken the history and I agree with Dr. Johnston's assessment and plan.

## 2020-09-01 ENCOUNTER — CLINICAL SUPPORT (OUTPATIENT)
Dept: OTHER | Facility: CLINIC | Age: 60
End: 2020-09-01
Payer: COMMERCIAL

## 2020-09-01 DIAGNOSIS — Z00.8 ENCOUNTER FOR OTHER GENERAL EXAMINATION: ICD-10-CM

## 2020-09-01 PROCEDURE — 82947 ASSAY GLUCOSE BLOOD QUANT: CPT | Mod: QW,S$GLB,, | Performed by: INTERNAL MEDICINE

## 2020-09-01 PROCEDURE — 99401 PR PREVENT COUNSEL,INDIV,15 MIN: ICD-10-PCS | Mod: S$GLB,,, | Performed by: INTERNAL MEDICINE

## 2020-09-01 PROCEDURE — 82947 PR  ASSAY QUANTITATIVE,BLOOD GLUCOSE: ICD-10-PCS | Mod: QW,S$GLB,, | Performed by: INTERNAL MEDICINE

## 2020-09-01 PROCEDURE — 80061 LIPID PANEL: CPT | Mod: QW,S$GLB,, | Performed by: INTERNAL MEDICINE

## 2020-09-01 PROCEDURE — 99401 PREV MED CNSL INDIV APPRX 15: CPT | Mod: S$GLB,,, | Performed by: INTERNAL MEDICINE

## 2020-09-01 PROCEDURE — 80061 PR  LIPID PANEL: ICD-10-PCS | Mod: QW,S$GLB,, | Performed by: INTERNAL MEDICINE

## 2020-09-02 VITALS — BODY MASS INDEX: 32.51 KG/M2 | HEIGHT: 78 IN

## 2020-09-02 LAB
GLUCOSE SERPL-MCNC: 132 MG/DL (ref 60–140)
HDLC SERPL-MCNC: 43 MG/DL
POC CHOLESTEROL, LDL (DOCK): 103 MG/DL
POC CHOLESTEROL, TOTAL: 183 MG/DL
TRIGL SERPL-MCNC: 186 MG/DL

## 2020-09-04 ENCOUNTER — PATIENT OUTREACH (OUTPATIENT)
Dept: ADMINISTRATIVE | Facility: OTHER | Age: 60
End: 2020-09-04

## 2020-09-04 NOTE — PROGRESS NOTES
Care Everywhere:   Immunization: updated  Health Maintenance: updated  Media Review:   Legacy Review:   Order placed:   Upcoming appts:

## 2020-09-08 ENCOUNTER — OFFICE VISIT (OUTPATIENT)
Dept: SPORTS MEDICINE | Facility: CLINIC | Age: 60
End: 2020-09-08
Payer: COMMERCIAL

## 2020-09-08 ENCOUNTER — HOSPITAL ENCOUNTER (OUTPATIENT)
Dept: RADIOLOGY | Facility: HOSPITAL | Age: 60
Discharge: HOME | End: 2020-09-08
Attending: PHYSICIAN ASSISTANT
Payer: COMMERCIAL

## 2020-09-08 VITALS
WEIGHT: 291 LBS | HEIGHT: 78 IN | SYSTOLIC BLOOD PRESSURE: 111 MMHG | HEART RATE: 69 BPM | BODY MASS INDEX: 33.67 KG/M2 | DIASTOLIC BLOOD PRESSURE: 74 MMHG

## 2020-09-08 DIAGNOSIS — M17.0 PRIMARY OSTEOARTHRITIS OF KNEES, BILATERAL: ICD-10-CM

## 2020-09-08 DIAGNOSIS — M25.562 LEFT KNEE PAIN, UNSPECIFIED CHRONICITY: ICD-10-CM

## 2020-09-08 DIAGNOSIS — M25.562 LEFT KNEE PAIN, UNSPECIFIED CHRONICITY: Primary | ICD-10-CM

## 2020-09-08 PROCEDURE — 99999 PR PBB SHADOW E&M-EST. PATIENT-LVL IV: CPT | Mod: PBBFAC,,, | Performed by: PHYSICIAN ASSISTANT

## 2020-09-08 PROCEDURE — 20610 DRAIN/INJ JOINT/BURSA W/O US: CPT | Mod: LT,S$GLB,, | Performed by: PHYSICIAN ASSISTANT

## 2020-09-08 PROCEDURE — 3008F BODY MASS INDEX DOCD: CPT | Mod: CPTII,S$GLB,, | Performed by: PHYSICIAN ASSISTANT

## 2020-09-08 PROCEDURE — 20610 LARGE JOINT ASPIRATION/INJECTION: L KNEE: ICD-10-PCS | Mod: LT,S$GLB,, | Performed by: PHYSICIAN ASSISTANT

## 2020-09-08 PROCEDURE — 73564 X-RAY EXAM KNEE 4 OR MORE: CPT | Mod: TC,50

## 2020-09-08 PROCEDURE — 99213 PR OFFICE/OUTPT VISIT, EST, LEVL III, 20-29 MIN: ICD-10-PCS | Mod: 25,S$GLB,, | Performed by: PHYSICIAN ASSISTANT

## 2020-09-08 PROCEDURE — 73564 X-RAY EXAM KNEE 4 OR MORE: CPT | Mod: 26,,, | Performed by: RADIOLOGY

## 2020-09-08 PROCEDURE — 73564 XR KNEE ORTHO BILAT WITH FLEXION: ICD-10-PCS | Mod: 26,,, | Performed by: RADIOLOGY

## 2020-09-08 PROCEDURE — 99213 OFFICE O/P EST LOW 20 MIN: CPT | Mod: 25,S$GLB,, | Performed by: PHYSICIAN ASSISTANT

## 2020-09-08 PROCEDURE — 3008F PR BODY MASS INDEX (BMI) DOCUMENTED: ICD-10-PCS | Mod: CPTII,S$GLB,, | Performed by: PHYSICIAN ASSISTANT

## 2020-09-08 PROCEDURE — 99999 PR PBB SHADOW E&M-EST. PATIENT-LVL IV: ICD-10-PCS | Mod: PBBFAC,,, | Performed by: PHYSICIAN ASSISTANT

## 2020-09-08 RX ORDER — TRIAMCINOLONE ACETONIDE 40 MG/ML
40 INJECTION, SUSPENSION INTRA-ARTICULAR; INTRAMUSCULAR
Status: DISCONTINUED | OUTPATIENT
Start: 2020-09-08 | End: 2020-09-08 | Stop reason: HOSPADM

## 2020-09-08 RX ORDER — MELOXICAM 15 MG/1
15 TABLET ORAL DAILY
Qty: 30 TABLET | Refills: 2 | Status: SHIPPED | OUTPATIENT
Start: 2020-09-08 | End: 2021-06-15 | Stop reason: ALTCHOICE

## 2020-09-08 RX ADMIN — TRIAMCINOLONE ACETONIDE 40 MG: 40 INJECTION, SUSPENSION INTRA-ARTICULAR; INTRAMUSCULAR at 11:09

## 2020-09-08 NOTE — PROGRESS NOTES
CC: Left knee pain    60 y.o. Male with a history of left knee pain who for evaluation.  Patient states that his left knee began bothering approximately 1 week ago.  No specific mechanism of injury or trauma to attribute to his systems.  Patient describes the pain as a constant dull ache that is diffuse throughout the knee, but localizes to the medial aspect of the left knee.  Denies any associated instability, but does state that he occasionally feels a mechanical clicking when bending the knee.  Thus far treatment has included ibuprofen 800 mg with little relief.  He has also been wearing a 3D knee sleeve for compression and states that this has slightly helped.  Denies any associated swelling.  He has a history of 3 arthroscopic procedures on his left knee many years ago.  He has received a right knee steroid injection last year which he states is still giving him relief.  He would like to attempt a left knee steroid injection today.    He reports that the pain and weakness. It also bothers him at night.    + mechanical symptoms, - instability    Is affecting ADLs.  Pain is 5/10 at it's worst.    REVIEW OF SYSTEMS:  Constitution: Negative. Negative for chills, fever and night sweats.   HENT: Negative for congestion and headaches.    Eyes: Negative for blurred vision, left vision loss and right vision loss.   Cardiovascular: Negative for chest pain and syncope.   Respiratory: Negative for cough and shortness of breath.    Endocrine: Negative for polydipsia, polyphagia and polyuria.   Hematologic/Lymphatic: Negative for bleeding problem. Does not bruise/bleed easily.   Skin: Negative for dry skin, itching and rash.   Musculoskeletal: Negative for falls. Positive for left knee pain and  muscle weakness.   Gastrointestinal: Negative for abdominal pain and bowel incontinence.   Genitourinary: Negative for bladder incontinence and nocturia.   Neurological: Negative for disturbances in coordination, loss of balance and  seizures.   Psychiatric/Behavioral: Negative for depression. The patient does not have insomnia.    Allergic/Immunologic: Negative for hives and persistent infections.     PAST MEDICAL HISTORY:    Past Medical History:   Diagnosis Date    Chronic urticaria 11/12/2014    Colonic polyp     Malignant neoplasm of thyroid gland     thyroid    Obesity     Post-surgical hypothyroidism     PUD (peptic ulcer disease)     Thyroid cancer     Thyroid cancer     Urticaria        PAST SURGICAL HISTORY:   Past Surgical History:   Procedure Laterality Date    COLONOSCOPY N/A 2/5/2018    Procedure: COLONOSCOPY;  Surgeon: ABHISHEK Hitchcock MD;  Location: [facility] ([address] [place] [address]);  Service: Endoscopy;  Laterality: N/A;  confirmed appt.    KNEE SURGERY  8/2013    right knee-includes scope    THYROIDECTOMY         FAMILY HISTORY:   Family History   Problem Relation Age of Onset    Breast cancer Mother     Cancer Mother         breast    Breast cancer Sister     Cancer Sister         breast    Heart disease Father 64        MI    Diabetes Neg Hx     Thyroid cancer Neg Hx        SOCIAL HISTORY:   Social History     Socioeconomic History    Marital status:      Spouse name: Aubrie    Number of children: 2    Years of education: Not on file    Highest education level: Not on file   Occupational History    Not on file   Social Needs    Financial resource strain: Not hard at all    Food insecurity     Worry: Never true     Inability: Never true    Transportation needs     Medical: No     Non-medical: No   Tobacco Use    Smoking status: Never Smoker    Smokeless tobacco: Never Used   Substance and Sexual Activity    Alcohol use: No     Frequency: Monthly or less     Drinks per session: 1 or 2     Binge frequency: Less than monthly     Comment: rarely     Drug use: No    Sexual activity: Yes     Partners: Female   Lifestyle    Physical activity     Days per week: 6 days     Minutes per session: 40 min     "Stress: Only a little   Relationships    Social connections     Talks on phone: More than three times a week     Gets together: Twice a week     Attends Mosque service: Not on file     Active member of club or organization: No     Attends meetings of clubs or organizations: Never     Relationship status:    Other Topics Concern    Not on file   Social History Narrative    RM x 3, 2 kids,  [place] [employer], played [employer], [employer], [employer], etc. [employer] for college. Born in [facility]       MEDICATIONS:     Current Outpatient Medications:     ascorbic acid, vitamin C, (VITAMIN C) 500 MG tablet, Take 500 mg by mouth once daily., Disp: , Rfl:     calcium carbonate (OS-BISMARK) 600 mg calcium (1,500 mg) Tab, Take 600 mg by mouth once., Disp: , Rfl:     cholecalciferol, vitamin D3, (VITAMIN D3) 1,000 unit capsule, Take 1,000 Units by mouth once daily., Disp: , Rfl:     fish oil-omega-3 fatty acids 300-1,000 mg capsule, Take by mouth once daily., Disp: , Rfl:     Lactobacillus rhamnosus GG (CULTURELLE) 10 billion cell capsule, Take 1 capsule by mouth once daily., Disp: , Rfl:     multivitamin (THERAGRAN) per tablet, Take 1 tablet by mouth once daily., Disp: , Rfl:     pomegranate xt/pomegranat seed (POMEGRANATE ORAL), Take by mouth., Disp: , Rfl:     pumpkin seed extract-soy germ 300 mg Cap, Take by mouth., Disp: , Rfl:     SYNTHROID 150 mcg tablet, Take 1 tablet (150 mcg total) by mouth before breakfast., Disp: 90 tablet, Rfl: 4    tamsulosin (FLOMAX) 0.4 mg Cap, Take 1 capsule (0.4 mg total) by mouth once daily., Disp: 90 capsule, Rfl: 3    meloxicam (MOBIC) 15 MG tablet, Take 1 tablet (15 mg total) by mouth once daily., Disp: 30 tablet, Rfl: 2    ALLERGIES:   Review of patient's allergies indicates:   Allergen Reactions    Shellfish containing products Hives       VITAL SIGNS:   /74   Pulse 69   Ht 6' 6" (1.981 m)   Wt 132 kg (291 lb)   BMI 33.63 kg/m²      PHYSICAL EXAMINATION  General:  " The patient is alert and oriented x 3.  Mood is pleasant.  Observation of ears, eyes and nose reveal no gross abnormalities.  No labored breathing observed.    LEFT KNEE EXAMINATION     OBSERVATION / INSPECTION   Gait:   Nonantalgic   Alignment:  Neutral   Scars:   None   Muscle atrophy: None  Effusion:  None   Warmth:  None   Discoloration:   none     TENDERNESS / CREPITUS (T / C):          T / C      T / C   Patella   - / -   Lateral joint line   - / -   Peripatellar medial  -  Medial joint line    + / -   Peripatellar lateral -  Medial plica   - / -   Patellar tendon -   Popliteal fossa   - / -   Quad tendon   -   Gastrocnemius   -   Prepatellar Bursa - / -   Quadricep   -   Tibial tubercle  -  Thigh/hamstring  -   Pes anserine/HS -  Fibula    -   ITB   - / -  Tibia     -   Tib/fib joint  - / -  LCL    -     MFC   - / -   MCL: Proximal  -    LFC   - / -    Distal   +          ROM: (* = pain)  PASSIVE   ACTIVE    Left :   5 / 0 / 120*   5 / 0 / 110*    Right :    5 / 0 / 145   5 / 0 / 145    Patellofemoral examination:  See above noted areas of tenderness.   Patella position    Subluxation / dislocation: Centered           Sup. / Inf;   Normal   Crepitus (PF):    Present  Patellar Mobility:       Medial-lateral:   Normal    Superior-inferior:  Normal    Inferior tilt   Normal    Patellar tendon:  Normal   Lateral tilt:    Normal   J-sign:     None   Patellofemoral grind:   No pain       MENISCAL SIGNS:     Pain on terminal extension:  +  Pain on terminal flexion:  +  Corys maneuver:  + (for pain medially)  Squat     deferred    LIGAMENT EXAMINATION:  ACL / Lachman:  normal (-1 to 2mm)    PCL-Post.  drawer: normal 0 to 2mm  MCL- Valgus:  normal 0 to 2mm  LCL- Varus:  normal 0 to 2mm  Pivot shift: normal (Equal)   Dial Test: difference c/w other side   At 30° flexion: normal (< 5°)    At 90° flexion: normal (< 5°)   Reverse Pivot Shift:   normal (Equal)     STRENGTH: (* = with pain) PAINFUL  SIDE   Quadricep   5/5   Hamstrin/5    EXTREMITY NEURO-VASCULAR EXAMINATION:   Sensation:  Grossly intact to light touch all dermatomal regions.   Motor Function:  Fully intact motor function at hip, knee, foot and ankle    DTRs;  quadriceps and  achilles 2+.  No clonus and downgoing Babinski.    Vascular status:  DP and PT pulses 2+, brisk capillary refill, symmetric.     OTHER FINDINGS:  N/A    X-rays bilateral knees (2020):   FINDINGS:  No acute fracture or dislocation seen.  No significant soft tissue edema or suprapatellar joint effusion.     Moderate tricompartmental osteophyte formation with mild to moderate joint space narrowing, particularly involving the medial tibiofemoral compartment bilaterally.          ASSESSMENT:    Left Knee Pain  Primary osteoarthritis of bilateral knees    PLAN:   1.  X-rays of bilateral knees obtained today in clinic and reviewed with patient in detail.  2.  Left knee CSI performed today in clinic.  See procedure note for details.  3.  Mobic 15 mg 1 p.o. q.d. prescribed patient's pharmacy for pickup.  Inform patient to refrain from taking other anti-inflammatories while taking Mobic.  May safely alternate with Tylenol.  4.  Discussed other conservative management options such as brace wear, ice/heat, activity modification, home exercise programs, and viscosupplementation injections.  5.  Patient will let us know if his pain worsens acutely, otherwise he will let us know when he would like to proceed with Euflexxa injections and we will place orders at that time.    All questions were answered, pt will contact us for questions or concerns in the interim.        Medical Dictation software was used during the dictation of portions or the entirety of this medical record.  Phonetic or grammatic errors may exist due to the use of this software. For clarification, refer to the author of the document.

## 2020-09-08 NOTE — PROCEDURES
Large Joint Aspiration/Injection: L knee    Date/Time: 9/8/2020 11:30 AM  Performed by: Reji Woody PA-C  Authorized by: Reji Woody PA-C     Consent Done?:  Yes (Verbal)  Indications:  Pain  Site marked: the procedure site was marked    Timeout: prior to procedure the correct patient, procedure, and site was verified    Prep: patient was prepped and draped in usual sterile fashion      Local anesthesia used?: Yes    Local anesthetic:  Topical anesthetic    Details:  Needle Size:  22 G  Ultrasonic Guidance for needle placement?: No    Approach:  Superior  Location:  Knee  Site:  L knee  Medications:  40 mg triamcinolone acetonide 40 mg/mL  Patient tolerance:  Patient tolerated the procedure well with no immediate complications    Injection Procedure  A time out was performed, including verification of patient ID, procedure, site and side, availability of information and equipment, review of safety issues, and agreement with consent, the procedure site was marked.    After time out was performed, the patient was prepped aseptically with povidone-iodine swabsticks. A diagnostic and therapeutic injection of 1:3cc Kenalog/Marcaine was given under sterile technique using a 22g x 1.5 needle from the Superolateral  aspect of the left Knee Joint in the supine position.      Zaheer Neal had no adverse reactions to the medication. Pain decreased. He was instructed to apply ice to the joint for 20 minutes and avoid strenuous activities for 24-36 hours following the injection. He was warned of possible blood sugar and/or blood pressure changes during that time. Following that time, he can resume regular activities.    He was reminded to call the clinic immediately for any adverse side effects as explained in clinic today.    Exp:  3/31/2022  Lot:  [id number]

## 2020-09-16 ENCOUNTER — TELEPHONE (OUTPATIENT)
Dept: SPORTS MEDICINE | Facility: CLINIC | Age: 60
End: 2020-09-16

## 2020-09-16 DIAGNOSIS — M25.562 LEFT KNEE PAIN, UNSPECIFIED CHRONICITY: Primary | ICD-10-CM

## 2020-09-16 NOTE — TELEPHONE ENCOUNTER
----- Message from Haseeb Young MA sent at 9/16/2020 12:43 PM CDT -----  Regarding: Euflexxa inj  Pt requesting Euflexxa- Left Knee.    CSI in left knee 9/8 & Discussed Euflexxa for future.     Please place order and let me know how far out to schedule.    Thanks

## 2020-10-05 ENCOUNTER — TELEPHONE (OUTPATIENT)
Dept: FAMILY MEDICINE | Facility: CLINIC | Age: 60
End: 2020-10-05

## 2020-10-05 ENCOUNTER — CLINICAL SUPPORT (OUTPATIENT)
Dept: FAMILY MEDICINE | Facility: CLINIC | Age: 60
End: 2020-10-05
Payer: COMMERCIAL

## 2020-10-05 DIAGNOSIS — Z00.00 ROUTINE GENERAL MEDICAL EXAMINATION AT A HEALTH CARE FACILITY: ICD-10-CM

## 2020-10-05 PROCEDURE — 80305 DRUG TEST PRSMV DIR OPT OBS: CPT | Mod: S$GLB,,, | Performed by: FAMILY MEDICINE

## 2020-10-05 PROCEDURE — 80305 PR NON-DOT DRUG SCREENS: ICD-10-PCS | Mod: S$GLB,,, | Performed by: FAMILY MEDICINE

## 2020-10-05 PROCEDURE — 99201 PR OFFICE/OUTPT VISIT,NEW,LEVL I: CPT | Mod: S$GLB,,, | Performed by: FAMILY MEDICINE

## 2020-10-05 PROCEDURE — 99201 PR OFFICE/OUTPT VISIT,NEW,LEVL I: ICD-10-PCS | Mod: S$GLB,,, | Performed by: FAMILY MEDICINE

## 2020-10-05 NOTE — TELEPHONE ENCOUNTER
Physical paperwork refaxed      ----- Message from Rebeca Gregg sent at 10/5/2020 11:53 AM CDT -----  Regarding: paperwork  Type:  Needs Medical Advice    Who Called: louise   Reason: pre employment physical  Doctor needs to check off if patient is suitable for position. Please refax paperwork  Would the patient rather a call back or a response via [facility]? fax  Best Call Back Number: [phone]  Additional Information: n/a

## 2020-10-05 NOTE — PROGRESS NOTES
Zaheer has presented today on behalf of [facility]. Zaheer Neal has completed Non-DOT Physical and Non-DOT Drug Screen .    Total charge: $115    Camilla Renner

## 2020-10-12 ENCOUNTER — PATIENT OUTREACH (OUTPATIENT)
Dept: ADMINISTRATIVE | Facility: OTHER | Age: 60
End: 2020-10-12

## 2020-10-12 NOTE — PROGRESS NOTES
Care Everywhere:   Immunization: updated(delay in links)   Health Maintenance: updated  Media Review:   Legacy Review:   Order placed:   Upcoming appts:

## 2020-10-13 ENCOUNTER — OFFICE VISIT (OUTPATIENT)
Dept: SPORTS MEDICINE | Facility: CLINIC | Age: 60
End: 2020-10-13
Payer: COMMERCIAL

## 2020-10-13 VITALS
WEIGHT: 289 LBS | HEART RATE: 64 BPM | DIASTOLIC BLOOD PRESSURE: 75 MMHG | SYSTOLIC BLOOD PRESSURE: 116 MMHG | BODY MASS INDEX: 33.44 KG/M2 | HEIGHT: 78 IN

## 2020-10-13 DIAGNOSIS — M17.12 PRIMARY OSTEOARTHRITIS OF LEFT KNEE: Primary | ICD-10-CM

## 2020-10-13 PROCEDURE — 99499 NO LOS: ICD-10-PCS | Mod: S$GLB,,, | Performed by: PHYSICIAN ASSISTANT

## 2020-10-13 PROCEDURE — 99499 UNLISTED E&M SERVICE: CPT | Mod: S$GLB,,, | Performed by: PHYSICIAN ASSISTANT

## 2020-10-13 PROCEDURE — 20610 DRAIN/INJ JOINT/BURSA W/O US: CPT | Mod: LT,S$GLB,, | Performed by: PHYSICIAN ASSISTANT

## 2020-10-13 PROCEDURE — 99999 PR PBB SHADOW E&M-EST. PATIENT-LVL III: CPT | Mod: PBBFAC,,, | Performed by: PHYSICIAN ASSISTANT

## 2020-10-13 PROCEDURE — 99999 PR PBB SHADOW E&M-EST. PATIENT-LVL III: ICD-10-PCS | Mod: PBBFAC,,, | Performed by: PHYSICIAN ASSISTANT

## 2020-10-13 PROCEDURE — 20610 LARGE JOINT ASPIRATION/INJECTION: L KNEE: ICD-10-PCS | Mod: LT,S$GLB,, | Performed by: PHYSICIAN ASSISTANT

## 2020-10-13 NOTE — PROGRESS NOTES
Patient is here for follow up of left knee arthritis. Pt is requesting Euflexxa injection #1.  PMFH reviewed per encounter record. Has failed other conservative modalities including NSAIDS, activity modification, weight loss.       The prior shots were tolerated well.     PHYSICAL EXAMINATION:      General: The patient is alert and oriented x 3. Mood is pleasant.   Observation of ears, eyes and nose reveals no gross abnormalities. No   labored breathing observed.      No signs of infection or adverse reaction to knee.    Euflexxa Injection Procedure #1 - left knee    A time out was performed, including verification of patient ID, procedure, site and side, availability of information and equipment, review of safety issues, and agreement with consent, the procedure site was marked.    The patient was prepped aseptically with povidone-iodine swabsticks. A diagnostic and therapeutic injection of 2cc Euflexxa was given under sterile technique using a 21.5g x 1.5 needle from the superolateral aspect of the left knee Joint in the supine position.   Zaheer Neal had no adverse reactions to the medication. Pain decreased. male was instructed to apply ice to the joint for 20 minutes and avoid strenuous activities for 24-36 hours following the injection. male was warned of possible blood pressure changes during that time. Following that time, male can resume activities as prior to the injection.    male was reminded to call the clinic immediately for any adverse side effects as explained in clinic today.    Exp:  6/7/2021  Lot:  [id number]

## 2020-10-13 NOTE — PROCEDURES
Large Joint Aspiration/Injection: L knee    Date/Time: 10/13/2020 11:00 AM  Performed by: Reji Woody PA-C  Authorized by: Reji Woody PA-C     Consent Done?:  Yes (Verbal)  Indications:  Pain  Site marked: the procedure site was marked    Timeout: prior to procedure the correct patient, procedure, and site was verified    Prep: patient was prepped and draped in usual sterile fashion    Local anesthesia used?: No      Details:  Needle Size:  22 G  Ultrasonic Guidance for needle placement?: No    Approach:  Superior  Location:  Knee  Site:  L knee  Medications:  20 mg sodium hyaluronate (EUFLEXXA) 10 mg/mL(mw 2.4 -3.6 million)  Patient tolerance:  Patient tolerated the procedure well with no immediate complications

## 2020-10-20 ENCOUNTER — OFFICE VISIT (OUTPATIENT)
Dept: SPORTS MEDICINE | Facility: CLINIC | Age: 60
End: 2020-10-20
Payer: COMMERCIAL

## 2020-10-20 VITALS
HEIGHT: 78 IN | SYSTOLIC BLOOD PRESSURE: 120 MMHG | HEART RATE: 70 BPM | DIASTOLIC BLOOD PRESSURE: 74 MMHG | BODY MASS INDEX: 33.42 KG/M2 | WEIGHT: 288.88 LBS

## 2020-10-20 DIAGNOSIS — M17.12 PRIMARY OSTEOARTHRITIS OF LEFT KNEE: Primary | ICD-10-CM

## 2020-10-20 PROCEDURE — 20610 DRAIN/INJ JOINT/BURSA W/O US: CPT | Mod: LT,S$GLB,, | Performed by: PHYSICIAN ASSISTANT

## 2020-10-20 PROCEDURE — 99499 NO LOS: ICD-10-PCS | Mod: S$GLB,,, | Performed by: PHYSICIAN ASSISTANT

## 2020-10-20 PROCEDURE — 99999 PR PBB SHADOW E&M-EST. PATIENT-LVL III: CPT | Mod: PBBFAC,,, | Performed by: PHYSICIAN ASSISTANT

## 2020-10-20 PROCEDURE — 99499 UNLISTED E&M SERVICE: CPT | Mod: S$GLB,,, | Performed by: PHYSICIAN ASSISTANT

## 2020-10-20 PROCEDURE — 20610 LARGE JOINT ASPIRATION/INJECTION: L KNEE: ICD-10-PCS | Mod: LT,S$GLB,, | Performed by: PHYSICIAN ASSISTANT

## 2020-10-20 PROCEDURE — 99999 PR PBB SHADOW E&M-EST. PATIENT-LVL III: ICD-10-PCS | Mod: PBBFAC,,, | Performed by: PHYSICIAN ASSISTANT

## 2020-10-20 NOTE — PROCEDURES
Large Joint Aspiration/Injection: L knee    Date/Time: 10/20/2020 3:30 PM  Performed by: Reji Woody PA-C  Authorized by: Reji Woody PA-C     Consent Done?:  Yes (Verbal)  Indications:  Pain  Site marked: the procedure site was marked    Timeout: prior to procedure the correct patient, procedure, and site was verified    Prep: patient was prepped and draped in usual sterile fashion    Local anesthesia used?: No      Details:  Needle Size:  22 G  Ultrasonic Guidance for needle placement?: No    Approach:  Superior  Location:  Knee  Site:  L knee  Medications:  20 mg sodium hyaluronate (EUFLEXXA) 10 mg/mL(mw 2.4 -3.6 million)  Patient tolerance:  Patient tolerated the procedure well with no immediate complications

## 2020-10-20 NOTE — PROGRESS NOTES
Patient is here for follow up of left knee arthritis. Pt is requesting Euflexxa injection #2.  PMFH reviewed per encounter record. Has failed other conservative modalities including NSAIDS, activity modification, weight loss.       The prior shots were tolerated well.     PHYSICAL EXAMINATION:      General: The patient is alert and oriented x 3. Mood is pleasant.   Observation of ears, eyes and nose reveals no gross abnormalities. No   labored breathing observed.      No signs of infection or adverse reaction to knee.    Euflexxa Injection Procedure #2 - left knee    A time out was performed, including verification of patient ID, procedure, site and side, availability of information and equipment, review of safety issues, and agreement with consent, the procedure site was marked.    The patient was prepped aseptically with povidone-iodine swabsticks. A diagnostic and therapeutic injection of 2cc Euflexxa was given under sterile technique using a 21.5g x 1.5 needle from the superolateral aspect of the left knee Joint in the supine position.   Zaheer Neal had no adverse reactions to the medication. Pain decreased. male was instructed to apply ice to the joint for 20 minutes and avoid strenuous activities for 24-36 hours following the injection. male was warned of possible blood pressure changes during that time. Following that time, male can resume activities as prior to the injection.    male was reminded to call the clinic immediately for any adverse side effects as explained in clinic today.    Exp:  8/17/2021   Lot:  [id number]

## 2020-10-25 ENCOUNTER — PATIENT MESSAGE (OUTPATIENT)
Dept: SPORTS MEDICINE | Facility: CLINIC | Age: 60
End: 2020-10-25

## 2020-10-27 ENCOUNTER — OFFICE VISIT (OUTPATIENT)
Dept: SPORTS MEDICINE | Facility: CLINIC | Age: 60
End: 2020-10-27
Payer: COMMERCIAL

## 2020-10-27 VITALS
SYSTOLIC BLOOD PRESSURE: 118 MMHG | BODY MASS INDEX: 33.28 KG/M2 | WEIGHT: 287.63 LBS | DIASTOLIC BLOOD PRESSURE: 75 MMHG | HEIGHT: 78 IN | HEART RATE: 64 BPM

## 2020-10-27 DIAGNOSIS — M17.12 PRIMARY OSTEOARTHRITIS OF LEFT KNEE: Primary | ICD-10-CM

## 2020-10-27 PROCEDURE — 99999 PR PBB SHADOW E&M-EST. PATIENT-LVL III: ICD-10-PCS | Mod: PBBFAC,,, | Performed by: PHYSICIAN ASSISTANT

## 2020-10-27 PROCEDURE — 20610 DRAIN/INJ JOINT/BURSA W/O US: CPT | Mod: LT,S$GLB,, | Performed by: PHYSICIAN ASSISTANT

## 2020-10-27 PROCEDURE — 99999 PR PBB SHADOW E&M-EST. PATIENT-LVL III: CPT | Mod: PBBFAC,,, | Performed by: PHYSICIAN ASSISTANT

## 2020-10-27 PROCEDURE — 99499 NO LOS: ICD-10-PCS | Mod: S$GLB,,, | Performed by: PHYSICIAN ASSISTANT

## 2020-10-27 PROCEDURE — 99499 UNLISTED E&M SERVICE: CPT | Mod: S$GLB,,, | Performed by: PHYSICIAN ASSISTANT

## 2020-10-27 PROCEDURE — 20610 LARGE JOINT ASPIRATION/INJECTION: L KNEE: ICD-10-PCS | Mod: LT,S$GLB,, | Performed by: PHYSICIAN ASSISTANT

## 2020-10-27 NOTE — PROGRESS NOTES
Patient is here for follow up of left knee arthritis. Pt is requesting Euflexxa injection #3.  PMFH reviewed per encounter record. Has failed other conservative modalities including NSAIDS, activity modification, weight loss.       The prior shots were tolerated well.     PHYSICAL EXAMINATION:      General: The patient is alert and oriented x 3. Mood is pleasant.   Observation of ears, eyes and nose reveals no gross abnormalities. No   labored breathing observed.      No signs of infection or adverse reaction to knee.    Euflexxa Injection Procedure #3 - left knee    A time out was performed, including verification of patient ID, procedure, site and side, availability of information and equipment, review of safety issues, and agreement with consent, the procedure site was marked.    The patient was prepped aseptically with povidone-iodine swabsticks. A diagnostic and therapeutic injection of 2cc Euflexxa was given under sterile technique using a 21.5g x 1.5 needle from the superolateral aspect of the left knee Joint in the supine position.   Zaheer Neal had no adverse reactions to the medication. Pain decreased. male was instructed to apply ice to the joint for 20 minutes and avoid strenuous activities for 24-36 hours following the injection. male was warned of possible blood pressure changes during that time. Following that time, male can resume activities as prior to the injection.    male was reminded to call the clinic immediately for any adverse side effects as explained in clinic today.    Exp:  8/17/2021  Lot:  [id number]

## 2020-10-27 NOTE — PROCEDURES
Large Joint Aspiration/Injection: L knee    Date/Time: 10/27/2020 2:30 PM  Performed by: Reji Woody PA-C  Authorized by: Reji Woody PA-C     Consent Done?:  Yes (Verbal)  Indications:  Pain  Site marked: the procedure site was marked    Timeout: prior to procedure the correct patient, procedure, and site was verified    Prep: patient was prepped and draped in usual sterile fashion      Details:  Needle Size:  22 G  Ultrasonic Guidance for needle placement?: No    Approach:  Superior  Location:  Knee  Site:  L knee  Medications:  20 mg sodium hyaluronate (EUFLEXXA) 10 mg/mL(mw 2.4 -3.6 million)  Patient tolerance:  Patient tolerated the procedure well with no immediate complications

## 2021-01-19 ENCOUNTER — LAB VISIT (OUTPATIENT)
Dept: LAB | Facility: HOSPITAL | Age: 61
End: 2021-01-19
Attending: UROLOGY
Payer: COMMERCIAL

## 2021-01-19 DIAGNOSIS — R97.20 ELEVATED PSA: ICD-10-CM

## 2021-01-19 LAB — COMPLEXED PSA SERPL-MCNC: 0.75 NG/ML (ref 0–4)

## 2021-01-19 PROCEDURE — 84153 ASSAY OF PSA TOTAL: CPT

## 2021-01-19 PROCEDURE — 36415 COLL VENOUS BLD VENIPUNCTURE: CPT | Mod: PO

## 2021-01-25 ENCOUNTER — OFFICE VISIT (OUTPATIENT)
Dept: UROLOGY | Facility: CLINIC | Age: 61
End: 2021-01-25
Payer: COMMERCIAL

## 2021-01-25 VITALS
DIASTOLIC BLOOD PRESSURE: 78 MMHG | WEIGHT: 287.69 LBS | HEART RATE: 66 BPM | SYSTOLIC BLOOD PRESSURE: 117 MMHG | HEIGHT: 78 IN | BODY MASS INDEX: 33.28 KG/M2

## 2021-01-25 DIAGNOSIS — R97.20 ELEVATED PSA: ICD-10-CM

## 2021-01-25 DIAGNOSIS — N40.1 BPH WITH OBSTRUCTION/LOWER URINARY TRACT SYMPTOMS: Primary | ICD-10-CM

## 2021-01-25 DIAGNOSIS — N13.8 BPH WITH OBSTRUCTION/LOWER URINARY TRACT SYMPTOMS: Primary | ICD-10-CM

## 2021-01-25 DIAGNOSIS — Z80.42 FAMILY HISTORY OF PROSTATE CANCER: ICD-10-CM

## 2021-01-25 PROCEDURE — 99213 OFFICE O/P EST LOW 20 MIN: CPT | Mod: S$GLB,,, | Performed by: PHYSICIAN ASSISTANT

## 2021-01-25 PROCEDURE — 99213 PR OFFICE/OUTPT VISIT, EST, LEVL III, 20-29 MIN: ICD-10-PCS | Mod: S$GLB,,, | Performed by: PHYSICIAN ASSISTANT

## 2021-01-25 PROCEDURE — 3008F PR BODY MASS INDEX (BMI) DOCUMENTED: ICD-10-PCS | Mod: CPTII,S$GLB,, | Performed by: PHYSICIAN ASSISTANT

## 2021-01-25 PROCEDURE — 99999 PR PBB SHADOW E&M-EST. PATIENT-LVL III: ICD-10-PCS | Mod: PBBFAC,,, | Performed by: PHYSICIAN ASSISTANT

## 2021-01-25 PROCEDURE — 3008F BODY MASS INDEX DOCD: CPT | Mod: CPTII,S$GLB,, | Performed by: PHYSICIAN ASSISTANT

## 2021-01-25 PROCEDURE — 99999 PR PBB SHADOW E&M-EST. PATIENT-LVL III: CPT | Mod: PBBFAC,,, | Performed by: PHYSICIAN ASSISTANT

## 2021-06-10 DIAGNOSIS — R35.1 BENIGN PROSTATIC HYPERPLASIA WITH NOCTURIA: ICD-10-CM

## 2021-06-10 DIAGNOSIS — N40.1 BENIGN PROSTATIC HYPERPLASIA WITH NOCTURIA: ICD-10-CM

## 2021-06-11 RX ORDER — TAMSULOSIN HYDROCHLORIDE 0.4 MG/1
0.4 CAPSULE ORAL DAILY
Qty: 90 CAPSULE | Refills: 0 | Status: SHIPPED | OUTPATIENT
Start: 2021-06-11 | End: 2021-06-15

## 2021-06-15 ENCOUNTER — OFFICE VISIT (OUTPATIENT)
Dept: INTERNAL MEDICINE | Facility: CLINIC | Age: 61
End: 2021-06-15
Attending: FAMILY MEDICINE
Payer: COMMERCIAL

## 2021-06-15 ENCOUNTER — LAB VISIT (OUTPATIENT)
Dept: LAB | Facility: HOSPITAL | Age: 61
End: 2021-06-15
Attending: FAMILY MEDICINE
Payer: COMMERCIAL

## 2021-06-15 VITALS
DIASTOLIC BLOOD PRESSURE: 72 MMHG | HEIGHT: 78 IN | WEIGHT: 288 LBS | OXYGEN SATURATION: 99 % | BODY MASS INDEX: 33.32 KG/M2 | HEART RATE: 73 BPM | SYSTOLIC BLOOD PRESSURE: 118 MMHG

## 2021-06-15 DIAGNOSIS — E89.0 POST-SURGICAL HYPOTHYROIDISM: ICD-10-CM

## 2021-06-15 DIAGNOSIS — E03.9 HYPOTHYROIDISM (ACQUIRED): ICD-10-CM

## 2021-06-15 DIAGNOSIS — M17.0 OSTEOARTHRITIS OF BOTH KNEES, UNSPECIFIED OSTEOARTHRITIS TYPE: ICD-10-CM

## 2021-06-15 DIAGNOSIS — R35.1 BENIGN PROSTATIC HYPERPLASIA WITH NOCTURIA: ICD-10-CM

## 2021-06-15 DIAGNOSIS — N40.1 BENIGN PROSTATIC HYPERPLASIA WITH NOCTURIA: ICD-10-CM

## 2021-06-15 DIAGNOSIS — R10.32 LEFT LOWER QUADRANT ABDOMINAL PAIN: ICD-10-CM

## 2021-06-15 DIAGNOSIS — Z00.00 ANNUAL PHYSICAL EXAM: Primary | ICD-10-CM

## 2021-06-15 DIAGNOSIS — Z00.00 ANNUAL PHYSICAL EXAM: ICD-10-CM

## 2021-06-15 LAB
ALBUMIN SERPL BCP-MCNC: 4 G/DL (ref 3.5–5.2)
ALP SERPL-CCNC: 74 U/L (ref 55–135)
ALT SERPL W/O P-5'-P-CCNC: 31 U/L (ref 10–44)
ANION GAP SERPL CALC-SCNC: 8 MMOL/L (ref 8–16)
AST SERPL-CCNC: 34 U/L (ref 10–40)
BILIRUB SERPL-MCNC: 0.6 MG/DL (ref 0.1–1)
BUN SERPL-MCNC: 11 MG/DL (ref 8–23)
CALCIUM SERPL-MCNC: 9.7 MG/DL (ref 8.7–10.5)
CHLORIDE SERPL-SCNC: 105 MMOL/L (ref 95–110)
CHOLEST SERPL-MCNC: 172 MG/DL (ref 120–199)
CHOLEST/HDLC SERPL: 4.8 {RATIO} (ref 2–5)
CO2 SERPL-SCNC: 27 MMOL/L (ref 23–29)
CREAT SERPL-MCNC: 1.2 MG/DL (ref 0.5–1.4)
EST. GFR  (AFRICAN AMERICAN): >60 ML/MIN/1.73 M^2
EST. GFR  (NON AFRICAN AMERICAN): >60 ML/MIN/1.73 M^2
GLUCOSE SERPL-MCNC: 106 MG/DL (ref 70–110)
HDLC SERPL-MCNC: 36 MG/DL (ref 40–75)
HDLC SERPL: 20.9 % (ref 20–50)
LDLC SERPL CALC-MCNC: 103.8 MG/DL (ref 63–159)
NONHDLC SERPL-MCNC: 136 MG/DL
POTASSIUM SERPL-SCNC: 4.8 MMOL/L (ref 3.5–5.1)
PROT SERPL-MCNC: 7.6 G/DL (ref 6–8.4)
SODIUM SERPL-SCNC: 140 MMOL/L (ref 136–145)
T4 FREE SERPL-MCNC: 1.09 NG/DL (ref 0.71–1.51)
TRIGL SERPL-MCNC: 161 MG/DL (ref 30–150)
TSH SERPL DL<=0.005 MIU/L-ACNC: 0.36 UIU/ML (ref 0.4–4)

## 2021-06-15 PROCEDURE — 84439 ASSAY OF FREE THYROXINE: CPT | Performed by: FAMILY MEDICINE

## 2021-06-15 PROCEDURE — 1125F AMNT PAIN NOTED PAIN PRSNT: CPT | Mod: S$GLB,,, | Performed by: FAMILY MEDICINE

## 2021-06-15 PROCEDURE — 80053 COMPREHEN METABOLIC PANEL: CPT | Performed by: FAMILY MEDICINE

## 2021-06-15 PROCEDURE — 84443 ASSAY THYROID STIM HORMONE: CPT | Performed by: FAMILY MEDICINE

## 2021-06-15 PROCEDURE — 99999 PR PBB SHADOW E&M-EST. PATIENT-LVL IV: CPT | Mod: PBBFAC,,, | Performed by: FAMILY MEDICINE

## 2021-06-15 PROCEDURE — 99396 PREV VISIT EST AGE 40-64: CPT | Mod: S$GLB,,, | Performed by: FAMILY MEDICINE

## 2021-06-15 PROCEDURE — 80061 LIPID PANEL: CPT | Performed by: FAMILY MEDICINE

## 2021-06-15 PROCEDURE — 99999 PR PBB SHADOW E&M-EST. PATIENT-LVL IV: ICD-10-PCS | Mod: PBBFAC,,, | Performed by: FAMILY MEDICINE

## 2021-06-15 PROCEDURE — 99396 PR PREVENTIVE VISIT,EST,40-64: ICD-10-PCS | Mod: S$GLB,,, | Performed by: FAMILY MEDICINE

## 2021-06-15 PROCEDURE — 3008F BODY MASS INDEX DOCD: CPT | Mod: CPTII,S$GLB,, | Performed by: FAMILY MEDICINE

## 2021-06-15 PROCEDURE — 3008F PR BODY MASS INDEX (BMI) DOCUMENTED: ICD-10-PCS | Mod: CPTII,S$GLB,, | Performed by: FAMILY MEDICINE

## 2021-06-15 PROCEDURE — 1125F PR PAIN SEVERITY QUANTIFIED, PAIN PRESENT: ICD-10-PCS | Mod: S$GLB,,, | Performed by: FAMILY MEDICINE

## 2021-06-15 PROCEDURE — 84432 ASSAY OF THYROGLOBULIN: CPT | Performed by: FAMILY MEDICINE

## 2021-06-15 RX ORDER — TAMSULOSIN HYDROCHLORIDE 0.4 MG/1
0.4 CAPSULE ORAL DAILY
Qty: 90 CAPSULE | Refills: 3 | Status: SHIPPED | OUTPATIENT
Start: 2021-06-15 | End: 2022-07-08

## 2021-06-23 ENCOUNTER — HOSPITAL ENCOUNTER (OUTPATIENT)
Dept: RADIOLOGY | Facility: HOSPITAL | Age: 61
Discharge: HOME | End: 2021-06-23
Attending: FAMILY MEDICINE
Payer: COMMERCIAL

## 2021-06-23 DIAGNOSIS — R10.32 LEFT LOWER QUADRANT ABDOMINAL PAIN: ICD-10-CM

## 2021-06-23 PROCEDURE — 76700 US EXAM ABDOM COMPLETE: CPT | Mod: TC,PO

## 2021-06-25 DIAGNOSIS — E89.0 POST-SURGICAL HYPOTHYROIDISM: ICD-10-CM

## 2021-06-28 RX ORDER — LEVOTHYROXINE SODIUM 150 MCG
150 TABLET ORAL
Qty: 90 TABLET | Refills: 0 | Status: SHIPPED | OUTPATIENT
Start: 2021-06-28 | End: 2021-07-28 | Stop reason: SDUPTHER

## 2021-07-07 ENCOUNTER — TELEPHONE (OUTPATIENT)
Dept: INTERNAL MEDICINE | Facility: CLINIC | Age: 61
End: 2021-07-07

## 2021-07-16 ENCOUNTER — LAB VISIT (OUTPATIENT)
Dept: LAB | Facility: HOSPITAL | Age: 61
End: 2021-07-16
Payer: COMMERCIAL

## 2021-07-16 DIAGNOSIS — N13.8 BPH WITH OBSTRUCTION/LOWER URINARY TRACT SYMPTOMS: ICD-10-CM

## 2021-07-16 DIAGNOSIS — N40.1 BPH WITH OBSTRUCTION/LOWER URINARY TRACT SYMPTOMS: ICD-10-CM

## 2021-07-16 DIAGNOSIS — R97.20 ELEVATED PSA: ICD-10-CM

## 2021-07-16 LAB — COMPLEXED PSA SERPL-MCNC: 0.77 NG/ML (ref 0–4)

## 2021-07-16 PROCEDURE — 36415 COLL VENOUS BLD VENIPUNCTURE: CPT | Mod: PO | Performed by: PHYSICIAN ASSISTANT

## 2021-07-16 PROCEDURE — 84153 ASSAY OF PSA TOTAL: CPT | Performed by: PHYSICIAN ASSISTANT

## 2021-07-19 DIAGNOSIS — N13.8 BPH WITH OBSTRUCTION/LOWER URINARY TRACT SYMPTOMS: Primary | ICD-10-CM

## 2021-07-19 DIAGNOSIS — N40.1 BPH WITH OBSTRUCTION/LOWER URINARY TRACT SYMPTOMS: Primary | ICD-10-CM

## 2021-07-28 ENCOUNTER — PATIENT OUTREACH (OUTPATIENT)
Dept: ADMINISTRATIVE | Facility: OTHER | Age: 61
End: 2021-07-28

## 2021-07-28 ENCOUNTER — OFFICE VISIT (OUTPATIENT)
Dept: ENDOCRINOLOGY | Facility: CLINIC | Age: 61
End: 2021-07-28
Payer: COMMERCIAL

## 2021-07-28 VITALS — DIASTOLIC BLOOD PRESSURE: 72 MMHG | SYSTOLIC BLOOD PRESSURE: 116 MMHG

## 2021-07-28 DIAGNOSIS — E03.9 HYPOTHYROIDISM (ACQUIRED): Primary | ICD-10-CM

## 2021-07-28 DIAGNOSIS — C73 MALIGNANT NEOPLASM OF THYROID GLAND: ICD-10-CM

## 2021-07-28 DIAGNOSIS — E66.9 OBESITY (BMI 30.0-34.9): ICD-10-CM

## 2021-07-28 DIAGNOSIS — E89.0 POST-SURGICAL HYPOTHYROIDISM: ICD-10-CM

## 2021-07-28 PROCEDURE — 99999 PR PBB SHADOW E&M-EST. PATIENT-LVL III: ICD-10-PCS | Mod: PBBFAC,,, | Performed by: NURSE PRACTITIONER

## 2021-07-28 PROCEDURE — 1160F RVW MEDS BY RX/DR IN RCRD: CPT | Mod: CPTII,S$GLB,, | Performed by: NURSE PRACTITIONER

## 2021-07-28 PROCEDURE — 99214 PR OFFICE/OUTPT VISIT, EST, LEVL IV, 30-39 MIN: ICD-10-PCS | Mod: S$GLB,,, | Performed by: NURSE PRACTITIONER

## 2021-07-28 PROCEDURE — 1159F PR MEDICATION LIST DOCUMENTED IN MEDICAL RECORD: ICD-10-PCS | Mod: CPTII,S$GLB,, | Performed by: NURSE PRACTITIONER

## 2021-07-28 PROCEDURE — 1160F PR REVIEW ALL MEDS BY PRESCRIBER/CLIN PHARMACIST DOCUMENTED: ICD-10-PCS | Mod: CPTII,S$GLB,, | Performed by: NURSE PRACTITIONER

## 2021-07-28 PROCEDURE — 99214 OFFICE O/P EST MOD 30 MIN: CPT | Mod: S$GLB,,, | Performed by: NURSE PRACTITIONER

## 2021-07-28 PROCEDURE — 99999 PR PBB SHADOW E&M-EST. PATIENT-LVL III: CPT | Mod: PBBFAC,,, | Performed by: NURSE PRACTITIONER

## 2021-07-28 PROCEDURE — 1159F MED LIST DOCD IN RCRD: CPT | Mod: CPTII,S$GLB,, | Performed by: NURSE PRACTITIONER

## 2021-07-28 RX ORDER — LEVOTHYROXINE SODIUM 150 MCG
150 TABLET ORAL
Qty: 90 TABLET | Refills: 3 | Status: SHIPPED | OUTPATIENT
Start: 2021-07-28 | End: 2022-09-15

## 2021-10-01 ENCOUNTER — OFFICE VISIT (OUTPATIENT)
Dept: PODIATRY | Facility: CLINIC | Age: 61
End: 2021-10-01
Payer: COMMERCIAL

## 2021-10-01 VITALS
HEART RATE: 62 BPM | SYSTOLIC BLOOD PRESSURE: 114 MMHG | DIASTOLIC BLOOD PRESSURE: 70 MMHG | BODY MASS INDEX: 34.57 KG/M2 | WEIGHT: 299.19 LBS

## 2021-10-01 DIAGNOSIS — M21.41 PES PLANUS OF BOTH FEET: ICD-10-CM

## 2021-10-01 DIAGNOSIS — M79.671 FOOT PAIN, RIGHT: Primary | ICD-10-CM

## 2021-10-01 DIAGNOSIS — M21.42 PES PLANUS OF BOTH FEET: ICD-10-CM

## 2021-10-01 PROCEDURE — 99203 OFFICE O/P NEW LOW 30 MIN: CPT | Mod: S$GLB,,, | Performed by: PODIATRIST

## 2021-10-01 PROCEDURE — 1160F PR REVIEW ALL MEDS BY PRESCRIBER/CLIN PHARMACIST DOCUMENTED: ICD-10-PCS | Mod: CPTII,S$GLB,, | Performed by: PODIATRIST

## 2021-10-01 PROCEDURE — 99203 PR OFFICE/OUTPT VISIT, NEW, LEVL III, 30-44 MIN: ICD-10-PCS | Mod: S$GLB,,, | Performed by: PODIATRIST

## 2021-10-01 PROCEDURE — 3074F SYST BP LT 130 MM HG: CPT | Mod: CPTII,S$GLB,, | Performed by: PODIATRIST

## 2021-10-01 PROCEDURE — 1159F MED LIST DOCD IN RCRD: CPT | Mod: CPTII,S$GLB,, | Performed by: PODIATRIST

## 2021-10-01 PROCEDURE — 1160F RVW MEDS BY RX/DR IN RCRD: CPT | Mod: CPTII,S$GLB,, | Performed by: PODIATRIST

## 2021-10-01 PROCEDURE — 3008F PR BODY MASS INDEX (BMI) DOCUMENTED: ICD-10-PCS | Mod: CPTII,S$GLB,, | Performed by: PODIATRIST

## 2021-10-01 PROCEDURE — 3074F PR MOST RECENT SYSTOLIC BLOOD PRESSURE < 130 MM HG: ICD-10-PCS | Mod: CPTII,S$GLB,, | Performed by: PODIATRIST

## 2021-10-01 PROCEDURE — 3008F BODY MASS INDEX DOCD: CPT | Mod: CPTII,S$GLB,, | Performed by: PODIATRIST

## 2021-10-01 PROCEDURE — 1159F PR MEDICATION LIST DOCUMENTED IN MEDICAL RECORD: ICD-10-PCS | Mod: CPTII,S$GLB,, | Performed by: PODIATRIST

## 2021-10-01 PROCEDURE — 99999 PR PBB SHADOW E&M-EST. PATIENT-LVL III: CPT | Mod: PBBFAC,,, | Performed by: PODIATRIST

## 2021-10-01 PROCEDURE — 3078F DIAST BP <80 MM HG: CPT | Mod: CPTII,S$GLB,, | Performed by: PODIATRIST

## 2021-10-01 PROCEDURE — 99999 PR PBB SHADOW E&M-EST. PATIENT-LVL III: ICD-10-PCS | Mod: PBBFAC,,, | Performed by: PODIATRIST

## 2021-10-01 PROCEDURE — 3078F PR MOST RECENT DIASTOLIC BLOOD PRESSURE < 80 MM HG: ICD-10-PCS | Mod: CPTII,S$GLB,, | Performed by: PODIATRIST

## 2021-10-01 RX ORDER — DICLOFENAC SODIUM 10 MG/G
2 GEL TOPICAL 4 TIMES DAILY
Qty: 100 G | Refills: 3 | Status: ON HOLD | OUTPATIENT
Start: 2021-10-01 | End: 2024-01-04 | Stop reason: HOSPADM

## 2021-10-28 ENCOUNTER — LAB VISIT (OUTPATIENT)
Dept: LAB | Facility: HOSPITAL | Age: 61
End: 2021-10-28
Attending: NURSE PRACTITIONER
Payer: COMMERCIAL

## 2021-10-28 ENCOUNTER — PATIENT MESSAGE (OUTPATIENT)
Dept: ENDOCRINOLOGY | Facility: CLINIC | Age: 61
End: 2021-10-28
Payer: COMMERCIAL

## 2021-10-28 DIAGNOSIS — E03.9 HYPOTHYROIDISM (ACQUIRED): ICD-10-CM

## 2021-10-28 LAB — TSH SERPL DL<=0.005 MIU/L-ACNC: 1.43 UIU/ML (ref 0.4–4)

## 2021-10-28 PROCEDURE — 36415 COLL VENOUS BLD VENIPUNCTURE: CPT | Mod: PO | Performed by: NURSE PRACTITIONER

## 2021-10-28 PROCEDURE — 84443 ASSAY THYROID STIM HORMONE: CPT | Mod: PO | Performed by: NURSE PRACTITIONER

## 2021-12-09 ENCOUNTER — LAB VISIT (OUTPATIENT)
Dept: LAB | Facility: HOSPITAL | Age: 61
End: 2021-12-09
Attending: UROLOGY
Payer: COMMERCIAL

## 2021-12-09 DIAGNOSIS — N40.1 BPH WITH OBSTRUCTION/LOWER URINARY TRACT SYMPTOMS: ICD-10-CM

## 2021-12-09 DIAGNOSIS — N13.8 BPH WITH OBSTRUCTION/LOWER URINARY TRACT SYMPTOMS: ICD-10-CM

## 2021-12-09 LAB — COMPLEXED PSA SERPL-MCNC: 0.71 NG/ML (ref 0–4)

## 2021-12-09 PROCEDURE — 36415 COLL VENOUS BLD VENIPUNCTURE: CPT | Mod: PO | Performed by: PHYSICIAN ASSISTANT

## 2021-12-09 PROCEDURE — 84153 ASSAY OF PSA TOTAL: CPT | Performed by: PHYSICIAN ASSISTANT

## 2021-12-13 ENCOUNTER — OFFICE VISIT (OUTPATIENT)
Dept: UROLOGY | Facility: CLINIC | Age: 61
End: 2021-12-13
Payer: COMMERCIAL

## 2021-12-13 VITALS
HEIGHT: 78 IN | HEART RATE: 64 BPM | BODY MASS INDEX: 34.59 KG/M2 | WEIGHT: 299 LBS | DIASTOLIC BLOOD PRESSURE: 75 MMHG | SYSTOLIC BLOOD PRESSURE: 123 MMHG

## 2021-12-13 DIAGNOSIS — N40.0 BENIGN NON-NODULAR PROSTATIC HYPERPLASIA WITHOUT LOWER URINARY TRACT SYMPTOMS: Primary | ICD-10-CM

## 2021-12-13 PROCEDURE — 99213 PR OFFICE/OUTPT VISIT, EST, LEVL III, 20-29 MIN: ICD-10-PCS | Mod: S$GLB,,, | Performed by: UROLOGY

## 2021-12-13 PROCEDURE — 99213 OFFICE O/P EST LOW 20 MIN: CPT | Mod: S$GLB,,, | Performed by: UROLOGY

## 2021-12-13 PROCEDURE — 99999 PR PBB SHADOW E&M-EST. PATIENT-LVL III: ICD-10-PCS | Mod: PBBFAC,,, | Performed by: UROLOGY

## 2021-12-13 PROCEDURE — 99999 PR PBB SHADOW E&M-EST. PATIENT-LVL III: CPT | Mod: PBBFAC,,, | Performed by: UROLOGY

## 2022-04-29 ENCOUNTER — TELEPHONE (OUTPATIENT)
Dept: INTERNAL MEDICINE | Facility: CLINIC | Age: 62
End: 2022-04-29
Payer: COMMERCIAL

## 2022-04-29 DIAGNOSIS — E03.9 HYPOTHYROIDISM (ACQUIRED): ICD-10-CM

## 2022-04-29 DIAGNOSIS — Z00.00 ANNUAL PHYSICAL EXAM: Primary | ICD-10-CM

## 2022-04-29 NOTE — TELEPHONE ENCOUNTER
Spoke to pt and he is awaiting lab orders.    ----- Message from Berenice Saba sent at 4/29/2022 12:30 PM CDT -----  Contact: Self   Patient is returning a phone call.  Who left a message for the patient: Karoline Patel MA  Does patient know what this is regarding:    Would you like a call back, or a response through your [facility] portal?:   call back  Comments:

## 2022-04-29 NOTE — TELEPHONE ENCOUNTER
Called pt to schedule lab appt  no answer LVM to call [phone] and someone can help schedule appt

## 2022-04-29 NOTE — TELEPHONE ENCOUNTER
----- Message from Sherri Dowell sent at 4/29/2022 11:44 AM CDT -----  type: Lab    Caller is requesting to schedule their Lab appointment prior to annual appointment.  Order is not listed in EPIC.  Please enter order and contact patient to schedule.    Preferred Date and Time of Labs: week before annual   Date of Annual Physical Appointment 6/16/22  Where would they like the lab performed?[facility]   Would the patient rather a call back or a response via [facility]? Call     Best Call Back Number:[phone]  Additional Information:

## 2022-05-02 ENCOUNTER — TELEPHONE (OUTPATIENT)
Dept: INTERNAL MEDICINE | Facility: CLINIC | Age: 62
End: 2022-05-02
Payer: COMMERCIAL

## 2022-05-03 NOTE — PROGRESS NOTES
"Subjective:      Patient ID: Zaheer Neal is a 61 y.o. male.    Chief Complaint:  Hypothyroidism    History of Present Illness  Zaheer Neal is here for follow up of postsurgical hypothyroidism.  Previously seen by me 7/2021.    With regards to hypothyroidism:    Thyroid cancer 12/2011 diagnosed in [place].   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  Thyroid gland is surgically absent. Neither surgical bed has evidence of residual/recurrent thyroid tissue.  2.  Real-time survey of the bilateral central and lateral neck did not reveal any abnormal appearing lymph nodes.  RECOMMENDATIONS:  Repeat surveillance ultrasound as dictated by thyroglobulin titers.    Current Medication:  Synthroid 150mcg daily     Biotin Use: Denies    Takes thyroid medication properly without food first thing in the morning.    Current Symptoms:   Denies unexplained weight gain  Denies fatigue   Denies Constipation   Denies Hair loss  Denies Brittle nails  Denies Mental fog   Denies cp, palpations or sob  Denies Heat intolerance  Denies Cold intolerance    Review of Systems   as above    Objective:   Physical Exam  Vitals reviewed.   Cardiovascular:      Rate and Rhythm: Normal rate.      Comments: No edema present  Pulmonary:      Effort: Pulmonary effort is normal.   Abdominal:      Palpations: Abdomen is soft.       Visit Vitals  /63   Pulse 70   Ht 6' 6" (1.981 m)   Wt 131.9 kg (290 lb 14.4 oz)   SpO2 99%   BMI 33.62 kg/m²     Body mass index is 33.62 kg/m².    Lab Review:   Lab Results   Component Value Date    HGBA1C 5.6 05/12/2020     Lab Results   Component Value Date    " CHOL 172 06/15/2021    HDL 36 (L) 06/15/2021    LDLCALC 103.8 06/15/2021    TRIG 161 (H) 06/15/2021    CHOLHDL 20.9 06/15/2021     Lab Results   Component Value Date     06/15/2021    K 4.8 06/15/2021     06/15/2021    CO2 27 06/15/2021     06/15/2021    BUN 11 06/15/2021    CREATININE 1.2 06/15/2021    CALCIUM 9.7 06/15/2021    PROT 7.6 06/15/2021    ALBUMIN 4.0 06/15/2021    BILITOT 0.6 06/15/2021    ALKPHOS 74 06/15/2021    AST 34 06/15/2021    ALT 31 06/15/2021    ANIONGAP 8 06/15/2021    ESTGFRAFRICA >60.0 06/15/2021    EGFRNONAA >60.0 06/15/2021    TSH 1.430 10/28/2021     Vit D, 25-Hydroxy   Date Value Ref Range Status   02/03/2015 41 30 - 96 ng/mL Final     Comment:     Vitamin D deficiency.........<10 ng/mL                              Vitamin D insufficiency......10-29 ng/mL       Vitamin D sufficiency........> or equal to 30 ng/mL  Vitamin D toxicity............>100 ng/mL       Assessment and Plan     1. Hypothyroidism (acquired)  TSH    Thyroglobulin   2. Malignant neoplasm of thyroid gland  TSH    Thyroglobulin     Hypothyroidism (acquired)  -- Labs now.  -- Medication Changes:   CONTINUE: Synthroid 150mcg daily.  -- Goal is a low normal TSH.  -- Avoid exogenous hyperthyroidism as this can accelerate bone loss and increase risk of CV complications.  -- Advised to take LT4 on an empty stomach with water and to wait 30-45 minutes before eating or taking other medications   -- Reviewed usual times of thyroid hormone changes  -- Reviewed that symptoms of hypothyroidism may not correlate with tsh, and a normal TSH is the goal of therapy. Symptoms are not a justification for over treatment.    Malignant neoplasm of thyroid gland  -- Tg now.  -- Thyroid US if change in Tg or clinical change.    Follow up in about 1 year (around 5/5/2023).

## 2022-05-05 ENCOUNTER — OFFICE VISIT (OUTPATIENT)
Dept: ENDOCRINOLOGY | Facility: CLINIC | Age: 62
End: 2022-05-05
Payer: COMMERCIAL

## 2022-05-05 VITALS
HEIGHT: 78 IN | BODY MASS INDEX: 33.65 KG/M2 | DIASTOLIC BLOOD PRESSURE: 63 MMHG | WEIGHT: 290.88 LBS | OXYGEN SATURATION: 99 % | HEART RATE: 70 BPM | SYSTOLIC BLOOD PRESSURE: 125 MMHG

## 2022-05-05 DIAGNOSIS — C73 MALIGNANT NEOPLASM OF THYROID GLAND: ICD-10-CM

## 2022-05-05 DIAGNOSIS — E03.9 HYPOTHYROIDISM (ACQUIRED): Primary | ICD-10-CM

## 2022-05-05 PROCEDURE — 3078F PR MOST RECENT DIASTOLIC BLOOD PRESSURE < 80 MM HG: ICD-10-PCS | Mod: CPTII,S$GLB,, | Performed by: NURSE PRACTITIONER

## 2022-05-05 PROCEDURE — 3078F DIAST BP <80 MM HG: CPT | Mod: CPTII,S$GLB,, | Performed by: NURSE PRACTITIONER

## 2022-05-05 PROCEDURE — 3074F SYST BP LT 130 MM HG: CPT | Mod: CPTII,S$GLB,, | Performed by: NURSE PRACTITIONER

## 2022-05-05 PROCEDURE — 99214 PR OFFICE/OUTPT VISIT, EST, LEVL IV, 30-39 MIN: ICD-10-PCS | Mod: S$GLB,,, | Performed by: NURSE PRACTITIONER

## 2022-05-05 PROCEDURE — 1160F RVW MEDS BY RX/DR IN RCRD: CPT | Mod: CPTII,S$GLB,, | Performed by: NURSE PRACTITIONER

## 2022-05-05 PROCEDURE — 1159F PR MEDICATION LIST DOCUMENTED IN MEDICAL RECORD: ICD-10-PCS | Mod: CPTII,S$GLB,, | Performed by: NURSE PRACTITIONER

## 2022-05-05 PROCEDURE — 99999 PR PBB SHADOW E&M-EST. PATIENT-LVL IV: ICD-10-PCS | Mod: PBBFAC,,, | Performed by: NURSE PRACTITIONER

## 2022-05-05 PROCEDURE — 3008F PR BODY MASS INDEX (BMI) DOCUMENTED: ICD-10-PCS | Mod: CPTII,S$GLB,, | Performed by: NURSE PRACTITIONER

## 2022-05-05 PROCEDURE — 3074F PR MOST RECENT SYSTOLIC BLOOD PRESSURE < 130 MM HG: ICD-10-PCS | Mod: CPTII,S$GLB,, | Performed by: NURSE PRACTITIONER

## 2022-05-05 PROCEDURE — 99214 OFFICE O/P EST MOD 30 MIN: CPT | Mod: S$GLB,,, | Performed by: NURSE PRACTITIONER

## 2022-05-05 PROCEDURE — 3008F BODY MASS INDEX DOCD: CPT | Mod: CPTII,S$GLB,, | Performed by: NURSE PRACTITIONER

## 2022-05-05 PROCEDURE — 99999 PR PBB SHADOW E&M-EST. PATIENT-LVL IV: CPT | Mod: PBBFAC,,, | Performed by: NURSE PRACTITIONER

## 2022-05-05 PROCEDURE — 1159F MED LIST DOCD IN RCRD: CPT | Mod: CPTII,S$GLB,, | Performed by: NURSE PRACTITIONER

## 2022-05-05 PROCEDURE — 1160F PR REVIEW ALL MEDS BY PRESCRIBER/CLIN PHARMACIST DOCUMENTED: ICD-10-PCS | Mod: CPTII,S$GLB,, | Performed by: NURSE PRACTITIONER

## 2022-05-05 NOTE — ASSESSMENT & PLAN NOTE
-- Labs now.  -- Medication Changes:   CONTINUE: Synthroid 150mcg daily.  -- Goal is a low normal TSH.  -- Avoid exogenous hyperthyroidism as this can accelerate bone loss and increase risk of CV complications.  -- Advised to take LT4 on an empty stomach with water and to wait 30-45 minutes before eating or taking other medications   -- Reviewed usual times of thyroid hormone changes  -- Reviewed that symptoms of hypothyroidism may not correlate with tsh, and a normal TSH is the goal of therapy. Symptoms are not a justification for over treatment.

## 2022-06-13 ENCOUNTER — LAB VISIT (OUTPATIENT)
Dept: LAB | Facility: HOSPITAL | Age: 62
End: 2022-06-13
Attending: UROLOGY
Payer: COMMERCIAL

## 2022-06-13 DIAGNOSIS — Z12.5 PROSTATE CANCER SCREENING: ICD-10-CM

## 2022-06-13 DIAGNOSIS — E03.9 HYPOTHYROIDISM (ACQUIRED): ICD-10-CM

## 2022-06-13 DIAGNOSIS — N40.0 BENIGN NON-NODULAR PROSTATIC HYPERPLASIA WITHOUT LOWER URINARY TRACT SYMPTOMS: ICD-10-CM

## 2022-06-13 DIAGNOSIS — Z00.00 ANNUAL PHYSICAL EXAM: ICD-10-CM

## 2022-06-13 DIAGNOSIS — C73 MALIGNANT NEOPLASM OF THYROID GLAND: ICD-10-CM

## 2022-06-13 LAB
ALBUMIN SERPL BCP-MCNC: 4.6 G/DL (ref 3.5–5.2)
ALP SERPL-CCNC: 70 U/L (ref 38–126)
ALT SERPL W/O P-5'-P-CCNC: 38 U/L (ref 10–44)
ANION GAP SERPL CALC-SCNC: 10 MMOL/L (ref 8–16)
AST SERPL-CCNC: 40 U/L (ref 15–46)
BILIRUB SERPL-MCNC: 0.5 MG/DL (ref 0.1–1)
CALCIUM SERPL-MCNC: 9.2 MG/DL (ref 8.7–10.5)
CHLORIDE SERPL-SCNC: 101 MMOL/L (ref 95–110)
CHOLEST SERPL-MCNC: 168 MG/DL (ref 120–199)
CHOLEST/HDLC SERPL: 4.2 {RATIO} (ref 2–5)
CO2 SERPL-SCNC: 30 MMOL/L (ref 23–29)
COMPLEXED PSA SERPL-MCNC: 0.97 NG/ML (ref 0–4)
COMPLEXED PSA SERPL-MCNC: 0.97 NG/ML (ref 0–4)
CREAT SERPL-MCNC: 1.13 MG/DL (ref 0.5–1.4)
EST. GFR  (AFRICAN AMERICAN): >60 ML/MIN/1.73 M^2
EST. GFR  (NON AFRICAN AMERICAN): >60 ML/MIN/1.73 M^2
GLUCOSE SERPL-MCNC: 119 MG/DL (ref 70–110)
HDLC SERPL-MCNC: 40 MG/DL (ref 40–75)
HDLC SERPL: 23.8 % (ref 20–50)
LDLC SERPL CALC-MCNC: 107.6 MG/DL (ref 63–159)
NONHDLC SERPL-MCNC: 128 MG/DL
POTASSIUM SERPL-SCNC: 4.1 MMOL/L (ref 3.5–5.1)
PROT SERPL-MCNC: 7.5 G/DL (ref 6–8.4)
SODIUM SERPL-SCNC: 141 MMOL/L (ref 136–145)
TRIGL SERPL-MCNC: 102 MG/DL (ref 30–150)
TSH SERPL DL<=0.005 MIU/L-ACNC: 0.97 UIU/ML (ref 0.4–4)
TSH SERPL DL<=0.005 MIU/L-ACNC: 0.97 UIU/ML (ref 0.4–4)
UUN UR-MCNC: 14 MG/DL (ref 2–20)

## 2022-06-13 PROCEDURE — 84432 ASSAY OF THYROGLOBULIN: CPT | Mod: PO | Performed by: NURSE PRACTITIONER

## 2022-06-13 PROCEDURE — 80053 COMPREHEN METABOLIC PANEL: CPT | Mod: PO | Performed by: FAMILY MEDICINE

## 2022-06-13 PROCEDURE — 84153 ASSAY OF PSA TOTAL: CPT | Performed by: UROLOGY

## 2022-06-13 PROCEDURE — 84153 ASSAY OF PSA TOTAL: CPT | Mod: 91 | Performed by: FAMILY MEDICINE

## 2022-06-13 PROCEDURE — 36415 COLL VENOUS BLD VENIPUNCTURE: CPT | Mod: PO | Performed by: NURSE PRACTITIONER

## 2022-06-13 PROCEDURE — 84443 ASSAY THYROID STIM HORMONE: CPT | Mod: PO | Performed by: FAMILY MEDICINE

## 2022-06-13 PROCEDURE — 80061 LIPID PANEL: CPT | Performed by: FAMILY MEDICINE

## 2022-06-16 ENCOUNTER — OFFICE VISIT (OUTPATIENT)
Dept: INTERNAL MEDICINE | Facility: CLINIC | Age: 62
End: 2022-06-16
Attending: FAMILY MEDICINE
Payer: COMMERCIAL

## 2022-06-16 ENCOUNTER — LAB VISIT (OUTPATIENT)
Dept: LAB | Facility: HOSPITAL | Age: 62
End: 2022-06-16
Attending: FAMILY MEDICINE
Payer: COMMERCIAL

## 2022-06-16 VITALS
DIASTOLIC BLOOD PRESSURE: 78 MMHG | HEIGHT: 78 IN | HEART RATE: 75 BPM | OXYGEN SATURATION: 99 % | SYSTOLIC BLOOD PRESSURE: 120 MMHG | BODY MASS INDEX: 33.67 KG/M2 | WEIGHT: 291 LBS

## 2022-06-16 DIAGNOSIS — R73.9 ELEVATED BLOOD SUGAR: ICD-10-CM

## 2022-06-16 DIAGNOSIS — E89.0 POST-SURGICAL HYPOTHYROIDISM: ICD-10-CM

## 2022-06-16 DIAGNOSIS — E03.9 HYPOTHYROIDISM (ACQUIRED): ICD-10-CM

## 2022-06-16 DIAGNOSIS — Z00.00 ANNUAL PHYSICAL EXAM: Primary | ICD-10-CM

## 2022-06-16 DIAGNOSIS — N40.0 BENIGN PROSTATIC HYPERPLASIA, UNSPECIFIED WHETHER LOWER URINARY TRACT SYMPTOMS PRESENT: ICD-10-CM

## 2022-06-16 LAB
ESTIMATED AVG GLUCOSE: 117 MG/DL (ref 68–131)
HBA1C MFR BLD: 5.7 % (ref 4–5.6)

## 2022-06-16 PROCEDURE — 99396 PREV VISIT EST AGE 40-64: CPT | Mod: S$GLB,,, | Performed by: FAMILY MEDICINE

## 2022-06-16 PROCEDURE — 99396 PR PREVENTIVE VISIT,EST,40-64: ICD-10-PCS | Mod: S$GLB,,, | Performed by: FAMILY MEDICINE

## 2022-06-16 PROCEDURE — 1160F PR REVIEW ALL MEDS BY PRESCRIBER/CLIN PHARMACIST DOCUMENTED: ICD-10-PCS | Mod: CPTII,S$GLB,, | Performed by: FAMILY MEDICINE

## 2022-06-16 PROCEDURE — 1159F MED LIST DOCD IN RCRD: CPT | Mod: CPTII,S$GLB,, | Performed by: FAMILY MEDICINE

## 2022-06-16 PROCEDURE — 3074F PR MOST RECENT SYSTOLIC BLOOD PRESSURE < 130 MM HG: ICD-10-PCS | Mod: CPTII,S$GLB,, | Performed by: FAMILY MEDICINE

## 2022-06-16 PROCEDURE — 3078F PR MOST RECENT DIASTOLIC BLOOD PRESSURE < 80 MM HG: ICD-10-PCS | Mod: CPTII,S$GLB,, | Performed by: FAMILY MEDICINE

## 2022-06-16 PROCEDURE — 3008F PR BODY MASS INDEX (BMI) DOCUMENTED: ICD-10-PCS | Mod: CPTII,S$GLB,, | Performed by: FAMILY MEDICINE

## 2022-06-16 PROCEDURE — 1159F PR MEDICATION LIST DOCUMENTED IN MEDICAL RECORD: ICD-10-PCS | Mod: CPTII,S$GLB,, | Performed by: FAMILY MEDICINE

## 2022-06-16 PROCEDURE — 1160F RVW MEDS BY RX/DR IN RCRD: CPT | Mod: CPTII,S$GLB,, | Performed by: FAMILY MEDICINE

## 2022-06-16 PROCEDURE — 83036 HEMOGLOBIN GLYCOSYLATED A1C: CPT | Performed by: FAMILY MEDICINE

## 2022-06-16 PROCEDURE — 36415 COLL VENOUS BLD VENIPUNCTURE: CPT | Performed by: FAMILY MEDICINE

## 2022-06-16 PROCEDURE — 99999 PR PBB SHADOW E&M-EST. PATIENT-LVL IV: ICD-10-PCS | Mod: PBBFAC,,, | Performed by: FAMILY MEDICINE

## 2022-06-16 PROCEDURE — 3074F SYST BP LT 130 MM HG: CPT | Mod: CPTII,S$GLB,, | Performed by: FAMILY MEDICINE

## 2022-06-16 PROCEDURE — 3078F DIAST BP <80 MM HG: CPT | Mod: CPTII,S$GLB,, | Performed by: FAMILY MEDICINE

## 2022-06-16 PROCEDURE — 99999 PR PBB SHADOW E&M-EST. PATIENT-LVL IV: CPT | Mod: PBBFAC,,, | Performed by: FAMILY MEDICINE

## 2022-06-16 PROCEDURE — 3008F BODY MASS INDEX DOCD: CPT | Mod: CPTII,S$GLB,, | Performed by: FAMILY MEDICINE

## 2022-06-16 NOTE — PROGRESS NOTES
Subjective:       Patient ID: Zaheer Neal is a 62 y.o. male.    Chief Complaint: Annual Exam    Established patient for an annual wellness check/physical exam and also chronic disease management. Specific complaints - see dictation and please see ROS.  P, S, Fm, Soc Hx's; Meds, allergies reviewed and reconciled.  Health maintenance file reviewed and addressed items due. Recent applicable lab, imaging and cardiovascular results reviewed.  Problem list items reviewed and modified or added entries (in the overview section) may not be transcribed into this encounter note due to note writer format.        Review of Systems   Constitutional: Negative for appetite change, chills, diaphoresis, fatigue and fever.   HENT: Negative for congestion, postnasal drip, rhinorrhea, sore throat and trouble swallowing.    Eyes: Negative for visual disturbance.   Respiratory: Negative for cough, choking, chest tightness, shortness of breath and wheezing.    Cardiovascular: Negative for chest pain and leg swelling.   Gastrointestinal: Negative for abdominal distention, abdominal pain, diarrhea, nausea and vomiting.   Genitourinary: Negative for difficulty urinating and hematuria.   Musculoskeletal: Negative for arthralgias and myalgias.   Skin: Negative for rash.   Neurological: Negative for weakness, light-headedness and headaches.   Psychiatric/Behavioral: Negative for confusion and dysphoric mood.       Objective:      Physical Exam  Vitals and nursing note reviewed.   Constitutional:       Appearance: He is well-developed. He is not diaphoretic.   Eyes:      General: No scleral icterus.  Neck:      Thyroid: No thyromegaly.      Vascular: No carotid bruit or JVD.      Trachea: No tracheal deviation.   Cardiovascular:      Rate and Rhythm: Normal rate and regular rhythm.      Heart sounds: Normal heart sounds. No murmur heard.    No friction rub. No gallop.   Pulmonary:      Effort: Pulmonary effort is normal. No  respiratory distress.      Breath sounds: Normal breath sounds. No wheezing or rales.   Abdominal:      General: There is no distension.      Palpations: Abdomen is soft. There is no mass.      Tenderness: There is no abdominal tenderness. There is no guarding or rebound.   Musculoskeletal:      Cervical back: Normal range of motion and neck supple.   Lymphadenopathy:      Cervical: No cervical adenopathy.   Skin:     General: Skin is warm and dry.      Findings: No erythema or rash.   Neurological:      Mental Status: He is alert and oriented to person, place, and time.      Cranial Nerves: No cranial nerve deficit.      Motor: No tremor.      Coordination: Coordination normal.      Gait: Gait normal.   Psychiatric:         Behavior: Behavior normal.         Thought Content: Thought content normal.         Judgment: Judgment normal.         Assessment:       1. Annual physical exam    2. Elevated blood sugar    3. Hypothyroidism (acquired)    4. Post-surgical hypothyroidism    5. Benign prostatic hyperplasia, unspecified whether lower urinary tract symptoms present        Plan:     Medication List with Changes/Refills   Current Medications    ASCORBIC ACID, VITAMIN C, (VITAMIN C) 500 MG TABLET    Take 500 mg by mouth once daily.    CALCIUM CARBONATE (OS-BISMARK) 600 MG CALCIUM (1,500 MG) TAB    Take 600 mg by mouth once.    CHOLECALCIFEROL, VITAMIN D3, (VITAMIN D3) 25 MCG (1,000 UNIT) CAPSULE    Take 1,000 Units by mouth once daily.    DICLOFENAC SODIUM (VOLTAREN) 1 % GEL    Apply 2 g topically 4 (four) times daily. To areas of foot pain    FISH OIL-OMEGA-3 FATTY ACIDS 300-1,000 MG CAPSULE    Take by mouth once daily.    LACTOBACILLUS RHAMNOSUS GG (CULTURELLE) 10 BILLION CELL CAPSULE    Take 1 capsule by mouth once daily.    MULTIVITAMIN (THERAGRAN) PER TABLET    Take 1 tablet by mouth once daily.    POMEGRANATE XT/POMEGRANAT SEED (POMEGRANATE ORAL)    Take by mouth.    PUMPKIN SEED EXTRACT-SOY GERM 300 MG CAP    Take  by mouth.    SYNTHROID 150 MCG TABLET    Take 1 tablet (150 mcg total) by mouth before breakfast.    TAMSULOSIN (FLOMAX) 0.4 MG CAP    Take 1 capsule (0.4 mg total) by mouth once daily.     Zaheer was seen today for annual exam.    Diagnoses and all orders for this visit:    Annual physical exam    Elevated blood sugar  -     Hemoglobin A1C; Future    Hypothyroidism (acquired)    Post-surgical hypothyroidism    Benign prostatic hyperplasia, unspecified whether lower urinary tract symptoms present      See meds, orders, follow up, routing and instructions sections of encounter and AVS. Discussed with patient and provided on AVS.    Discussed diet and exercise and links provided on AVS for detailed information.    Lab Results   Component Value Date     06/13/2022    K 4.1 06/13/2022     06/13/2022    BUN 14 06/13/2022    CREATININE 1.13 06/13/2022     (H) 06/13/2022    HGBA1C 5.6 05/12/2020    AST 40 06/13/2022    AST 50 (H) 04/15/2016    ALT 38 06/13/2022    ALBUMIN 4.6 06/13/2022    PROT 7.5 06/13/2022    BILITOT 0.5 06/13/2022    CHOL 168 06/13/2022    CHOL 172 09/26/2017    HDL 40 06/13/2022    LDLCALC 107.6 06/13/2022    TRIG 102 06/13/2022    WBC 4.08 11/12/2014    HGB 14.0 11/12/2014    HCT 41.6 11/12/2014     11/12/2014    PSA 0.97 06/13/2022    PSADIAG 0.97 06/13/2022    TSH 0.975 06/13/2022    TSH 0.975 06/13/2022

## 2022-07-07 DIAGNOSIS — N40.1 BENIGN PROSTATIC HYPERPLASIA WITH NOCTURIA: ICD-10-CM

## 2022-07-07 DIAGNOSIS — R35.1 BENIGN PROSTATIC HYPERPLASIA WITH NOCTURIA: ICD-10-CM

## 2022-07-08 RX ORDER — TAMSULOSIN HYDROCHLORIDE 0.4 MG/1
CAPSULE ORAL
Qty: 90 CAPSULE | Refills: 3 | Status: SHIPPED | OUTPATIENT
Start: 2022-07-08 | End: 2022-07-08 | Stop reason: SDUPTHER

## 2022-07-08 NOTE — TELEPHONE ENCOUNTER
No new care gaps identified.  [facility] Embedded Care Gaps. Reference number: [id number]. 7/07/2022   10:29:20 PM CDT

## 2022-07-08 NOTE — TELEPHONE ENCOUNTER
Refill Authorization Note   Zaheer Neal  is requesting a refill authorization.  Brief Assessment and Rationale for Refill:  Approve     Medication Therapy Plan:       Medication Reconciliation Completed: No   Comments:     No Care Gaps recommended.     Note composed:2:04 AM 07/08/2022

## 2022-09-18 ENCOUNTER — PATIENT MESSAGE (OUTPATIENT)
Dept: ENDOCRINOLOGY | Facility: CLINIC | Age: 62
End: 2022-09-18
Payer: COMMERCIAL

## 2022-09-19 DIAGNOSIS — E89.0 POST-SURGICAL HYPOTHYROIDISM: ICD-10-CM

## 2022-09-19 RX ORDER — LEVOTHYROXINE SODIUM 150 MCG
TABLET ORAL
Qty: 90 TABLET | Refills: 3 | Status: SHIPPED | OUTPATIENT
Start: 2022-09-19 | End: 2022-09-24 | Stop reason: SDUPTHER

## 2022-09-22 DIAGNOSIS — E89.0 POST-SURGICAL HYPOTHYROIDISM: ICD-10-CM

## 2022-09-22 NOTE — TELEPHONE ENCOUNTER
----- Message from Sherri Dowell sent at 9/22/2022 10:28 AM CDT -----  Contact: [phone]  Requesting an RX refill or new RX.  Is this a refill or new RX: refill  RX name and strength (copy/paste from chart):  SYNTHROID 150 mcg tablet  Is this a 30 day or 90 day RX:   Pharmacy name and phone # (copy/paste from chart):    [id number] - [facility] [address] [facility] AT [facility] [address]  [address] [facility] [address]  Phone: [phone] Fax: [phone]  The doctors have asked that we provide their patients with the following 2 reminders -- prescription refills can take up to 72 hours, and a friendly reminder that in the future you can use your [facility] account to request refills: aware

## 2022-09-22 NOTE — TELEPHONE ENCOUNTER
No new care gaps identified.  [facility] Embedded Care Gaps. Reference number: [id number]. 9/22/2022   10:33:16 AM ALEXANDRT

## 2022-09-23 RX ORDER — LEVOTHYROXINE SODIUM 150 MCG
TABLET ORAL
Qty: 90 TABLET | Refills: 3 | OUTPATIENT
Start: 2022-09-23

## 2022-09-24 DIAGNOSIS — E89.0 POST-SURGICAL HYPOTHYROIDISM: ICD-10-CM

## 2022-09-26 RX ORDER — LEVOTHYROXINE SODIUM 150 MCG
TABLET ORAL
Qty: 90 TABLET | Refills: 3 | Status: SHIPPED | OUTPATIENT
Start: 2022-09-26 | End: 2022-09-26

## 2022-09-26 RX ORDER — LEVOTHYROXINE SODIUM 150 MCG
TABLET ORAL
Qty: 90 TABLET | Refills: 3 | Status: SHIPPED | OUTPATIENT
Start: 2022-09-26 | End: 2023-05-17

## 2022-12-29 ENCOUNTER — TELEPHONE (OUTPATIENT)
Dept: ENDOCRINOLOGY | Facility: CLINIC | Age: 62
End: 2022-12-29
Payer: COMMERCIAL

## 2023-01-05 ENCOUNTER — OFFICE VISIT (OUTPATIENT)
Dept: UROLOGY | Facility: CLINIC | Age: 63
End: 2023-01-05
Payer: COMMERCIAL

## 2023-01-05 ENCOUNTER — LAB VISIT (OUTPATIENT)
Dept: LAB | Facility: HOSPITAL | Age: 63
End: 2023-01-05
Attending: UROLOGY
Payer: COMMERCIAL

## 2023-01-05 VITALS
SYSTOLIC BLOOD PRESSURE: 116 MMHG | WEIGHT: 291 LBS | HEART RATE: 70 BPM | HEIGHT: 78 IN | DIASTOLIC BLOOD PRESSURE: 70 MMHG | BODY MASS INDEX: 33.67 KG/M2

## 2023-01-05 DIAGNOSIS — Z80.42 FAMILY HISTORY OF PROSTATE CANCER: Primary | ICD-10-CM

## 2023-01-05 DIAGNOSIS — Z80.42 FAMILY HISTORY OF PROSTATE CANCER: ICD-10-CM

## 2023-01-05 LAB — COMPLEXED PSA SERPL-MCNC: 1.1 NG/ML (ref 0–4)

## 2023-01-05 PROCEDURE — 1159F PR MEDICATION LIST DOCUMENTED IN MEDICAL RECORD: ICD-10-PCS | Mod: CPTII,S$GLB,, | Performed by: UROLOGY

## 2023-01-05 PROCEDURE — 99999 PR PBB SHADOW E&M-EST. PATIENT-LVL III: CPT | Mod: PBBFAC,,, | Performed by: UROLOGY

## 2023-01-05 PROCEDURE — 3008F BODY MASS INDEX DOCD: CPT | Mod: CPTII,S$GLB,, | Performed by: UROLOGY

## 2023-01-05 PROCEDURE — 99213 OFFICE O/P EST LOW 20 MIN: CPT | Mod: S$GLB,,, | Performed by: UROLOGY

## 2023-01-05 PROCEDURE — 3074F SYST BP LT 130 MM HG: CPT | Mod: CPTII,S$GLB,, | Performed by: UROLOGY

## 2023-01-05 PROCEDURE — 1159F MED LIST DOCD IN RCRD: CPT | Mod: CPTII,S$GLB,, | Performed by: UROLOGY

## 2023-01-05 PROCEDURE — 3078F DIAST BP <80 MM HG: CPT | Mod: CPTII,S$GLB,, | Performed by: UROLOGY

## 2023-01-05 PROCEDURE — 3008F PR BODY MASS INDEX (BMI) DOCUMENTED: ICD-10-PCS | Mod: CPTII,S$GLB,, | Performed by: UROLOGY

## 2023-01-05 PROCEDURE — 84153 ASSAY OF PSA TOTAL: CPT | Performed by: UROLOGY

## 2023-01-05 PROCEDURE — 99213 PR OFFICE/OUTPT VISIT, EST, LEVL III, 20-29 MIN: ICD-10-PCS | Mod: S$GLB,,, | Performed by: UROLOGY

## 2023-01-05 PROCEDURE — 3078F PR MOST RECENT DIASTOLIC BLOOD PRESSURE < 80 MM HG: ICD-10-PCS | Mod: CPTII,S$GLB,, | Performed by: UROLOGY

## 2023-01-05 PROCEDURE — 1160F PR REVIEW ALL MEDS BY PRESCRIBER/CLIN PHARMACIST DOCUMENTED: ICD-10-PCS | Mod: CPTII,S$GLB,, | Performed by: UROLOGY

## 2023-01-05 PROCEDURE — 99999 PR PBB SHADOW E&M-EST. PATIENT-LVL III: ICD-10-PCS | Mod: PBBFAC,,, | Performed by: UROLOGY

## 2023-01-05 PROCEDURE — 36415 COLL VENOUS BLD VENIPUNCTURE: CPT | Performed by: UROLOGY

## 2023-01-05 PROCEDURE — 1160F RVW MEDS BY RX/DR IN RCRD: CPT | Mod: CPTII,S$GLB,, | Performed by: UROLOGY

## 2023-01-05 PROCEDURE — 3074F PR MOST RECENT SYSTOLIC BLOOD PRESSURE < 130 MM HG: ICD-10-PCS | Mod: CPTII,S$GLB,, | Performed by: UROLOGY

## 2023-01-05 NOTE — PROGRESS NOTES
Subjective:       Patient ID: Zaheer Nael is a 62 y.o. male.    Chief Complaint: Benign Prostatic Hypertrophy    HPI patient is here for prostate check.  He has a strong family history of prostate cancer.  His last PSA was less than 1 he would like to have a PSA today and every 6 months    Past Medical History:   Diagnosis Date    Chronic urticaria 11/12/2014    Colonic polyp     Malignant neoplasm of thyroid gland     thyroid    Obesity     Post-surgical hypothyroidism     PUD (peptic ulcer disease)     Thyroid cancer     Thyroid cancer     Urticaria        Past Surgical History:   Procedure Laterality Date    COLONOSCOPY N/A 2/5/2018    Procedure: COLONOSCOPY;  Surgeon: ABHISHEK Hitchcock MD;  Location: [facility] ENDO ([address] [place] [address]);  Service: Endoscopy;  Laterality: N/A;  confirmed appt.    KNEE SURGERY  8/2013    right knee-includes scope    THYROIDECTOMY         Family History   Problem Relation Age of Onset    Breast cancer Mother     Cancer Mother         breast    Breast cancer Sister     Cancer Sister         breast    Heart disease Father 64        MI    Diabetes Neg Hx     Thyroid cancer Neg Hx        Social History     Socioeconomic History    Marital status:      Spouse name: Aubrie    Number of children: 2   Tobacco Use    Smoking status: Never    Smokeless tobacco: Never   Substance and Sexual Activity    Alcohol use: No     Comment: rarely     Drug use: No    Sexual activity: Yes     Partners: Female   Social History Narrative    RM x 3, 2 kids,  [employer], played Spus, [place], [place], etc. [employer] for college. Born in [facility]     Social Determinants of Health     Financial Resource Strain: Low Risk     Difficulty of Paying Living Expenses: Not hard at all   Food Insecurity: No Food Insecurity    Worried About Running Out of Food in the Last Year: Never true    Ran Out of Food in the Last Year: Never true   Transportation Needs: No Transportation Needs    Lack of  Transportation (Medical): No    Lack of Transportation (Non-Medical): No   Physical Activity: Insufficiently Active    Days of Exercise per Week: 1 day    Minutes of Exercise per Session: 60 min   Stress: No Stress Concern Present    Feeling of Stress : Not at all   Social Connections: Unknown    Frequency of Communication with Friends and Family: More than three times a week    Frequency of Social Gatherings with Friends and Family: Once a week    Active Member of Clubs or Organizations: No    Attends Club or Organization Meetings: Never    Marital Status:    Housing Stability: Low Risk     Unable to Pay for Housing in the Last Year: No    Number of Places Lived in the Last Year: 1    Unstable Housing in the Last Year: No       Allergies:  Shellfish containing products    Medications:    Current Outpatient Medications:     ascorbic acid, vitamin C, (VITAMIN C) 500 MG tablet, Take 500 mg by mouth once daily., Disp: , Rfl:     calcium carbonate (OS-BISMARK) 600 mg calcium (1,500 mg) Tab, Take 600 mg by mouth once., Disp: , Rfl:     cholecalciferol, vitamin D3, (VITAMIN D3) 25 mcg (1,000 unit) capsule, Take 1,000 Units by mouth once daily., Disp: , Rfl:     diclofenac sodium (VOLTAREN) 1 % Gel, Apply 2 g topically 4 (four) times daily. To areas of foot pain, Disp: 100 g, Rfl: 3    fish oil-omega-3 fatty acids 300-1,000 mg capsule, Take by mouth once daily., Disp: , Rfl:     Lactobacillus rhamnosus GG (CULTURELLE) 10 billion cell capsule, Take 1 capsule by mouth once daily., Disp: , Rfl:     multivitamin (THERAGRAN) per tablet, Take 1 tablet by mouth once daily., Disp: , Rfl:     pomegranate xt/pomegranat seed (POMEGRANATE ORAL), Take by mouth., Disp: , Rfl:     pumpkin seed extract-soy germ 300 mg Cap, Take by mouth., Disp: , Rfl:     SYNTHROID 150 mcg tablet, TAKE 1 TABLET(150 MCG) BY MOUTH BEFORE BREAKFAST Strength: 150 mcg, Disp: 90 tablet, Rfl: 3    tamsulosin (FLOMAX) 0.4 mg Cap, Take 1 capsule (0.4 mg total)  by mouth once daily., Disp: 90 capsule, Rfl: 3    tamsulosin (FLOMAX) 0.4 mg Cap, Take 1 capsule (0.4 mg total) by mouth once daily., Disp: 90 capsule, Rfl: 3    Review of Systems   Constitutional:  Negative for activity change, appetite change, chills, diaphoresis, fatigue, fever and unexpected weight change.   HENT:  Negative for congestion, dental problem, hearing loss, mouth sores, postnasal drip, rhinorrhea, sinus pressure and trouble swallowing.    Eyes:  Negative for pain, discharge and itching.   Respiratory:  Negative for apnea, cough, choking, chest tightness, shortness of breath and wheezing.    Cardiovascular:  Negative for chest pain, palpitations and leg swelling.   Gastrointestinal:  Negative for abdominal distention, abdominal pain, anal bleeding, blood in stool, constipation, diarrhea, nausea, rectal pain and vomiting.   Endocrine: Negative for polydipsia and polyuria.   Genitourinary:  Negative for decreased urine volume, difficulty urinating, dysuria, enuresis, flank pain, frequency, genital sores, hematuria, penile discharge, penile pain, penile swelling, scrotal swelling, testicular pain and urgency.   Musculoskeletal:  Negative for arthralgias, back pain and myalgias.   Skin:  Negative for color change, rash and wound.   Neurological:  Negative for dizziness, syncope, speech difficulty, light-headedness and headaches.   Hematological:  Negative for adenopathy. Does not bruise/bleed easily.   Psychiatric/Behavioral:  Negative for behavioral problems, confusion, hallucinations and sleep disturbance.      Objective:      Physical Exam  Constitutional:       Appearance: He is well-developed.   HENT:      Head: Normocephalic.   Cardiovascular:      Rate and Rhythm: Normal rate.   Pulmonary:      Effort: Pulmonary effort is normal.   Abdominal:      Palpations: Abdomen is soft.   Genitourinary:     Prostate: Normal.   Skin:     General: Skin is warm.   Neurological:      Mental Status: He is alert.        Assessment:       1. Family history of prostate cancer          Plan:       Zaheer was seen today for benign prostatic hypertrophy.    Diagnoses and all orders for this visit:    Family history of prostate cancer  -     Prostate Specific Antigen, Diagnostic; Future  -     Prostate Specific Antigen, Diagnostic; Future        Return to clinic 6 months for VASILIY with PSA and will get a PSA today

## 2023-02-14 ENCOUNTER — OFFICE VISIT (OUTPATIENT)
Dept: PRIMARY CARE CLINIC | Facility: CLINIC | Age: 63
End: 2023-02-14
Payer: COMMERCIAL

## 2023-02-14 VITALS
HEART RATE: 82 BPM | OXYGEN SATURATION: 97 % | WEIGHT: 305.13 LBS | DIASTOLIC BLOOD PRESSURE: 80 MMHG | SYSTOLIC BLOOD PRESSURE: 132 MMHG | BODY MASS INDEX: 35.3 KG/M2 | HEIGHT: 78 IN

## 2023-02-14 DIAGNOSIS — Z91.013 SHELLFISH ALLERGY: Primary | ICD-10-CM

## 2023-02-14 DIAGNOSIS — L50.8 CHRONIC URTICARIA: ICD-10-CM

## 2023-02-14 PROCEDURE — 3075F PR MOST RECENT SYSTOLIC BLOOD PRESS GE 130-139MM HG: ICD-10-PCS | Mod: CPTII,S$GLB,, | Performed by: STUDENT IN AN ORGANIZED HEALTH CARE EDUCATION/TRAINING PROGRAM

## 2023-02-14 PROCEDURE — 99213 OFFICE O/P EST LOW 20 MIN: CPT | Mod: S$GLB,,, | Performed by: STUDENT IN AN ORGANIZED HEALTH CARE EDUCATION/TRAINING PROGRAM

## 2023-02-14 PROCEDURE — 3008F PR BODY MASS INDEX (BMI) DOCUMENTED: ICD-10-PCS | Mod: CPTII,S$GLB,, | Performed by: STUDENT IN AN ORGANIZED HEALTH CARE EDUCATION/TRAINING PROGRAM

## 2023-02-14 PROCEDURE — 99213 PR OFFICE/OUTPT VISIT, EST, LEVL III, 20-29 MIN: ICD-10-PCS | Mod: S$GLB,,, | Performed by: STUDENT IN AN ORGANIZED HEALTH CARE EDUCATION/TRAINING PROGRAM

## 2023-02-14 PROCEDURE — 3079F DIAST BP 80-89 MM HG: CPT | Mod: CPTII,S$GLB,, | Performed by: STUDENT IN AN ORGANIZED HEALTH CARE EDUCATION/TRAINING PROGRAM

## 2023-02-14 PROCEDURE — 3079F PR MOST RECENT DIASTOLIC BLOOD PRESSURE 80-89 MM HG: ICD-10-PCS | Mod: CPTII,S$GLB,, | Performed by: STUDENT IN AN ORGANIZED HEALTH CARE EDUCATION/TRAINING PROGRAM

## 2023-02-14 PROCEDURE — 99999 PR PBB SHADOW E&M-EST. PATIENT-LVL V: CPT | Mod: PBBFAC,,, | Performed by: STUDENT IN AN ORGANIZED HEALTH CARE EDUCATION/TRAINING PROGRAM

## 2023-02-14 PROCEDURE — 3008F BODY MASS INDEX DOCD: CPT | Mod: CPTII,S$GLB,, | Performed by: STUDENT IN AN ORGANIZED HEALTH CARE EDUCATION/TRAINING PROGRAM

## 2023-02-14 PROCEDURE — 3075F SYST BP GE 130 - 139MM HG: CPT | Mod: CPTII,S$GLB,, | Performed by: STUDENT IN AN ORGANIZED HEALTH CARE EDUCATION/TRAINING PROGRAM

## 2023-02-14 PROCEDURE — 99999 PR PBB SHADOW E&M-EST. PATIENT-LVL V: ICD-10-PCS | Mod: PBBFAC,,, | Performed by: STUDENT IN AN ORGANIZED HEALTH CARE EDUCATION/TRAINING PROGRAM

## 2023-02-14 RX ORDER — FAMOTIDINE 20 MG/1
20 TABLET, FILM COATED ORAL 2 TIMES DAILY
Qty: 60 TABLET | Refills: 1 | Status: ON HOLD | OUTPATIENT
Start: 2023-02-14 | End: 2024-01-04 | Stop reason: HOSPADM

## 2023-02-14 RX ORDER — EPINEPHRINE 0.3 MG/.3ML
1 INJECTION SUBCUTANEOUS ONCE
Qty: 1 EACH | Refills: 0 | Status: SHIPPED | OUTPATIENT
Start: 2023-02-14 | End: 2023-05-15

## 2023-02-15 ENCOUNTER — OFFICE VISIT (OUTPATIENT)
Dept: ALLERGY | Facility: CLINIC | Age: 63
End: 2023-02-15
Payer: COMMERCIAL

## 2023-02-15 VITALS — WEIGHT: 303.81 LBS | BODY MASS INDEX: 35.11 KG/M2

## 2023-02-15 DIAGNOSIS — L50.9 URTICARIA: Primary | ICD-10-CM

## 2023-02-15 DIAGNOSIS — Z91.013 SHELLFISH ALLERGY: ICD-10-CM

## 2023-02-15 DIAGNOSIS — L29.9 ITCHING: ICD-10-CM

## 2023-02-15 DIAGNOSIS — E07.9 THYROID DISEASE: ICD-10-CM

## 2023-02-15 PROCEDURE — 1159F PR MEDICATION LIST DOCUMENTED IN MEDICAL RECORD: ICD-10-PCS | Mod: CPTII,S$GLB,, | Performed by: STUDENT IN AN ORGANIZED HEALTH CARE EDUCATION/TRAINING PROGRAM

## 2023-02-15 PROCEDURE — 99204 OFFICE O/P NEW MOD 45 MIN: CPT | Mod: S$GLB,,, | Performed by: STUDENT IN AN ORGANIZED HEALTH CARE EDUCATION/TRAINING PROGRAM

## 2023-02-15 PROCEDURE — 3008F PR BODY MASS INDEX (BMI) DOCUMENTED: ICD-10-PCS | Mod: CPTII,S$GLB,, | Performed by: STUDENT IN AN ORGANIZED HEALTH CARE EDUCATION/TRAINING PROGRAM

## 2023-02-15 PROCEDURE — 1160F RVW MEDS BY RX/DR IN RCRD: CPT | Mod: CPTII,S$GLB,, | Performed by: STUDENT IN AN ORGANIZED HEALTH CARE EDUCATION/TRAINING PROGRAM

## 2023-02-15 PROCEDURE — 3008F BODY MASS INDEX DOCD: CPT | Mod: CPTII,S$GLB,, | Performed by: STUDENT IN AN ORGANIZED HEALTH CARE EDUCATION/TRAINING PROGRAM

## 2023-02-15 PROCEDURE — 1159F MED LIST DOCD IN RCRD: CPT | Mod: CPTII,S$GLB,, | Performed by: STUDENT IN AN ORGANIZED HEALTH CARE EDUCATION/TRAINING PROGRAM

## 2023-02-15 PROCEDURE — 99204 PR OFFICE/OUTPT VISIT, NEW, LEVL IV, 45-59 MIN: ICD-10-PCS | Mod: S$GLB,,, | Performed by: STUDENT IN AN ORGANIZED HEALTH CARE EDUCATION/TRAINING PROGRAM

## 2023-02-15 PROCEDURE — 99999 PR PBB SHADOW E&M-EST. PATIENT-LVL IV: ICD-10-PCS | Mod: PBBFAC,,, | Performed by: STUDENT IN AN ORGANIZED HEALTH CARE EDUCATION/TRAINING PROGRAM

## 2023-02-15 PROCEDURE — 1160F PR REVIEW ALL MEDS BY PRESCRIBER/CLIN PHARMACIST DOCUMENTED: ICD-10-PCS | Mod: CPTII,S$GLB,, | Performed by: STUDENT IN AN ORGANIZED HEALTH CARE EDUCATION/TRAINING PROGRAM

## 2023-02-15 PROCEDURE — 99999 PR PBB SHADOW E&M-EST. PATIENT-LVL IV: CPT | Mod: PBBFAC,,, | Performed by: STUDENT IN AN ORGANIZED HEALTH CARE EDUCATION/TRAINING PROGRAM

## 2023-02-15 RX ORDER — CETIRIZINE HYDROCHLORIDE 10 MG/1
10 TABLET ORAL
COMMUNITY
Start: 2023-02-05

## 2023-02-15 RX ORDER — CODEINE PHOSPHATE AND GUAIFENESIN 10; 100 MG/5ML; MG/5ML
SOLUTION ORAL
COMMUNITY
Start: 2023-01-21 | End: 2024-03-12

## 2023-02-15 RX ORDER — PREDNISONE 20 MG/1
40 TABLET ORAL
COMMUNITY
Start: 2023-02-05 | End: 2023-05-15

## 2023-02-15 RX ORDER — HYDROXYZINE PAMOATE 25 MG/1
25-50 CAPSULE ORAL EVERY 6 HOURS PRN
COMMUNITY
Start: 2023-02-05 | End: 2023-03-01 | Stop reason: ALTCHOICE

## 2023-02-15 NOTE — PROGRESS NOTES
Allergy Clinic Note  [facility]    This note was created by combination of typed  and M-Modal dictation. Transcription errors may be present.  If there are any questions, please contact me.    Subjective:      Patient ID: Zaheer Neal is a 62 y.o. male.    Chief Complaint: Allergies      Referring Provider: Ora Gonzalez    History of Present Illness: Zaheer Neal is a 62 y.o. male with second flare of hives in the last 6 years.    Related medications and other interventions  Zyrtec 10 mg in AM  Vistaril 25 mg TID prn == taking AM and HS  Benadryl 50 mg midday  Famotidine 20 mg twice daily -- not yet started    02/15/2023:  Client reports his hives started 01/22/2023.  He has no photographs but describes typical welts with itching.  There are no associated symptoms.  There are no clear precipitants.  He was seen in urgent care several times and received prednisone on 3 separate occasions.  Was also prescribed Zyrtec, Vistaril and famotidine.  He says that today is his 1st day being both itching hive free on Zyrtec 10 mg in the morning Vistaril 25 mg twice daily and Benadryl 50 mg mid day.  Given 3 week duration of hives along with normal history and physical, I am not recommending any diagnostic testing at present.  Recommend continuing current H1 blockers.  If needed to control symptoms, add famotidine.    Mr. Neal was seen by me with similar symptoms in 2018.  At that time, his hives appear to be waning.  No testing was ordered.  Hives resolved without further intervention in less than 2-3 months.    Since onset of this episode of hives, client...  Denies fever, shakes, or chills  Denies loss of weight, appetite, or energy level  Denies change in the texture of her hair, skin, or nails  Denies change in the appearance of urine or stool  Denies vomiting, diarrhea, constipation, or abdominal pain  Denies abnormal bleeding  Denies any new aches or pains, specifically  myalgias or arthralgia,   Denies any unusual moles  states prostate and colon urticaria cance screening is up-to-date         MEDICAL HISTORY      Significant past medical history:  Thyroid cancer now on replacement  Active Problem List reviewed  ENT surgery:  None   Exposures:  Dog(s).  No smoke or mold  Smoking Hx:  Client  reports that he has never smoked. He has never used smokeless tobacco.    Meds:  MAR reviewed    Asthma:  No  Eczema:  No  Rhinitis:  No  Drug allergy/intolerance:  NKDA  Venom allergy: No  Latex allergy:  No    Patient Active Problem List   Diagnosis    Peptic ulcer    Malignant neoplasm of thyroid gland    Post-surgical hypothyroidism    Personal history of allergy to shellfish    Obesity (BMI 30.0-34.9)    Hyperlipidemia    Trigger middle finger of right hand    Hypothyroidism (acquired)    Osteoarthritis of both knees    Benign prostatic hyperplasia         REVIEW OF SYSTEMS      CONST: no F/C/NS, no unintentional weight changes  NEURO:  no tremor, no weakness  EYES: no discharge, no pruritus, no erythema  EARS: no hearing loss, no sensation of fullness  NOSE: no congestion, no rhinorrhea, no sneezing  PULM:  no SOB, no wheezing, no cough  CV: no CP, no palpitations, no leg swelling  GI:  no abdominal pain, no blood in stool  :  no dysuria, no hematuria  DERM: + rashes, no skin breaks    Objective:     Physical Exam:     Wt (!) 137.8 kg (303 lb 12.7 oz)   BMI 35.11 kg/m²   GEN: Awake and alert, no distress  DERM: No flushing, no rashes present   EYE:  No occular discharge, no redness  HEENT: No nasal discharge, no hoarseness, TMs are clear bilaterally.  Nares are pink with no significant turbinate swelling.  Oropharynx is benign without exudate.  Tongue is not coated.  NECK:  FROM, no LAD, no thyromegaly  PULM: Normal work of breathing, no cough, CTA   COR:  RRR, normal pulses  ABD:  Nondistended, nontender, no mass  NEURO:  No focal deficit, speech fluent and logical  PSYCH: appropriate  affect, normal behavior  EXTREM:  No edema        Allergy Testing            Lab results            Imaging and other diagnostics            Medical records review          Medical Decision-Making   Mr Neal is presenting with a three-week history of urticaria.  There is no angioedema or anaphylaxis.  Based on initial history and physical, no etiology is evident.  He is currently hive free in itch free on a complicated regimen of 3 H1 blockers.  I recommend he continue.  If symptoms are not controlled, he can add the famotidine which has already been prescribed.  He is to contact me via portal if itching is not controlled and return to clinic if hives last total duration of more than 2-3 months.    Mr. Neal also has history of shrimp allergy, but it is unrelated to his current episode of hives.    Diagnoses:     Zaheer Neal is a 62 y.o. male. with  1. Urticaria    2. Itching    3. Shellfish allergy    4. Thyroid disease          Plan:   Urticaria -controlled on meds      -continue current management    Itching -ruled on meds        -if itching increases, ad famotidine 20 mg twice daily to current regimen    Shellfish allergy  -continue avoidance    Comorbidities:  Thyroid disease (post surgical hypothyroidism following cancer):  If hives last more than and 2-3 months, will check TSH and thyroid autoantibodies.      Patient Instructions and follow up     Patient Instructions       Continue current medicines.    It problems with hives or itching, add famotidine 20 mg twice a day    Return if hives last total of 2-3 months or more  Contact me via portal if itching is not controlled      Dede Chi MD  Allergy, Asthma & Immunology    I spent a total of 46 minutes on the day of the visit.This includes face to face time and non-face to face time preparing to see the patient (eg, review of tests), obtaining and/or reviewing separately obtained history, documenting clinical information in the electronic  or other health record, independently interpreting results and communicating results to the patient/family/caregiver, or care coordinator.

## 2023-02-15 NOTE — PATIENT INSTRUCTIONS
Continue current medicines.    It problems with hives or itching, add famotidine 20 mg twice a day    Return if hives last total of 2-3 months or more  Contact me via portal if itching is not controlled

## 2023-02-28 ENCOUNTER — PATIENT MESSAGE (OUTPATIENT)
Dept: ALLERGY | Facility: CLINIC | Age: 63
End: 2023-02-28
Payer: COMMERCIAL

## 2023-03-01 ENCOUNTER — LAB VISIT (OUTPATIENT)
Dept: LAB | Facility: HOSPITAL | Age: 63
End: 2023-03-01
Attending: STUDENT IN AN ORGANIZED HEALTH CARE EDUCATION/TRAINING PROGRAM
Payer: COMMERCIAL

## 2023-03-01 ENCOUNTER — DOCUMENTATION ONLY (OUTPATIENT)
Dept: ALLERGY | Facility: CLINIC | Age: 63
End: 2023-03-01
Payer: COMMERCIAL

## 2023-03-01 ENCOUNTER — OFFICE VISIT (OUTPATIENT)
Dept: ALLERGY | Facility: CLINIC | Age: 63
End: 2023-03-01
Payer: COMMERCIAL

## 2023-03-01 VITALS
HEART RATE: 80 BPM | WEIGHT: 301.81 LBS | OXYGEN SATURATION: 98 % | DIASTOLIC BLOOD PRESSURE: 80 MMHG | BODY MASS INDEX: 34.88 KG/M2 | SYSTOLIC BLOOD PRESSURE: 131 MMHG

## 2023-03-01 DIAGNOSIS — L50.9 URTICARIA: ICD-10-CM

## 2023-03-01 DIAGNOSIS — Z91.013 SHELLFISH ALLERGY: ICD-10-CM

## 2023-03-01 DIAGNOSIS — L29.9 ITCHING: ICD-10-CM

## 2023-03-01 DIAGNOSIS — T78.3XXA ANGIOEDEMA, INITIAL ENCOUNTER: ICD-10-CM

## 2023-03-01 DIAGNOSIS — E07.9 THYROID DISEASE: ICD-10-CM

## 2023-03-01 DIAGNOSIS — L50.9 URTICARIA: Primary | ICD-10-CM

## 2023-03-01 LAB
IGE SERPL-ACNC: 87 IU/ML (ref 0–100)
THYROGLOB AB SERPL IA-ACNC: <4 IU/ML (ref 0–3.9)
THYROPEROXIDASE IGG SERPL-ACNC: <6 IU/ML

## 2023-03-01 PROCEDURE — 3075F SYST BP GE 130 - 139MM HG: CPT | Mod: CPTII,S$GLB,, | Performed by: STUDENT IN AN ORGANIZED HEALTH CARE EDUCATION/TRAINING PROGRAM

## 2023-03-01 PROCEDURE — 1159F MED LIST DOCD IN RCRD: CPT | Mod: CPTII,S$GLB,, | Performed by: STUDENT IN AN ORGANIZED HEALTH CARE EDUCATION/TRAINING PROGRAM

## 2023-03-01 PROCEDURE — 84165 PATHOLOGIST INTERPRETATION SPE: ICD-10-PCS | Mod: 26,,, | Performed by: PATHOLOGY

## 2023-03-01 PROCEDURE — 1159F PR MEDICATION LIST DOCUMENTED IN MEDICAL RECORD: ICD-10-PCS | Mod: CPTII,S$GLB,, | Performed by: STUDENT IN AN ORGANIZED HEALTH CARE EDUCATION/TRAINING PROGRAM

## 2023-03-01 PROCEDURE — 82306 VITAMIN D 25 HYDROXY: CPT | Performed by: STUDENT IN AN ORGANIZED HEALTH CARE EDUCATION/TRAINING PROGRAM

## 2023-03-01 PROCEDURE — 99999 PR PBB SHADOW E&M-EST. PATIENT-LVL V: ICD-10-PCS | Mod: PBBFAC,,, | Performed by: STUDENT IN AN ORGANIZED HEALTH CARE EDUCATION/TRAINING PROGRAM

## 2023-03-01 PROCEDURE — 36415 COLL VENOUS BLD VENIPUNCTURE: CPT | Performed by: STUDENT IN AN ORGANIZED HEALTH CARE EDUCATION/TRAINING PROGRAM

## 2023-03-01 PROCEDURE — 3075F PR MOST RECENT SYSTOLIC BLOOD PRESS GE 130-139MM HG: ICD-10-PCS | Mod: CPTII,S$GLB,, | Performed by: STUDENT IN AN ORGANIZED HEALTH CARE EDUCATION/TRAINING PROGRAM

## 2023-03-01 PROCEDURE — 85025 COMPLETE CBC W/AUTO DIFF WBC: CPT | Performed by: STUDENT IN AN ORGANIZED HEALTH CARE EDUCATION/TRAINING PROGRAM

## 2023-03-01 PROCEDURE — 86376 MICROSOMAL ANTIBODY EACH: CPT | Performed by: STUDENT IN AN ORGANIZED HEALTH CARE EDUCATION/TRAINING PROGRAM

## 2023-03-01 PROCEDURE — 88184 FLOWCYTOMETRY/ TC 1 MARKER: CPT | Performed by: STUDENT IN AN ORGANIZED HEALTH CARE EDUCATION/TRAINING PROGRAM

## 2023-03-01 PROCEDURE — 3079F DIAST BP 80-89 MM HG: CPT | Mod: CPTII,S$GLB,, | Performed by: STUDENT IN AN ORGANIZED HEALTH CARE EDUCATION/TRAINING PROGRAM

## 2023-03-01 PROCEDURE — 3079F PR MOST RECENT DIASTOLIC BLOOD PRESSURE 80-89 MM HG: ICD-10-PCS | Mod: CPTII,S$GLB,, | Performed by: STUDENT IN AN ORGANIZED HEALTH CARE EDUCATION/TRAINING PROGRAM

## 2023-03-01 PROCEDURE — 99215 PR OFFICE/OUTPT VISIT, EST, LEVL V, 40-54 MIN: ICD-10-PCS | Mod: S$GLB,,, | Performed by: STUDENT IN AN ORGANIZED HEALTH CARE EDUCATION/TRAINING PROGRAM

## 2023-03-01 PROCEDURE — 1160F RVW MEDS BY RX/DR IN RCRD: CPT | Mod: CPTII,S$GLB,, | Performed by: STUDENT IN AN ORGANIZED HEALTH CARE EDUCATION/TRAINING PROGRAM

## 2023-03-01 PROCEDURE — 86160 COMPLEMENT ANTIGEN: CPT | Performed by: STUDENT IN AN ORGANIZED HEALTH CARE EDUCATION/TRAINING PROGRAM

## 2023-03-01 PROCEDURE — 86161 COMPLEMENT/FUNCTION ACTIVITY: CPT | Performed by: STUDENT IN AN ORGANIZED HEALTH CARE EDUCATION/TRAINING PROGRAM

## 2023-03-01 PROCEDURE — 86003 ALLG SPEC IGE CRUDE XTRC EA: CPT | Performed by: STUDENT IN AN ORGANIZED HEALTH CARE EDUCATION/TRAINING PROGRAM

## 2023-03-01 PROCEDURE — 84165 PROTEIN E-PHORESIS SERUM: CPT | Mod: 26,,, | Performed by: PATHOLOGY

## 2023-03-01 PROCEDURE — 3008F PR BODY MASS INDEX (BMI) DOCUMENTED: ICD-10-PCS | Mod: CPTII,S$GLB,, | Performed by: STUDENT IN AN ORGANIZED HEALTH CARE EDUCATION/TRAINING PROGRAM

## 2023-03-01 PROCEDURE — 86800 THYROGLOBULIN ANTIBODY: CPT | Performed by: STUDENT IN AN ORGANIZED HEALTH CARE EDUCATION/TRAINING PROGRAM

## 2023-03-01 PROCEDURE — 83520 IMMUNOASSAY QUANT NOS NONAB: CPT | Performed by: STUDENT IN AN ORGANIZED HEALTH CARE EDUCATION/TRAINING PROGRAM

## 2023-03-01 PROCEDURE — 99215 OFFICE O/P EST HI 40 MIN: CPT | Mod: S$GLB,,, | Performed by: STUDENT IN AN ORGANIZED HEALTH CARE EDUCATION/TRAINING PROGRAM

## 2023-03-01 PROCEDURE — 1160F PR REVIEW ALL MEDS BY PRESCRIBER/CLIN PHARMACIST DOCUMENTED: ICD-10-PCS | Mod: CPTII,S$GLB,, | Performed by: STUDENT IN AN ORGANIZED HEALTH CARE EDUCATION/TRAINING PROGRAM

## 2023-03-01 PROCEDURE — 3008F BODY MASS INDEX DOCD: CPT | Mod: CPTII,S$GLB,, | Performed by: STUDENT IN AN ORGANIZED HEALTH CARE EDUCATION/TRAINING PROGRAM

## 2023-03-01 PROCEDURE — 84443 ASSAY THYROID STIM HORMONE: CPT | Performed by: STUDENT IN AN ORGANIZED HEALTH CARE EDUCATION/TRAINING PROGRAM

## 2023-03-01 PROCEDURE — 82785 ASSAY OF IGE: CPT | Performed by: STUDENT IN AN ORGANIZED HEALTH CARE EDUCATION/TRAINING PROGRAM

## 2023-03-01 PROCEDURE — 80053 COMPREHEN METABOLIC PANEL: CPT | Performed by: STUDENT IN AN ORGANIZED HEALTH CARE EDUCATION/TRAINING PROGRAM

## 2023-03-01 PROCEDURE — 86003 ALLG SPEC IGE CRUDE XTRC EA: CPT | Mod: 59 | Performed by: STUDENT IN AN ORGANIZED HEALTH CARE EDUCATION/TRAINING PROGRAM

## 2023-03-01 PROCEDURE — 84165 PROTEIN E-PHORESIS SERUM: CPT | Performed by: STUDENT IN AN ORGANIZED HEALTH CARE EDUCATION/TRAINING PROGRAM

## 2023-03-01 PROCEDURE — 99999 PR PBB SHADOW E&M-EST. PATIENT-LVL V: CPT | Mod: PBBFAC,,, | Performed by: STUDENT IN AN ORGANIZED HEALTH CARE EDUCATION/TRAINING PROGRAM

## 2023-03-01 RX ORDER — HYDROXYZINE HYDROCHLORIDE 25 MG/1
TABLET, FILM COATED ORAL
Qty: 240 TABLET | Refills: 3 | Status: ON HOLD | OUTPATIENT
Start: 2023-03-01 | End: 2024-01-04 | Stop reason: HOSPADM

## 2023-03-01 RX ORDER — DOXYLAMINE SUCCINATE AND PHENYLEPHRINE HYDROCHLORIDE 10.5; 1 MG/1; MG/1
1 TABLET ORAL EVERY 8 HOURS PRN
COMMUNITY
Start: 2023-01-22 | End: 2024-03-12

## 2023-03-01 NOTE — PROGRESS NOTES
Allergy Clinic Note  [facility]    This note was created by combination of typed  and M-Modal dictation. Transcription errors may be present.  If there are any questions, please contact me.    Subjective:      Patient ID: Zaheer Neal is a 62 y.o. male.    Chief Complaint: Allergies and Urticaria (Patient has photos of Hives to show and here for a follow up )      Allergy problem list:    Subacute/chronic hives, 2nd episode  Shrimp allergy    History of Present Illness: Zaheer Neal is a 62 y.o. male with second flare of hives in the last 6 years. He was last seen 2/15/2023.  He returns today to follow up symptoms and discuss medications.    Related medications and other interventions  Zyrtec 10 mg in AM  Vistaril 25 mg TID prn == taking AM and HS  Benadryl 50 mg midday  Famotidine 20 mg twice daily midday and HS    3/1/23:  Mr. Neal reports return of hives and uncontrolled itching at nighttime.  This has despite adding famotidine twice daily to his regimen of 3 different H1 blockers as above.  He also reports an isolated episode of lower lip swelling starting early this morning.  Therapeutically, changed from Vistaril to Atarax and increased HS dosing and also added hydrocortisone 2.5% cream.  Return 2 weeks to follow-up tests.  Contact me sooner if itching not controlled    02/15/2023:  Client reports his hives started 01/22/2023.  He has no photographs but describes typical welts with itching.  There are no associated symptoms.  There are no clear precipitants.  He was seen in urgent care several times and received prednisone on 3 separate occasions.  Was also prescribed Zyrtec, Vistaril and famotidine.  He says that today is his 1st day being both itching hive free on Zyrtec 10 mg in the morning Vistaril 25 mg twice daily and Benadryl 50 mg mid day.  Given 3 week duration of hives along with normal history and physical, I am not recommending any diagnostic testing at  present.  Recommend continuing current H1 blockers.  If needed to control symptoms, add famotidine.    Mr. Neal was seen by me with similar symptoms in 2018.  At that time, his hives appear to be waning.  No testing was ordered.  Hives resolved without further intervention in less than 2-3 months.    Since onset of this episode of hives, client...  Denies fever, shakes, or chills  Denies loss of weight, appetite, or energy level  Denies change in the texture of her hair, skin, or nails  Denies change in the appearance of urine or stool  Denies vomiting, diarrhea, constipation, or abdominal pain  Denies abnormal bleeding  Denies any new aches or pains, specifically myalgias or arthralgia,   Denies any unusual moles  states prostate and colon cancer screening is up-to-date         MEDICAL HISTORY      Significant past medical history:  Thyroid cancer now on replacement  Active Problem List reviewed  ENT surgery:  None   Exposures:  Dog(s).  No smoke or mold  Smoking Hx:  Client  reports that he has never smoked. He has never used smokeless tobacco.    Meds:  MAR reviewed    Asthma:  No  Eczema:  No  Rhinitis:  No  Drug allergy/intolerance:  NKDA  Venom allergy: No  Latex allergy:  No    Patient Active Problem List   Diagnosis    Peptic ulcer    Malignant neoplasm of thyroid gland    Post-surgical hypothyroidism    Personal history of allergy to shellfish    Obesity (BMI 30.0-34.9)    Hyperlipidemia    Trigger middle finger of right hand    Hypothyroidism (acquired)    Osteoarthritis of both knees    Benign prostatic hyperplasia         REVIEW OF SYSTEMS      CONST: no F/C/NS, no unintentional weight changes  NEURO:  no tremor, no weakness  EYES: no discharge, no pruritus, no erythema  EARS: no hearing loss, no sensation of fullness  NOSE: no congestion, no rhinorrhea, no sneezing  PULM:  no SOB, no wheezing, no cough  CV: no CP, no palpitations, no leg swelling  GI:  no abdominal pain, no blood in stool  :  no  dysuria, no hematuria  DERM: + rashes, no skin breaks    Objective:     Physical Exam:     /80 (BP Location: Left arm, Patient Position: Sitting, BP Method: Medium (Automatic))   Pulse 80   Wt (!) 136.9 kg (301 lb 13 oz)   SpO2 98%   BMI 34.88 kg/m²   GEN: Awake and alert, no distress  DERM:  No flushing, no rashes  EYE:  No occular discharge, no redness  HEENT: No nasal discharge, no hoarseness  PULM: Normal work of breathing, no cough  NEURO:  No focal deficit, speech fluent and logical  PSYCH: appropriate affect, normal behavior        Allergy Testing            Lab results      Normal PSA, 2023  Normal TSH, 2022      Imaging and other diagnostics            Medical records review          Medical Decision-Making   Mr Neal is presenting with a 5 week history of urticaria including 1 mild episode of lip angioedema.  There is no anaphylaxis.  Based on initial history and physical, no etiology is evident.  Currently itching is not adequately controlled on a complex regimen of 3 H1 blockers and H2 blocker.  Adjusted medications as per patient instructions and added hydrocortisone cream. He is to contact me via portal if itching is not controlled and return to clinic in 2 weeks to f/u labs.    Mr. Neal also has history of shrimp allergy, but it is unrelated to his current episode of hives.  Will check interval Immunocaps.    Diagnoses:     Zaheer Neal is a 62 y.o. male. with  1. Urticaria    2. Itching    3. Shellfish allergy    4. Thyroid disease    5. Angioedema, initial encounter        Plan:       Urticaria  and Angioedema      -Adjust medications per instructions     - HC 2.5% cream 2-3 times a day as needed    Itching -nightime uncontrolled        Atarax 4-6 (100-150mg) at HS    Shellfish allergy  -check Immunocaps    Comorbidities:  Thyroid disease - check TSH and anti-thyroid antibodies    Patient Instructions and follow up     Patient Instructions       Testing  Blood work for  allergy testing and internal disease today       Check [facility] in one week for results or call [phone]       Contact me with questions or concerns       I will contact you if anything needs immediate attention.        Treatment    Stop  Vistaril hydroxyzine  Substitute Atarax hydroxyzine      AM:    1 zyrtec  2 hydroxyzine    Midday  2 benadryl  1 famotidine    Bedtime:    Atarax = hydroxizine 4-6 tablets   Causes drowsiness              Start with 4 tablets tonight              Increase or decrease by one tablet nightly until you find the best dose for you.  1 famotidine      In place of Gold Prince and other cream, recommend  2.5% hydrocortisone cream up to three times a day.      Dede Chi MD  Allergy, Asthma & Immunology    I spent a total of 40 minutes on the day of the visit.This includes face to face time and non-face to face time preparing to see the patient (eg, review of tests), obtaining and/or reviewing separately obtained history, documenting clinical information in the electronic or other health record, independently interpreting results and communicating results to the patient/family/caregiver, or care coordinator.

## 2023-03-01 NOTE — PROGRESS NOTES
# rash, presumed urticaria    Patient sent photos of his rash.  One of the photos clearly shows classic urticarial welts.

## 2023-03-01 NOTE — PATIENT INSTRUCTIONS
Testing  Blood work for allergy testing and internal disease today       Check [facility] in one week for results or call [phone]       Contact me with questions or concerns       I will contact you if anything needs immediate attention.        Treatment    Stop  Vistaril hydroxyzine  Substitute Atarax hydroxyzine      AM:    1 zyrtec  2 hydroxyzine    Midday  2 benadryl  1 famotidine    Bedtime:    Atarax = hydroxizine 4-6 tablets   Causes drowsiness              Start with 4 tablets tonight              Increase or decrease by one tablet nightly until you find the best dose for you.  1 famotidine      In place of Gold Prince and other cream, recommend  2.5% hydrocortisone cream up to three times a day.

## 2023-03-02 LAB
25(OH)D3+25(OH)D2 SERPL-MCNC: 27 NG/ML (ref 30–96)
ALBUMIN SERPL BCP-MCNC: 4.1 G/DL (ref 3.5–5.2)
ALBUMIN SERPL ELPH-MCNC: 4.28 G/DL (ref 3.35–5.55)
ALP SERPL-CCNC: 79 U/L (ref 55–135)
ALPHA1 GLOB SERPL ELPH-MCNC: 0.32 G/DL (ref 0.17–0.41)
ALPHA2 GLOB SERPL ELPH-MCNC: 0.56 G/DL (ref 0.43–0.99)
ALT SERPL W/O P-5'-P-CCNC: 23 U/L (ref 10–44)
ANION GAP SERPL CALC-SCNC: 9 MMOL/L (ref 8–16)
AST SERPL-CCNC: 22 U/L (ref 10–40)
B-GLOBULIN SERPL ELPH-MCNC: 0.97 G/DL (ref 0.5–1.1)
BASOPHILS # BLD AUTO: 0 K/UL (ref 0–0.2)
BASOPHILS NFR BLD: 0 % (ref 0–1.9)
BILIRUB SERPL-MCNC: 0.3 MG/DL (ref 0.1–1)
BUN SERPL-MCNC: 14 MG/DL (ref 8–23)
C4 SERPL-MCNC: 47 MG/DL (ref 11–44)
CALCIUM SERPL-MCNC: 9.3 MG/DL (ref 8.7–10.5)
CHLORIDE SERPL-SCNC: 106 MMOL/L (ref 95–110)
CO2 SERPL-SCNC: 25 MMOL/L (ref 23–29)
CREAT SERPL-MCNC: 1.1 MG/DL (ref 0.5–1.4)
DIFFERENTIAL METHOD: ABNORMAL
EOSINOPHIL # BLD AUTO: 0.1 K/UL (ref 0–0.5)
EOSINOPHIL NFR BLD: 3.2 % (ref 0–8)
ERYTHROCYTE [DISTWIDTH] IN BLOOD BY AUTOMATED COUNT: 13.6 % (ref 11.5–14.5)
EST. GFR  (NO RACE VARIABLE): >60 ML/MIN/1.73 M^2
GAMMA GLOB SERPL ELPH-MCNC: 1.07 G/DL (ref 0.67–1.58)
GLUCOSE SERPL-MCNC: 91 MG/DL (ref 70–110)
HCT VFR BLD AUTO: 40.3 % (ref 40–54)
HGB BLD-MCNC: 12.8 G/DL (ref 14–18)
IMM GRANULOCYTES # BLD AUTO: 0.01 K/UL (ref 0–0.04)
IMM GRANULOCYTES NFR BLD AUTO: 0.2 % (ref 0–0.5)
LYMPHOCYTES # BLD AUTO: 1.8 K/UL (ref 1–4.8)
LYMPHOCYTES NFR BLD: 43.3 % (ref 18–48)
MCH RBC QN AUTO: 29.1 PG (ref 27–31)
MCHC RBC AUTO-ENTMCNC: 31.8 G/DL (ref 32–36)
MCV RBC AUTO: 92 FL (ref 82–98)
MONOCYTES # BLD AUTO: 0.3 K/UL (ref 0.3–1)
MONOCYTES NFR BLD: 8.4 % (ref 4–15)
NEUTROPHILS # BLD AUTO: 1.8 K/UL (ref 1.8–7.7)
NEUTROPHILS NFR BLD: 44.9 % (ref 38–73)
NRBC BLD-RTO: 0 /100 WBC
PLATELET # BLD AUTO: 269 K/UL (ref 150–450)
PMV BLD AUTO: 10.8 FL (ref 9.2–12.9)
POTASSIUM SERPL-SCNC: 4.1 MMOL/L (ref 3.5–5.1)
PROT SERPL-MCNC: 7.2 G/DL (ref 6–8.4)
PROT SERPL-MCNC: 7.6 G/DL (ref 6–8.4)
RBC # BLD AUTO: 4.4 M/UL (ref 4.6–6.2)
SODIUM SERPL-SCNC: 140 MMOL/L (ref 136–145)
TSH SERPL DL<=0.005 MIU/L-ACNC: 0.64 UIU/ML (ref 0.4–4)
WBC # BLD AUTO: 4.06 K/UL (ref 3.9–12.7)

## 2023-03-03 LAB — TRYPTASE LEVEL: 5.4 NG/ML

## 2023-03-05 LAB — PATHOLOGIST INTERPRETATION SPE: NORMAL

## 2023-03-06 LAB
A ALTERNATA IGE QN: <0.1 KU/L
A FUMIGATUS IGE QN: <0.1 KU/L
ALLERGEN LATEX IGE: <0.1 KU/L
BERMUDA GRASS IGE QN: <0.1 KU/L
CAT DANDER IGE QN: <0.1 KU/L
CATFISH IGE QN: <0.1 KU/L
CEDAR IGE QN: 0.44 KU/L
CHICKPEA IGE AB [UNITS/VOLUME] IN SERUM: <0.1 KU/L
CLAM IGE QN: <0.1 KU/L
CODFISH IGE QN: <0.1 KU/L
CORN IGE QN: <0.1 KU/L
CRAB IGE QN: <0.1 KU/L
CRAWFISH IGE QN: <0.1 KU/L
D FARINAE IGE QN: 13.3 KU/L
D PTERONYSS IGE QN: 10.5 KU/L
DEPRECATED A ALTERNATA IGE RAST QL: NORMAL
DEPRECATED A FUMIGATUS IGE RAST QL: NORMAL
DEPRECATED BERMUDA GRASS IGE RAST QL: NORMAL
DEPRECATED CAT DANDER IGE RAST QL: NORMAL
DEPRECATED CATFISH IGE RAST QL: NORMAL
DEPRECATED CEDAR IGE RAST QL: ABNORMAL
DEPRECATED CHICK PEA IGE RAST QL: NORMAL
DEPRECATED CLAM IGE RAST QL: NORMAL
DEPRECATED CODFISH IGE RAST QL: NORMAL
DEPRECATED CORN IGE RAST QL: NORMAL
DEPRECATED CRAB IGE RAST QL: NORMAL
DEPRECATED CRAWFISH IGE RAST QL: NORMAL
DEPRECATED D FARINAE IGE RAST QL: ABNORMAL
DEPRECATED D PTERONYSS IGE RAST QL: ABNORMAL
DEPRECATED DOG DANDER IGE RAST QL: ABNORMAL
DEPRECATED ENGL PLANTAIN IGE RAST QL: NORMAL
DEPRECATED FLOUNDER IGE RAST QL: NORMAL
DEPRECATED LOBSTER IGE RAST QL: NORMAL
DEPRECATED OYSTER IGE RAST QL: NORMAL
DEPRECATED PACIFIC SQUID IGE RAST QL: NORMAL
DEPRECATED PECAN/HICK TREE IGE RAST QL: NORMAL
DEPRECATED ROACH IGE RAST QL: NORMAL
DEPRECATED SALMON IGE RAST QL: NORMAL
DEPRECATED SCALLOP IGE RAST QL: NORMAL
DEPRECATED SESAME SEED IGE RAST QL: NORMAL
DEPRECATED SHRIMP IGE RAST QL: NORMAL
DEPRECATED SOYBEAN IGE RAST QL: NORMAL
DEPRECATED SUNFLOWER SEED IGE RAST QL: NORMAL
DEPRECATED TILAPIA IGE RAST QL: NORMAL
DEPRECATED TIMOTHY IGE RAST QL: NORMAL
DEPRECATED TROUT IGE RAST QL: NORMAL
DEPRECATED TUNA IGE RAST QL: NORMAL
DEPRECATED WEST RAGWEED IGE RAST QL: NORMAL
DEPRECATED WHITE OAK IGE RAST QL: NORMAL
DOG DANDER IGE QN: 0.14 KU/L
ENGL PLANTAIN IGE QN: <0.1 KU/L
FLOUNDER IGE QN: <0.1 KU/L
LATEX CLASS: NORMAL
LOBSTER IGE QN: <0.1 KU/L
OYSTER IGE QN: <0.1 KU/L
PACIFIC SQUID IGE QN: <0.1 KU/L
PECAN/HICK TREE IGE QN: <0.1 KU/L
ROACH IGE QN: <0.1 KU/L
SALMON IGE QN: <0.1 KU/L
SCALLOP IGE QN: <0.1 KU/L
SESAME SEED IGE QN: <0.1 KU/L
SHRIMP IGE QN: <0.1 KU/L
SOYBEAN IGE QN: <0.1 KU/L
SUNFLOWER SEED IGE QN: <0.1 KU/L
TILAPIA IGE QN: <0.1 KU/L
TIMOTHY IGE QN: <0.1 KU/L
TROUT IGE QN: <0.1 KU/L
TUNA IGE QN: <0.1 KU/L
WEST RAGWEED IGE QN: <0.1 KU/L
WHITE OAK IGE QN: <0.1 KU/L

## 2023-03-09 LAB — C1INH FUNCTIONAL/C1INH TOTAL MFR SERPL: >100 %

## 2023-03-16 LAB
BASOPHILS %, CD203C: 8.5 % (ref 0–12)
IGE RECEPTOR AB INTERPRETATION:: NORMAL

## 2023-03-20 ENCOUNTER — PATIENT MESSAGE (OUTPATIENT)
Dept: INTERNAL MEDICINE | Facility: CLINIC | Age: 63
End: 2023-03-20
Payer: COMMERCIAL

## 2023-03-21 ENCOUNTER — PATIENT MESSAGE (OUTPATIENT)
Dept: INTERNAL MEDICINE | Facility: CLINIC | Age: 63
End: 2023-03-21
Payer: COMMERCIAL

## 2023-03-21 ENCOUNTER — OFFICE VISIT (OUTPATIENT)
Dept: INTERNAL MEDICINE | Facility: CLINIC | Age: 63
End: 2023-03-21
Payer: COMMERCIAL

## 2023-03-21 ENCOUNTER — TELEPHONE (OUTPATIENT)
Dept: ALLERGY | Facility: CLINIC | Age: 63
End: 2023-03-21
Payer: COMMERCIAL

## 2023-03-21 VITALS
WEIGHT: 303.81 LBS | OXYGEN SATURATION: 97 % | DIASTOLIC BLOOD PRESSURE: 84 MMHG | BODY MASS INDEX: 35.15 KG/M2 | HEIGHT: 78 IN | RESPIRATION RATE: 16 BRPM | HEART RATE: 78 BPM | SYSTOLIC BLOOD PRESSURE: 132 MMHG

## 2023-03-21 DIAGNOSIS — H93.11 TINNITUS OF RIGHT EAR: ICD-10-CM

## 2023-03-21 PROCEDURE — 1159F MED LIST DOCD IN RCRD: CPT | Mod: CPTII,S$GLB,, | Performed by: STUDENT IN AN ORGANIZED HEALTH CARE EDUCATION/TRAINING PROGRAM

## 2023-03-21 PROCEDURE — 3079F PR MOST RECENT DIASTOLIC BLOOD PRESSURE 80-89 MM HG: ICD-10-PCS | Mod: CPTII,S$GLB,, | Performed by: STUDENT IN AN ORGANIZED HEALTH CARE EDUCATION/TRAINING PROGRAM

## 2023-03-21 PROCEDURE — 99999 PR PBB SHADOW E&M-EST. PATIENT-LVL IV: ICD-10-PCS | Mod: PBBFAC,,, | Performed by: STUDENT IN AN ORGANIZED HEALTH CARE EDUCATION/TRAINING PROGRAM

## 2023-03-21 PROCEDURE — 99213 OFFICE O/P EST LOW 20 MIN: CPT | Mod: S$GLB,,, | Performed by: STUDENT IN AN ORGANIZED HEALTH CARE EDUCATION/TRAINING PROGRAM

## 2023-03-21 PROCEDURE — 1159F PR MEDICATION LIST DOCUMENTED IN MEDICAL RECORD: ICD-10-PCS | Mod: CPTII,S$GLB,, | Performed by: STUDENT IN AN ORGANIZED HEALTH CARE EDUCATION/TRAINING PROGRAM

## 2023-03-21 PROCEDURE — 3075F PR MOST RECENT SYSTOLIC BLOOD PRESS GE 130-139MM HG: ICD-10-PCS | Mod: CPTII,S$GLB,, | Performed by: STUDENT IN AN ORGANIZED HEALTH CARE EDUCATION/TRAINING PROGRAM

## 2023-03-21 PROCEDURE — 99999 PR PBB SHADOW E&M-EST. PATIENT-LVL IV: CPT | Mod: PBBFAC,,, | Performed by: STUDENT IN AN ORGANIZED HEALTH CARE EDUCATION/TRAINING PROGRAM

## 2023-03-21 PROCEDURE — 99213 PR OFFICE/OUTPT VISIT, EST, LEVL III, 20-29 MIN: ICD-10-PCS | Mod: S$GLB,,, | Performed by: STUDENT IN AN ORGANIZED HEALTH CARE EDUCATION/TRAINING PROGRAM

## 2023-03-21 PROCEDURE — 3008F BODY MASS INDEX DOCD: CPT | Mod: CPTII,S$GLB,, | Performed by: STUDENT IN AN ORGANIZED HEALTH CARE EDUCATION/TRAINING PROGRAM

## 2023-03-21 PROCEDURE — 3008F PR BODY MASS INDEX (BMI) DOCUMENTED: ICD-10-PCS | Mod: CPTII,S$GLB,, | Performed by: STUDENT IN AN ORGANIZED HEALTH CARE EDUCATION/TRAINING PROGRAM

## 2023-03-21 PROCEDURE — 3079F DIAST BP 80-89 MM HG: CPT | Mod: CPTII,S$GLB,, | Performed by: STUDENT IN AN ORGANIZED HEALTH CARE EDUCATION/TRAINING PROGRAM

## 2023-03-21 PROCEDURE — 3075F SYST BP GE 130 - 139MM HG: CPT | Mod: CPTII,S$GLB,, | Performed by: STUDENT IN AN ORGANIZED HEALTH CARE EDUCATION/TRAINING PROGRAM

## 2023-03-21 RX ORDER — FLUTICASONE PROPIONATE 50 MCG
1 SPRAY, SUSPENSION (ML) NASAL 2 TIMES DAILY
Qty: 16 G | Refills: 0 | Status: SHIPPED | OUTPATIENT
Start: 2023-03-21

## 2023-03-21 NOTE — PROGRESS NOTES
Subjective:       Patient ID: Zaheer Neal is a 62 y.o. male.    Chief Complaint: Tinnitus    Ringing in Ears:    Associated symptoms: Tinnitus.  No dizziness, no vertigo, no ear pain, no fever, no headaches and no rhinorrhea.No dizziness.  Patient is a 61 yo male here for evaluation of tinnitus in the right ear since Friday. Denies hearing loss or vertigo. He started having hives 7 weeks ago so he is being worked up for that and was started on a few new medications such as hydroxyzine, benadryl, famotidine and zyrtec that he has been taking daily. Denies URI symptoms such as nasal congestion, runny nose, ear pain, sore throat, headache. Had tinnitus In the past when he had an ear infection 4 yrs ago.says feels like crickets in his ear. Denies trauma. He has has a hx of thyroid cancer.     Review of Systems   Constitutional:  Negative for chills and fever.   HENT:  Positive for tinnitus. Negative for nasal congestion, ear pain, hearing loss, postnasal drip, rhinorrhea and sore throat.    Eyes:  Negative for photophobia and visual disturbance.   Respiratory:  Negative for cough, chest tightness and shortness of breath.    Cardiovascular:  Negative for chest pain.   Gastrointestinal:  Negative for abdominal pain, blood in stool, constipation, diarrhea, nausea and vomiting.   Genitourinary:  Negative for dysuria and hematuria.   Neurological:  Negative for dizziness, vertigo, tremors, speech difficulty, light-headedness and headaches.   Psychiatric/Behavioral:  Negative for confusion.        Objective:      Physical Exam  Constitutional:       Appearance: Normal appearance.   HENT:      Right Ear: Ear canal and external ear normal. There is no impacted cerumen.      Left Ear: Ear canal and external ear normal. There is no impacted cerumen.      Ears:      Comments: Hazy tympanic membrane     Mouth/Throat:      Mouth: Mucous membranes are moist.      Pharynx: Oropharynx is clear. No oropharyngeal exudate or  posterior oropharyngeal erythema.   Eyes:      Conjunctiva/sclera: Conjunctivae normal.      Pupils: Pupils are equal, round, and reactive to light.      Comments: No nystagmus   Cardiovascular:      Rate and Rhythm: Normal rate and regular rhythm.      Heart sounds: Normal heart sounds.   Pulmonary:      Effort: Pulmonary effort is normal. No respiratory distress.      Breath sounds: Normal breath sounds. No wheezing.   Lymphadenopathy:      Cervical: No cervical adenopathy.   Skin:     General: Skin is warm.   Neurological:      Mental Status: He is alert and oriented to person, place, and time.   Psychiatric:         Mood and Affect: Mood normal.         Behavior: Behavior normal.             Assessment and Plan:       1. Tinnitus of right ear  Assessment & Plan:  There is haziness of the TM BL. Could be from fluid build up. He is also on a few new medication. Cetirizine does have a low chance of causing this but none of them are ototoxic. Will treat with flonase first. If no improvement in a week, will have to look at removing his medications one by one. Since he does not have hearing loss, vestibular schwannoma is not on the top of my list right now. Asked him to also inform the doctor who is working him up for the hives about tinnitus.      Other orders  -     fluticasone propionate (FLONASE) 50 mcg/actuation nasal spray; 1 spray (50 mcg total) by Each Nostril route 2 (two) times a day.  Dispense: 16 g; Refill: 0

## 2023-03-21 NOTE — TELEPHONE ENCOUNTER
Good afternoon Dr. Chi,    Contacted patient and successfully relayed 's message regarding taking further steroids and also in regards his Itching.     Patient stated he still has the itching and he will allow it to take it's course.    Regards  Babak Zuniga MA.

## 2023-03-21 NOTE — ASSESSMENT & PLAN NOTE
There is haziness of the TM BL. Could be from fluid build up. He is also on a few new medication. Cetirizine does have a low chance of causing this but none of them are ototoxic. Will treat with flonase first. If no improvement in a week, will have to look at removing his medications one by one. Since he does not have hearing loss, vestibular schwannoma is not on the top of my list right now. Asked him to also inform the doctor who is working him up for the hives about tinnitus.

## 2023-03-21 NOTE — TELEPHONE ENCOUNTER
"Called ans poke to pt. Pt c/o ringing in ears and an "off balance feeling". Pt also being worked up for hives by allergy. Pt denies CO, dyspnea, HA, or weakness. Pt requesting same day appt. Pt scheduled.  "

## 2023-03-24 ENCOUNTER — PATIENT MESSAGE (OUTPATIENT)
Dept: ALLERGY | Facility: CLINIC | Age: 63
End: 2023-03-24
Payer: COMMERCIAL

## 2023-03-27 NOTE — PROGRESS NOTES
Allergy Clinic Note  [facility]    This note was created by combination of typed  and M-Modal dictation. Transcription errors may be present.  If there are any questions, please contact me.    Subjective:      Patient ID: Zaheer Neal is a 62 y.o. male.    Chief Complaint: Follow-up (Follow up for Hives and to discuss Lab results. )      Allergy problem list:    Subacute/chronic hives, 2nd episode  Shrimp allergy    History of Present Illness: Zaheer Neal is a 62 y.o. male with second flare of hives in the last 6 years. He was last seen 2/15/2023.  He returns today to follow up symptoms and discuss medications.    Related medications and other interventions  Zyrtec 10 mg in AM  Benadryl 50 mg midday  Famotidine 20 mg twice daily midday and HS  Atarax 2 tablets in a.m. and 4-6 tablets HS  Hydrocortisone 2.5% cream up to t.i.d.    3/28/23:  Mr. Neal continues to have outbreaks of hives on most days.  Some outbreaks are severe and some very mild.  This has despite stated adherence with a complicated antihistamine regimen above.  He asked if he can try Allegra hives OTC product in lieu of Zyrtec and if he can take additional Atarax during the daytime.    3/1/23:  Mr. Neal reports return of hives and uncontrolled itching at nighttime.  This has despite adding famotidine twice daily to his regimen of 3 different H1 blockers as above.  He also reports an isolated episode of lower lip swelling starting early this morning.  Therapeutically, changed from Vistaril to Atarax and increased HS dosing and also added hydrocortisone 2.5% cream.  Return 2 weeks to follow-up tests.  Contact me sooner if itching not controlled    02/15/2023:  Client reports his hives started 01/22/2023.  He has no photographs but describes typical welts with itching.  There are no associated symptoms.  There are no clear precipitants.  He was seen in urgent care several times and received prednisone on 3  separate occasions.  Was also prescribed Zyrtec, Vistaril and famotidine.  He says that today is his 1st day being both itching hive free on Zyrtec 10 mg in the morning Vistaril 25 mg twice daily and Benadryl 50 mg mid day.  Given 3 week duration of hives along with normal history and physical, I am not recommending any diagnostic testing at present.  Recommend continuing current H1 blockers.  If needed to control symptoms, add famotidine.    Mr. Neal was seen by me with similar symptoms in 2018.  At that time, his hives appear to be waning.  No testing was ordered.  Hives resolved without further intervention in less than 2-3 months.    Since onset of this episode of hives, client...  Denies fever, shakes, or chills  Denies loss of weight, appetite, or energy level  Denies change in the texture of her hair, skin, or nails  Denies change in the appearance of urine or stool  Denies vomiting, diarrhea, constipation, or abdominal pain  Denies abnormal bleeding  Denies any new aches or pains, specifically myalgias or arthralgia,   Denies any unusual moles  states prostate and colon cancer screening is up-to-date         MEDICAL HISTORY      Significant past medical history:  Thyroid cancer now on replacement  Active Problem List reviewed  ENT surgery:  None   Exposures:  Dog(s).  No smoke or mold  Smoking Hx:  Client  reports that he has never smoked. He has never used smokeless tobacco.    Meds:  MAR reviewed    Asthma:  No  Eczema:  No  Rhinitis:  No  Drug allergy/intolerance:  NKDA  Venom allergy: No  Latex allergy:  No    Patient Active Problem List   Diagnosis    Peptic ulcer    Malignant neoplasm of thyroid gland    Post-surgical hypothyroidism    Personal history of allergy to shellfish    Obesity (BMI 30.0-34.9)    Hyperlipidemia    Trigger middle finger of right hand    Hypothyroidism (acquired)    Osteoarthritis of both knees    Benign prostatic hyperplasia    Tinnitus of right ear         REVIEW OF SYSTEMS       CONST: no F/C/NS, no unintentional weight changes  NEURO:  no tremor, no weakness  EYES: no discharge, no pruritus, no erythema  EARS: no hearing loss, no sensation of fullness  NOSE: no congestion, no rhinorrhea, no sneezing  PULM:  no SOB, no wheezing, no cough  CV: no CP, no palpitations, no leg swelling  GI:  no abdominal pain, no blood in stool  :  no dysuria, no hematuria  DERM: + rashes, no skin breaks    Objective:     Physical Exam:     /71 (BP Location: Right arm, Patient Position: Sitting, BP Method: Medium (Manual))   Pulse 75   Wt (!) 137.7 kg (303 lb 9.2 oz)   SpO2 97%   BMI 35.08 kg/m²   GEN: Awake and alert, no distress  DERM:  No flushing, no rashes  EYE:  No occular discharge, no redness  HEENT: No nasal discharge, no hoarseness  PULM: Normal work of breathing, no cough  NEURO:  No focal deficit, speech fluent and logical  PSYCH: appropriate affect, normal behavior        Allergy Testing      Immunocap positive to dust mite, 2023  Selected food Immunocap negative, 2023      Lab results      Normal PSA, 2023  Normal TSH, 2022  Total IgE 87, 2023  CBC significant for hemoglobin 12.8 with normocytic indices  CMP unremarkable   Normal TSH with negative antithyroid antibodies  Normal C1 esterase inhibitor function  Normal SPEP  Normal tryptase      Imaging and other diagnostics            Medical records review          Medical Decision-Making     Diagnoses:     Zaheer Neal is a 62 y.o. male. with  1. Urticaria    2. Angioedema, initial encounter    3. Itching    4. Thyroid disease    5. Anemia, unspecified type    6. Tinnitus of right ear          Plan:   Mr Neal is presenting with a several week history of urticaria including 1 mild episode of lip angioedema.  There is no anaphylaxis.  Based on initial history, physical, and labs, no etiology is evident.  Labs are significant for mild anemia (evidently chronic) and for dust sensitivity Currently itching is not  adequately controlled on a complex regimen of 3 H1 blockers and H2 blocker.  Medication alternatives as per patient instructions.  Also recommended modified dust avoidance measures.  He is instructed to return to see me if hives are still present in 4 months.    Mr. Neal also has history of shrimp allergy now with negative immuno caps.    For his tinnitus and vague this has equilibrium, recommended continuing Flonase for at least another week.  If no improvement, see ENT.    Patient Instructions and follow up     Patient Instructions   Serious causes of hives have been eliminated.  Allergic to dust    Recommend these dust avoidance measures:  Dust proof covers pillows   No stuffed animals or extra pillows on the bed at night.  No feathers or down on the bed  Wash bedding in hot water  Consider dust proof cover on mattress.    The best place to obtain dust proof covers is on-line at [employer] ([employer]).  If you prefer to buy locally, I recommend [employer].    I also recommend a multivitamin for your mildly decreased vitamin D      For itching..  Yes, you can try Allegrra Hives in place of zyrtec/cetirizine  Yes, you can try additional Atarax during the daytime, up to a maximum of 8 tablets in a day.    For ringing in your ear  Take Flonase 2 squirts each side twice daily for at least another week  If no improvement, see ENT      Return to see me if still having hives in 4-6 months            Dede Chi MD  Allergy, Asthma & Immunology

## 2023-03-28 ENCOUNTER — OFFICE VISIT (OUTPATIENT)
Dept: ALLERGY | Facility: CLINIC | Age: 63
End: 2023-03-28
Payer: COMMERCIAL

## 2023-03-28 ENCOUNTER — PATIENT MESSAGE (OUTPATIENT)
Dept: OTOLARYNGOLOGY | Facility: CLINIC | Age: 63
End: 2023-03-28
Payer: COMMERCIAL

## 2023-03-28 VITALS
BODY MASS INDEX: 35.08 KG/M2 | WEIGHT: 303.56 LBS | OXYGEN SATURATION: 97 % | HEART RATE: 75 BPM | DIASTOLIC BLOOD PRESSURE: 71 MMHG | SYSTOLIC BLOOD PRESSURE: 134 MMHG

## 2023-03-28 DIAGNOSIS — L29.9 ITCHING: ICD-10-CM

## 2023-03-28 DIAGNOSIS — H93.11 TINNITUS OF RIGHT EAR: ICD-10-CM

## 2023-03-28 DIAGNOSIS — E07.9 THYROID DISEASE: ICD-10-CM

## 2023-03-28 DIAGNOSIS — D64.9 ANEMIA, UNSPECIFIED TYPE: ICD-10-CM

## 2023-03-28 DIAGNOSIS — T78.3XXA ANGIOEDEMA, INITIAL ENCOUNTER: ICD-10-CM

## 2023-03-28 DIAGNOSIS — L50.9 URTICARIA: Primary | ICD-10-CM

## 2023-03-28 PROCEDURE — 99999 PR PBB SHADOW E&M-EST. PATIENT-LVL IV: ICD-10-PCS | Mod: PBBFAC,,, | Performed by: STUDENT IN AN ORGANIZED HEALTH CARE EDUCATION/TRAINING PROGRAM

## 2023-03-28 PROCEDURE — 99214 OFFICE O/P EST MOD 30 MIN: CPT | Mod: S$GLB,,, | Performed by: STUDENT IN AN ORGANIZED HEALTH CARE EDUCATION/TRAINING PROGRAM

## 2023-03-28 PROCEDURE — 99214 PR OFFICE/OUTPT VISIT, EST, LEVL IV, 30-39 MIN: ICD-10-PCS | Mod: S$GLB,,, | Performed by: STUDENT IN AN ORGANIZED HEALTH CARE EDUCATION/TRAINING PROGRAM

## 2023-03-28 PROCEDURE — 3008F BODY MASS INDEX DOCD: CPT | Mod: CPTII,S$GLB,, | Performed by: STUDENT IN AN ORGANIZED HEALTH CARE EDUCATION/TRAINING PROGRAM

## 2023-03-28 PROCEDURE — 1159F MED LIST DOCD IN RCRD: CPT | Mod: CPTII,S$GLB,, | Performed by: STUDENT IN AN ORGANIZED HEALTH CARE EDUCATION/TRAINING PROGRAM

## 2023-03-28 PROCEDURE — 1159F PR MEDICATION LIST DOCUMENTED IN MEDICAL RECORD: ICD-10-PCS | Mod: CPTII,S$GLB,, | Performed by: STUDENT IN AN ORGANIZED HEALTH CARE EDUCATION/TRAINING PROGRAM

## 2023-03-28 PROCEDURE — 3078F PR MOST RECENT DIASTOLIC BLOOD PRESSURE < 80 MM HG: ICD-10-PCS | Mod: CPTII,S$GLB,, | Performed by: STUDENT IN AN ORGANIZED HEALTH CARE EDUCATION/TRAINING PROGRAM

## 2023-03-28 PROCEDURE — 3075F SYST BP GE 130 - 139MM HG: CPT | Mod: CPTII,S$GLB,, | Performed by: STUDENT IN AN ORGANIZED HEALTH CARE EDUCATION/TRAINING PROGRAM

## 2023-03-28 PROCEDURE — 1160F PR REVIEW ALL MEDS BY PRESCRIBER/CLIN PHARMACIST DOCUMENTED: ICD-10-PCS | Mod: CPTII,S$GLB,, | Performed by: STUDENT IN AN ORGANIZED HEALTH CARE EDUCATION/TRAINING PROGRAM

## 2023-03-28 PROCEDURE — 3075F PR MOST RECENT SYSTOLIC BLOOD PRESS GE 130-139MM HG: ICD-10-PCS | Mod: CPTII,S$GLB,, | Performed by: STUDENT IN AN ORGANIZED HEALTH CARE EDUCATION/TRAINING PROGRAM

## 2023-03-28 PROCEDURE — 3008F PR BODY MASS INDEX (BMI) DOCUMENTED: ICD-10-PCS | Mod: CPTII,S$GLB,, | Performed by: STUDENT IN AN ORGANIZED HEALTH CARE EDUCATION/TRAINING PROGRAM

## 2023-03-28 PROCEDURE — 3078F DIAST BP <80 MM HG: CPT | Mod: CPTII,S$GLB,, | Performed by: STUDENT IN AN ORGANIZED HEALTH CARE EDUCATION/TRAINING PROGRAM

## 2023-03-28 PROCEDURE — 99999 PR PBB SHADOW E&M-EST. PATIENT-LVL IV: CPT | Mod: PBBFAC,,, | Performed by: STUDENT IN AN ORGANIZED HEALTH CARE EDUCATION/TRAINING PROGRAM

## 2023-03-28 PROCEDURE — 1160F RVW MEDS BY RX/DR IN RCRD: CPT | Mod: CPTII,S$GLB,, | Performed by: STUDENT IN AN ORGANIZED HEALTH CARE EDUCATION/TRAINING PROGRAM

## 2023-03-28 NOTE — PATIENT INSTRUCTIONS
Serious causes of hives have been eliminated.  Allergic to dust    Recommend these dust avoidance measures:  Dust proof covers pillows   No stuffed animals or extra pillows on the bed at night.  No feathers or down on the bed  Wash bedding in hot water  Consider dust proof cover on mattress.    The best place to obtain dust proof covers is on-line at [employer] ([employer]).  If you prefer to buy locally, I recommend [employer].    I also recommend a multivitamin for your mildly decreased vitamin D      For itching..  Yes, you can try Allegrra Hives in place of zyrtec/cetirizine  Yes, you can try additional Atarax during the daytime, up to a maximum of 8 tablets in a day.    For ringing in your ear  Take Flonase 2 squirts each side twice daily for at least another week  If no improvement, see ENT      Return to see me if still having hives in 4-6 months

## 2023-04-05 ENCOUNTER — PATIENT MESSAGE (OUTPATIENT)
Dept: INTERNAL MEDICINE | Facility: CLINIC | Age: 63
End: 2023-04-05
Payer: COMMERCIAL

## 2023-04-05 DIAGNOSIS — R42 VERTIGO: ICD-10-CM

## 2023-04-05 DIAGNOSIS — H93.19 TINNITUS, UNSPECIFIED LATERALITY: Primary | ICD-10-CM

## 2023-04-06 NOTE — TELEPHONE ENCOUNTER
Called and spoke to pt. Pt sates she has been dealing with tinnitus onset 3/19. Pt states he saw ENT Dr. Crawford on 4/4. Pt states he was not given any medication or given any solution for the tinnitus.  Pt also c/o hives since 1/20. Pt states he saw allergist Dr Chi on 3/28. Pt prescribed famotidine, hydroxyzine, Zyrtec and Benadryl. Pt states he had some relief with meds but never went away. Pt told to return to clinic if hives do not improve in 4-6 months.  Pt states he is still experiencing the hives, tinnitus and feeling off balance. States he feels like he needs further evaluation or to see a specialist such as neurology. Any further suggestions?

## 2023-04-06 NOTE — TELEPHONE ENCOUNTER
Patient reports acute vertigo and tinnitus, 2 weeks ago.  Has not seen ENT, was evaluated in primary care clinic.    I recommend urgent ENT consult.  Orders placed.  Please schedule within the next day or 2 with ENT, any location, 1st available.    Escalate to department administration if necessary, thank you

## 2023-04-08 ENCOUNTER — PATIENT MESSAGE (OUTPATIENT)
Dept: INTERNAL MEDICINE | Facility: CLINIC | Age: 63
End: 2023-04-08
Payer: COMMERCIAL

## 2023-05-10 NOTE — PROGRESS NOTES
Subjective:      Patient ID: Zaheer Neal is a 63 y.o. male.    Chief Complaint:  No chief complaint on file.    History of Present Illness  Zaheer Neal is here for follow up of postsurgical hypothyroidism.  Previously seen by me 5/2022.    1/2023 started with hives - unclear etiology. He reports he had this issue in the past and it improved when he switched from generic to brand name Synthroid.     With regards to hypothyroidism:    Thyroid cancer 12/2011 diagnosed in [place].   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  Thyroid gland is surgically absent. Neither surgical bed has evidence of residual/recurrent thyroid tissue.  2.  Real-time survey of the bilateral central and lateral neck did not reveal any abnormal appearing lymph nodes.  RECOMMENDATIONS:  Repeat surveillance ultrasound as dictated by thyroglobulin titers.    Current Medication:  Synthroid 150mcg daily     Recently started iron supplement - 4 hours from Synthroid.     Biotin Use: Denies    Takes thyroid medication properly without food first thing in the morning.    Current Symptoms:   Reports skin changes   Denies unexplained weight gain  Denies fatigue   Denies Constipation   Reports Hair loss  Denies Brittle nails  Denies Mental fog   Denies cp, palpations or sob  Denies Heat intolerance  Denies Cold intolerance    With regards to Vitamin D Deficiency:    Vit D, 25-Hydroxy   Date Value Ref Range Status   03/01/2023 27 (L) 30 - 96 ng/mL Final     Comment:     Vitamin D deficiency.........<10 ng/mL                              Vitamin D insufficiency......10-29 ng/mL       Vitamin D  "sufficiency........> or equal to 30 ng/mL  Vitamin D toxicity............>100 ng/mL         Current Meds: OTC Vit D3 2000iu daily     Review of Systems   as above    Objective:   Physical Exam  Vitals reviewed.   Cardiovascular:      Rate and Rhythm: Normal rate.      Comments: No edema present  Pulmonary:      Effort: Pulmonary effort is normal.   Abdominal:      Palpations: Abdomen is soft.     Visit Vitals  Pulse 84   Ht 6' 6" (1.981 m)   Wt 135.2 kg (298 lb 2.8 oz)   SpO2 97%   BMI 34.46 kg/m²       Body mass index is 34.46 kg/m².    Lab Review:   Lab Results   Component Value Date    HGBA1C 5.7 (H) 06/16/2022     Lab Results   Component Value Date    CHOL 258 (H) 05/15/2023    HDL 43 05/15/2023    LDLCALC 167.4 (H) 05/15/2023    TRIG 238 (H) 05/15/2023    CHOLHDL 16.7 (L) 05/15/2023     Lab Results   Component Value Date     05/15/2023    K 4.6 05/15/2023     05/15/2023    CO2 30 (H) 05/15/2023    GLU 92 05/15/2023    BUN 15 05/15/2023    CREATININE 1.1 05/15/2023    CALCIUM 9.8 05/15/2023    PROT 7.7 05/15/2023    ALBUMIN 4.1 05/15/2023    BILITOT 0.3 05/15/2023    ALKPHOS 90 05/15/2023    AST 36 05/15/2023    ALT 62 (H) 05/15/2023    ANIONGAP 8 05/15/2023    ESTGFRAFRICA >60.0 06/13/2022    EGFRNONAA >60.0 06/13/2022    TSH 0.643 03/01/2023     Vit D, 25-Hydroxy   Date Value Ref Range Status   03/01/2023 27 (L) 30 - 96 ng/mL Final     Comment:     Vitamin D deficiency.........<10 ng/mL                              Vitamin D insufficiency......10-29 ng/mL       Vitamin D sufficiency........> or equal to 30 ng/mL  Vitamin D toxicity............>100 ng/mL       Assessment and Plan     1. Post-surgical hypothyroidism  TIROSINT 150 mcg Cap    Hemoglobin A1C    TSH    Thyroglobulin    Vitamin D    US Soft Tissue Head Neck Thyroid      2. Malignant neoplasm of thyroid gland  TIROSINT 150 mcg Cap    Hemoglobin A1C    TSH    Thyroglobulin    Vitamin D    US Soft Tissue Head Neck Thyroid    "     Post-surgical hypothyroidism  -- Labs in 8 weeks.  -- Trial of Tirosint.  -- Medication Changes:   CONTINUE: Tirosint 150mcg daily.  -- Goal is a low normal TSH.  -- Avoid exogenous hyperthyroidism as this can accelerate bone loss and increase risk of CV complications.  -- Advised to take LT4 on an empty stomach with water and to wait 30-45 minutes before eating or taking other medications   -- Reviewed usual times of thyroid hormone changes  -- Reviewed that symptoms of hypothyroidism may not correlate with tsh, and a normal TSH is the goal of therapy. Symptoms are not a justification for over treatment.    Malignant neoplasm of thyroid gland  -- Tg and thyroid US due now.    Follow up in about 1 year (around 5/17/2024).

## 2023-05-15 ENCOUNTER — LAB VISIT (OUTPATIENT)
Dept: LAB | Facility: HOSPITAL | Age: 63
End: 2023-05-15
Attending: FAMILY MEDICINE
Payer: COMMERCIAL

## 2023-05-15 ENCOUNTER — OFFICE VISIT (OUTPATIENT)
Dept: INTERNAL MEDICINE | Facility: CLINIC | Age: 63
End: 2023-05-15
Attending: FAMILY MEDICINE
Payer: COMMERCIAL

## 2023-05-15 VITALS
OXYGEN SATURATION: 97 % | HEART RATE: 74 BPM | HEIGHT: 78 IN | BODY MASS INDEX: 33.67 KG/M2 | DIASTOLIC BLOOD PRESSURE: 70 MMHG | TEMPERATURE: 98 F | WEIGHT: 291 LBS | SYSTOLIC BLOOD PRESSURE: 118 MMHG

## 2023-05-15 DIAGNOSIS — C73 MALIGNANT NEOPLASM OF THYROID GLAND: ICD-10-CM

## 2023-05-15 DIAGNOSIS — Z12.5 PROSTATE CANCER SCREENING: ICD-10-CM

## 2023-05-15 DIAGNOSIS — Z00.00 ANNUAL PHYSICAL EXAM: ICD-10-CM

## 2023-05-15 DIAGNOSIS — H93.13 TINNITUS OF BOTH EARS: ICD-10-CM

## 2023-05-15 DIAGNOSIS — E78.5 HYPERLIPIDEMIA, UNSPECIFIED HYPERLIPIDEMIA TYPE: ICD-10-CM

## 2023-05-15 DIAGNOSIS — N52.9 ERECTILE DYSFUNCTION, UNSPECIFIED ERECTILE DYSFUNCTION TYPE: ICD-10-CM

## 2023-05-15 DIAGNOSIS — Z00.00 ANNUAL PHYSICAL EXAM: Primary | ICD-10-CM

## 2023-05-15 DIAGNOSIS — E03.9 HYPOTHYROIDISM (ACQUIRED): ICD-10-CM

## 2023-05-15 DIAGNOSIS — Z12.11 COLON CANCER SCREENING: ICD-10-CM

## 2023-05-15 DIAGNOSIS — Z91.89 FRAMINGHAM CARDIAC RISK 10-20% IN NEXT 10 YEARS: ICD-10-CM

## 2023-05-15 LAB
ALBUMIN SERPL BCP-MCNC: 4.1 G/DL (ref 3.5–5.2)
ALP SERPL-CCNC: 90 U/L (ref 55–135)
ALT SERPL W/O P-5'-P-CCNC: 62 U/L (ref 10–44)
ANION GAP SERPL CALC-SCNC: 8 MMOL/L (ref 8–16)
AST SERPL-CCNC: 36 U/L (ref 10–40)
BILIRUB SERPL-MCNC: 0.3 MG/DL (ref 0.1–1)
BUN SERPL-MCNC: 15 MG/DL (ref 8–23)
CALCIUM SERPL-MCNC: 9.8 MG/DL (ref 8.7–10.5)
CHLORIDE SERPL-SCNC: 103 MMOL/L (ref 95–110)
CHOLEST SERPL-MCNC: 258 MG/DL (ref 120–199)
CHOLEST/HDLC SERPL: 6 {RATIO} (ref 2–5)
CO2 SERPL-SCNC: 30 MMOL/L (ref 23–29)
COMPLEXED PSA SERPL-MCNC: 1.5 NG/ML (ref 0–4)
CREAT SERPL-MCNC: 1.1 MG/DL (ref 0.5–1.4)
EST. GFR  (NO RACE VARIABLE): >60 ML/MIN/1.73 M^2
GLUCOSE SERPL-MCNC: 92 MG/DL (ref 70–110)
HDLC SERPL-MCNC: 43 MG/DL (ref 40–75)
HDLC SERPL: 16.7 % (ref 20–50)
LDLC SERPL CALC-MCNC: 167.4 MG/DL (ref 63–159)
NONHDLC SERPL-MCNC: 215 MG/DL
POTASSIUM SERPL-SCNC: 4.6 MMOL/L (ref 3.5–5.1)
PROT SERPL-MCNC: 7.7 G/DL (ref 6–8.4)
SODIUM SERPL-SCNC: 141 MMOL/L (ref 136–145)
TRIGL SERPL-MCNC: 238 MG/DL (ref 30–150)

## 2023-05-15 PROCEDURE — 3074F PR MOST RECENT SYSTOLIC BLOOD PRESSURE < 130 MM HG: ICD-10-PCS | Mod: CPTII,S$GLB,, | Performed by: FAMILY MEDICINE

## 2023-05-15 PROCEDURE — 1159F MED LIST DOCD IN RCRD: CPT | Mod: CPTII,S$GLB,, | Performed by: FAMILY MEDICINE

## 2023-05-15 PROCEDURE — 36415 COLL VENOUS BLD VENIPUNCTURE: CPT | Performed by: FAMILY MEDICINE

## 2023-05-15 PROCEDURE — 80061 LIPID PANEL: CPT | Performed by: FAMILY MEDICINE

## 2023-05-15 PROCEDURE — 99999 PR PBB SHADOW E&M-EST. PATIENT-LVL V: ICD-10-PCS | Mod: PBBFAC,,, | Performed by: FAMILY MEDICINE

## 2023-05-15 PROCEDURE — 99396 PREV VISIT EST AGE 40-64: CPT | Mod: S$GLB,,, | Performed by: FAMILY MEDICINE

## 2023-05-15 PROCEDURE — 1159F PR MEDICATION LIST DOCUMENTED IN MEDICAL RECORD: ICD-10-PCS | Mod: CPTII,S$GLB,, | Performed by: FAMILY MEDICINE

## 2023-05-15 PROCEDURE — 3078F PR MOST RECENT DIASTOLIC BLOOD PRESSURE < 80 MM HG: ICD-10-PCS | Mod: CPTII,S$GLB,, | Performed by: FAMILY MEDICINE

## 2023-05-15 PROCEDURE — 1160F PR REVIEW ALL MEDS BY PRESCRIBER/CLIN PHARMACIST DOCUMENTED: ICD-10-PCS | Mod: CPTII,S$GLB,, | Performed by: FAMILY MEDICINE

## 2023-05-15 PROCEDURE — 1160F RVW MEDS BY RX/DR IN RCRD: CPT | Mod: CPTII,S$GLB,, | Performed by: FAMILY MEDICINE

## 2023-05-15 PROCEDURE — 84153 ASSAY OF PSA TOTAL: CPT | Performed by: FAMILY MEDICINE

## 2023-05-15 PROCEDURE — 99999 PR PBB SHADOW E&M-EST. PATIENT-LVL V: CPT | Mod: PBBFAC,,, | Performed by: FAMILY MEDICINE

## 2023-05-15 PROCEDURE — 3008F PR BODY MASS INDEX (BMI) DOCUMENTED: ICD-10-PCS | Mod: CPTII,S$GLB,, | Performed by: FAMILY MEDICINE

## 2023-05-15 PROCEDURE — 80053 COMPREHEN METABOLIC PANEL: CPT | Performed by: FAMILY MEDICINE

## 2023-05-15 PROCEDURE — 3078F DIAST BP <80 MM HG: CPT | Mod: CPTII,S$GLB,, | Performed by: FAMILY MEDICINE

## 2023-05-15 PROCEDURE — 3074F SYST BP LT 130 MM HG: CPT | Mod: CPTII,S$GLB,, | Performed by: FAMILY MEDICINE

## 2023-05-15 PROCEDURE — 3008F BODY MASS INDEX DOCD: CPT | Mod: CPTII,S$GLB,, | Performed by: FAMILY MEDICINE

## 2023-05-15 PROCEDURE — 99396 PR PREVENTIVE VISIT,EST,40-64: ICD-10-PCS | Mod: S$GLB,,, | Performed by: FAMILY MEDICINE

## 2023-05-15 RX ORDER — SILDENAFIL 50 MG/1
50 TABLET, FILM COATED ORAL DAILY PRN
Qty: 30 TABLET | Refills: 2 | Status: ON HOLD | OUTPATIENT
Start: 2023-05-15 | End: 2024-01-04 | Stop reason: HOSPADM

## 2023-05-15 RX ORDER — ROSUVASTATIN CALCIUM 10 MG/1
10 TABLET, COATED ORAL DAILY
Qty: 90 TABLET | Refills: 1 | Status: SHIPPED | OUTPATIENT
Start: 2023-05-15 | End: 2023-06-23

## 2023-05-15 NOTE — PROGRESS NOTES
Subjective:       Patient ID: Zaheer Neal is a 63 y.o. male.    Chief Complaint: Annual Exam and Urticaria (Legs and arms )    Established patient for an annual wellness check/physical exam and also chronic disease management. Specific complaints - see dictation, M*model entries and please see ROS.  Past, Surgical, Family, Social Histories; Medications, Allergies reviewed and reconciled.  Health maintenance file reviewed and addressed items due. Recent applicable lab, imaging and cardiovascular results reviewed.  Problem list items reviewed and modified or added entries (in the overview section) may not be transcribed into this encounter note due to note writer format.      Patient has an ASCVD risk score of 10.1%.  I discussed this with him and the implications for long-term.  Recommended consideration of statin medication which he is amenable to start taking.  General side effects were discussed.  He has no acute complaints at this time however he formally had annual stress testing through the [facility] as a .  We discussed and will go ahead and order an updated test.  He is not exercising at a high pace these days.  Follows with endocrinology clinic concerning his thyroid condition without difficulty at this time.  Reported some ED. Side effects and effects of Viagra were discussed.    He had some hives that were worked up by the Allergy immunology service.  These have largely improved or resolved.      Review of Systems   Constitutional:  Negative for appetite change, chills, diaphoresis, fatigue and fever.   HENT:  Positive for tinnitus. Negative for congestion, postnasal drip, rhinorrhea, sore throat and trouble swallowing.    Eyes:  Negative for visual disturbance.   Respiratory:  Negative for cough, choking, chest tightness, shortness of breath and wheezing.    Cardiovascular:  Negative for chest pain and leg swelling.   Gastrointestinal:  Negative for abdominal distention, abdominal pain,  diarrhea, nausea and vomiting.   Genitourinary:  Negative for difficulty urinating and hematuria.        Ed   Musculoskeletal:  Negative for arthralgias and myalgias.   Skin:  Positive for rash.        Hives resolved   Neurological:  Negative for weakness, light-headedness and headaches.   Psychiatric/Behavioral:  Negative for confusion and dysphoric mood.      Objective:      Physical Exam  Vitals and nursing note reviewed.   Constitutional:       Appearance: He is well-developed. He is not diaphoretic.   Eyes:      General: No scleral icterus.  Neck:      Thyroid: No thyromegaly.      Vascular: No carotid bruit or JVD.      Trachea: No tracheal deviation.   Cardiovascular:      Rate and Rhythm: Normal rate and regular rhythm.      Heart sounds: Normal heart sounds. No murmur heard.    No friction rub. No gallop.   Pulmonary:      Effort: Pulmonary effort is normal. No respiratory distress.      Breath sounds: Normal breath sounds. No wheezing or rales.   Abdominal:      General: There is no distension.      Palpations: Abdomen is soft. There is no mass.      Tenderness: There is no abdominal tenderness. There is no guarding or rebound.   Musculoskeletal:      Cervical back: Normal range of motion and neck supple.   Lymphadenopathy:      Cervical: No cervical adenopathy.   Skin:     General: Skin is warm and dry.      Findings: No erythema or rash.   Neurological:      Mental Status: He is alert and oriented to person, place, and time.      Cranial Nerves: No cranial nerve deficit.      Motor: No tremor.      Coordination: Coordination normal.      Gait: Gait normal.   Psychiatric:         Behavior: Behavior normal.         Thought Content: Thought content normal.         Judgment: Judgment normal.       Assessment:       1. Annual physical exam    2. Hypothyroidism (acquired)    3. Hyperlipidemia, unspecified hyperlipidemia type    4. Malignant neoplasm of thyroid gland    5. Colon cancer screening    6.  [place] cardiac risk 10-20% in next 10 years    7. Erectile dysfunction, unspecified erectile dysfunction type    8. Tinnitus of both ears        Plan:     Medication List with Changes/Refills   New Medications    ROSUVASTATIN (CRESTOR) 10 MG TABLET    Take 1 tablet (10 mg total) by mouth once daily.    SILDENAFIL (VIAGRA) 50 MG TABLET    Take 1 tablet (50 mg total) by mouth daily as needed for Erectile Dysfunction.   Current Medications    CETIRIZINE (ZYRTEC) 10 MG TABLET    Take 10 mg by mouth.    DICLOFENAC SODIUM (VOLTAREN) 1 % GEL    Apply 2 g topically 4 (four) times daily. To areas of foot pain    EPINEPHRINE (EPIPEN) 0.3 MG/0.3 ML ATIN    Inject 0.3 mLs (0.3 mg total) into the muscle once. for 1 dose    FAMOTIDINE (PEPCID) 20 MG TABLET    Take 1 tablet (20 mg total) by mouth 2 (two) times daily.    FLUTICASONE PROPIONATE (FLONASE) 50 MCG/ACTUATION NASAL SPRAY    1 spray (50 mcg total) by Each Nostril route 2 (two) times a day.    GUAIFENESIN-CODEINE 100-10 MG/5 ML (TUSSI-ORGANIDIN NR)  MG/5 ML SYRUP    Take by mouth.    HYDROXYZINE HCL (ATARAX) 25 MG TABLET    1 to 8 tablets at bedtime.  Start with 3 tablets.  Increase or decrease as needed until itching is controlled or a maximum of 8 tablets.    LACTOBACILLUS RHAMNOSUS GG (CULTURELLE) 10 BILLION CELL CAPSULE    Take 1 capsule by mouth once daily.    POLY HIST FORTE 10.5-10 MG TAB    Take 1 tablet by mouth every 8 (eight) hours as needed.    SYNTHROID 150 MCG TABLET    TAKE 1 TABLET(150 MCG) BY MOUTH BEFORE BREAKFAST  Strength: 150 mcg    TAMSULOSIN (FLOMAX) 0.4 MG CAP    Take 1 capsule (0.4 mg total) by mouth once daily.   Discontinued Medications    MULTIVITAMIN (THERAGRAN) PER TABLET    Take 1 tablet by mouth once daily.    POMEGRANATE XT/POMEGRANAT SEED (POMEGRANATE ORAL)    Take by mouth.    PREDNISONE (DELTASONE) 20 MG TABLET    Take 40 mg by mouth.    PUMPKIN SEED EXTRACT-SOY GERM 300 MG CAP    Take by mouth.     1. Annual physical exam  -      Comprehensive Metabolic Panel; Standing  -     Lipid Panel; Standing    2. Hypothyroidism (acquired)  Overview:  Thyroid cancer 2011      3. Hyperlipidemia, unspecified hyperlipidemia type  -     Comprehensive Metabolic Panel; Standing  -     Lipid Panel; Standing  -     EKG 12-lead; Future; Expected date: 05/15/2023  -     Stress Echo Which stress agent will be used? Treadmill Exercise; Future; Expected date: 05/15/2023  -     rosuvastatin (CRESTOR) 10 MG tablet; Take 1 tablet (10 mg total) by mouth once daily.  Dispense: 90 tablet; Refill: 1    4. Malignant neoplasm of thyroid gland  Overview:  -circa 2011, Stage 3, [facility]  -followed in endocrinology      5. Colon cancer screening  -     Ambulatory referral/consult to Endo Procedure ; Future; Expected date: 05/16/2023    6. [place] cardiac risk 10-20% in next 10 years  -     EKG 12-lead; Future; Expected date: 05/15/2023  -     Stress Echo Which stress agent will be used? Treadmill Exercise; Future; Expected date: 05/15/2023    7. Erectile dysfunction, unspecified erectile dysfunction type  -     sildenafiL (VIAGRA) 50 MG tablet; Take 1 tablet (50 mg total) by mouth daily as needed for Erectile Dysfunction.  Dispense: 30 tablet; Refill: 2    8. Tinnitus of both ears  -     Ambulatory referral/consult to Audiology; Future; Expected date: 05/22/2023  -     Ambulatory referral/consult to ENT; Future; Expected date: 05/22/2023      See meds, orders, follow up, routing and instructions sections of encounter and AVS. Discussed with patient and provided on AVS.    Discussed diet and exercise as therapeutic modalities for metabolic and other conditions. Provided patient information, which are included as links on the AVS for detailed information.    Lab Results   Component Value Date     03/01/2023    K 4.1 03/01/2023     03/01/2023    BUN 14 03/01/2023    CREATININE 1.1 03/01/2023    GLU 91 03/01/2023    HGBA1C 5.7 (H) 06/16/2022    AST  22 03/01/2023    AST 50 (H) 04/15/2016    ALT 23 03/01/2023    ALBUMIN 4.1 03/01/2023    PROT 7.6 03/01/2023    BILITOT 0.3 03/01/2023    CHOL 168 06/13/2022    CHOL 172 09/26/2017    HDL 40 06/13/2022    LDLCALC 107.6 06/13/2022    TRIG 102 06/13/2022    WBC 4.06 03/01/2023    HGB 12.8 (L) 03/01/2023    HCT 40.3 03/01/2023     03/01/2023    PSA 0.97 06/13/2022    PSADIAG 1.1 01/05/2023    TSH 0.643 03/01/2023

## 2023-05-15 NOTE — PATIENT INSTRUCTIONS
Schedule lab orders for today.      If not contacted in a couple weeks by colonoscopy scheduling department - call Colonoscopy Scheduling Number - [phone].       Information about cholesterol, high blood pressure and healthy diet and activity recommendations can be found at the following links on the Internet:    Cholesterol  http://www.nhlbi.nih.gov/health/health-topics/topics/hbc    Weight loss  http://www.nhlbi.nih.gov/health/educational/lose_wt/index.htm    High Blood Pressure  Http://www.nhlbi.nih.gov/files/docs/public/heart/hbp_low.pdf    Cardiovascular General  http://www.heart.org/HEARTORG/    Diabetes General  http://diabetes.org/    CDC  https://www.cdc.gov/    Healthfinder  Https://healthfinder.gov/    Dietary Guide - Comprehensive  https://health.gov/dietaryguidelines/2015/guidelines/    Physical Activity and Exercise  https://health.gov/paguidelines/second-edition/pdf/Physical_Activity_Guidelines_2nd_edition.pdf    Choosing Wisely  https://www.choosingwisely.org/patient-resources/

## 2023-05-16 ENCOUNTER — HOSPITAL ENCOUNTER (OUTPATIENT)
Dept: CARDIOLOGY | Facility: CLINIC | Age: 63
Discharge: HOME | End: 2023-05-16
Attending: FAMILY MEDICINE
Payer: COMMERCIAL

## 2023-05-16 DIAGNOSIS — Z91.89 FRAMINGHAM CARDIAC RISK 10-20% IN NEXT 10 YEARS: ICD-10-CM

## 2023-05-16 DIAGNOSIS — E78.5 HYPERLIPIDEMIA, UNSPECIFIED HYPERLIPIDEMIA TYPE: ICD-10-CM

## 2023-05-16 PROCEDURE — 93005 ELECTROCARDIOGRAM TRACING: CPT | Mod: S$GLB,,, | Performed by: FAMILY MEDICINE

## 2023-05-16 PROCEDURE — 93010 EKG 12-LEAD: ICD-10-PCS | Mod: S$GLB,,, | Performed by: INTERNAL MEDICINE

## 2023-05-16 PROCEDURE — 93010 ELECTROCARDIOGRAM REPORT: CPT | Mod: S$GLB,,, | Performed by: INTERNAL MEDICINE

## 2023-05-16 PROCEDURE — 93005 EKG 12-LEAD: ICD-10-PCS | Mod: S$GLB,,, | Performed by: FAMILY MEDICINE

## 2023-05-17 ENCOUNTER — TELEPHONE (OUTPATIENT)
Dept: INTERNAL MEDICINE | Facility: CLINIC | Age: 63
End: 2023-05-17

## 2023-05-17 ENCOUNTER — OFFICE VISIT (OUTPATIENT)
Dept: ENDOCRINOLOGY | Facility: CLINIC | Age: 63
End: 2023-05-17
Payer: COMMERCIAL

## 2023-05-17 VITALS — HEIGHT: 78 IN | WEIGHT: 298.19 LBS | BODY MASS INDEX: 34.5 KG/M2 | HEART RATE: 84 BPM | OXYGEN SATURATION: 97 %

## 2023-05-17 DIAGNOSIS — E89.0 POST-SURGICAL HYPOTHYROIDISM: Primary | ICD-10-CM

## 2023-05-17 DIAGNOSIS — C73 MALIGNANT NEOPLASM OF THYROID GLAND: ICD-10-CM

## 2023-05-17 PROCEDURE — 1160F RVW MEDS BY RX/DR IN RCRD: CPT | Mod: CPTII,S$GLB,, | Performed by: NURSE PRACTITIONER

## 2023-05-17 PROCEDURE — 99214 PR OFFICE/OUTPT VISIT, EST, LEVL IV, 30-39 MIN: ICD-10-PCS | Mod: S$GLB,,, | Performed by: NURSE PRACTITIONER

## 2023-05-17 PROCEDURE — 1160F PR REVIEW ALL MEDS BY PRESCRIBER/CLIN PHARMACIST DOCUMENTED: ICD-10-PCS | Mod: CPTII,S$GLB,, | Performed by: NURSE PRACTITIONER

## 2023-05-17 PROCEDURE — 1159F MED LIST DOCD IN RCRD: CPT | Mod: CPTII,S$GLB,, | Performed by: NURSE PRACTITIONER

## 2023-05-17 PROCEDURE — 99999 PR PBB SHADOW E&M-EST. PATIENT-LVL IV: CPT | Mod: PBBFAC,,, | Performed by: NURSE PRACTITIONER

## 2023-05-17 PROCEDURE — 3008F BODY MASS INDEX DOCD: CPT | Mod: CPTII,S$GLB,, | Performed by: NURSE PRACTITIONER

## 2023-05-17 PROCEDURE — 3008F PR BODY MASS INDEX (BMI) DOCUMENTED: ICD-10-PCS | Mod: CPTII,S$GLB,, | Performed by: NURSE PRACTITIONER

## 2023-05-17 PROCEDURE — 99214 OFFICE O/P EST MOD 30 MIN: CPT | Mod: S$GLB,,, | Performed by: NURSE PRACTITIONER

## 2023-05-17 PROCEDURE — 99999 PR PBB SHADOW E&M-EST. PATIENT-LVL IV: ICD-10-PCS | Mod: PBBFAC,,, | Performed by: NURSE PRACTITIONER

## 2023-05-17 PROCEDURE — 1159F PR MEDICATION LIST DOCUMENTED IN MEDICAL RECORD: ICD-10-PCS | Mod: CPTII,S$GLB,, | Performed by: NURSE PRACTITIONER

## 2023-05-17 RX ORDER — LEVOTHYROXINE SODIUM 13 UG/1
150 CAPSULE ORAL DAILY
Qty: 30 CAPSULE | Refills: 6 | Status: SHIPPED | OUTPATIENT
Start: 2023-05-17 | End: 2023-05-23 | Stop reason: SDUPTHER

## 2023-05-17 NOTE — ASSESSMENT & PLAN NOTE
-- Labs in 8 weeks.  -- Trial of Tirosint.  -- Medication Changes:   CONTINUE: Tirosint 150mcg daily.  -- Goal is a low normal TSH.  -- Avoid exogenous hyperthyroidism as this can accelerate bone loss and increase risk of CV complications.  -- Advised to take LT4 on an empty stomach with water and to wait 30-45 minutes before eating or taking other medications   -- Reviewed usual times of thyroid hormone changes  -- Reviewed that symptoms of hypothyroidism may not correlate with tsh, and a normal TSH is the goal of therapy. Symptoms are not a justification for over treatment.

## 2023-05-23 ENCOUNTER — PATIENT MESSAGE (OUTPATIENT)
Dept: ENDOCRINOLOGY | Facility: CLINIC | Age: 63
End: 2023-05-23
Payer: COMMERCIAL

## 2023-05-23 DIAGNOSIS — E89.0 POST-SURGICAL HYPOTHYROIDISM: ICD-10-CM

## 2023-05-23 DIAGNOSIS — C73 MALIGNANT NEOPLASM OF THYROID GLAND: ICD-10-CM

## 2023-05-23 RX ORDER — LEVOTHYROXINE SODIUM 13 UG/1
150 CAPSULE ORAL DAILY
Qty: 30 CAPSULE | Refills: 6 | Status: SHIPPED | OUTPATIENT
Start: 2023-05-23 | End: 2023-07-24

## 2023-06-02 DIAGNOSIS — E78.5 HYPERLIPIDEMIA, UNSPECIFIED HYPERLIPIDEMIA TYPE: Primary | ICD-10-CM

## 2023-06-19 ENCOUNTER — TELEPHONE (OUTPATIENT)
Dept: INTERNAL MEDICINE | Facility: CLINIC | Age: 63
End: 2023-06-19
Payer: COMMERCIAL

## 2023-06-19 DIAGNOSIS — E78.5 HYPERLIPIDEMIA, UNSPECIFIED HYPERLIPIDEMIA TYPE: Primary | ICD-10-CM

## 2023-06-19 NOTE — TELEPHONE ENCOUNTER
----- Message from Suzi Arteaga sent at 6/19/2023  3:08 PM CDT -----  Contact: [phone]  Patient is returning a phone call.  Who left a message for the patient: Berenice Venegas RN  Does patient know what this is regarding:  yes   Would you like a call back, or a response through your [facility] portal?:   call back   Comments:

## 2023-06-19 NOTE — TELEPHONE ENCOUNTER
Called pt to review results note from PCP, no answer. Left message to return call.    General Post Procedure Care    Cover with bandaid if site begins to ooze, call if worsening  Call for fevers, redness, swelling or pus-like drainage at surgical site  Drink plenty of fluids  Take prescription pain medication with food to prevent nausea  Do not take prescription pain medications and Tylenol together, only one or the other when a dose is due  Narcotic medications may cause constipation, to prevent: increase fluids, high fiber diet, movement (ie walking). May get an over the counter stool softener if needed  May take over the counter pain medication for mild pain  Call for persistent nausea or vomiting  Call for follow up appointment  Rest today, ease into normal activity tomorrow   No heavy lifting or strenuous activity until approved by surgeon  Surgical glue will dissolve within 2 weeks   Use ice pack for comfort    Discharge Instructions after Mediport placement    Do not lift with operative arm and do not raise arm over head  Follow up with your doctor regarding treatment schedule  Ice surgical site    Post Anesthesia Care    No driving for 24 hours after anesthesia   No driving when taking prescription pain medications   No alcohol or smoking for 24 hours   Ambulate with assistance  Someone should stay with you for first 24 hours after anesthesia  Drink plenty of fluids  Sore throat is common after surgery: cough drops, cold liquids, or gargling warm salt water can help alleviate discomfort  Rest today, ease into normal activity or refer to surgeons activity restrictions starting tomorrow  Resume normal diet, avoid greasy and spicy foods

## 2023-06-19 NOTE — TELEPHONE ENCOUNTER
Called and discussed test results and recommendations with the pt. The pt expressed understanding.     Pt informed me that he has been taking his Cholesterol medication. A dose adjustment may be in order.

## 2023-06-19 NOTE — TELEPHONE ENCOUNTER
----- Message from Silvino Zuñiga MD sent at 6/17/2023  2:59 PM CDT -----  Cholesterol was high - please contact pateint and see if he has been takin his medication. We may need to go up on dose if so.    Thank you

## 2023-06-23 RX ORDER — ROSUVASTATIN CALCIUM 20 MG/1
20 TABLET, COATED ORAL DAILY
Qty: 90 TABLET | Refills: 1 | Status: SHIPPED | OUTPATIENT
Start: 2023-06-23

## 2023-06-23 NOTE — TELEPHONE ENCOUNTER
Please call patient and adivse him that we'll increase the dose of rosuvastatin (cholesterol med) to 20 mg per day. New scrpt called in. He can double existing until they are used up.    I recommend follow up testing. Please see orders for Lipids, ALT, and AST and please schedule in 3 months.     Thank you.

## 2023-06-28 NOTE — TELEPHONE ENCOUNTER
Pt returned call. Dosage change discussed with the pt. The pt expressed understanding.     3 month labs scheduled.

## 2023-07-05 ENCOUNTER — HOSPITAL ENCOUNTER (OUTPATIENT)
Dept: ENDOCRINOLOGY | Facility: CLINIC | Age: 63
Discharge: HOME | End: 2023-07-05
Attending: NURSE PRACTITIONER
Payer: COMMERCIAL

## 2023-07-05 ENCOUNTER — PATIENT MESSAGE (OUTPATIENT)
Dept: ENDOCRINOLOGY | Facility: CLINIC | Age: 63
End: 2023-07-05
Payer: COMMERCIAL

## 2023-07-05 DIAGNOSIS — E89.0 POST-SURGICAL HYPOTHYROIDISM: ICD-10-CM

## 2023-07-05 DIAGNOSIS — C73 MALIGNANT NEOPLASM OF THYROID GLAND: ICD-10-CM

## 2023-07-05 PROCEDURE — 76536 US EXAM OF HEAD AND NECK: CPT | Mod: S$GLB,,, | Performed by: INTERNAL MEDICINE

## 2023-07-05 PROCEDURE — 76536 US SOFT TISSUE HEAD NECK THYROID: ICD-10-PCS | Mod: S$GLB,,, | Performed by: INTERNAL MEDICINE

## 2023-07-06 ENCOUNTER — HOSPITAL ENCOUNTER (OUTPATIENT)
Dept: CARDIOLOGY | Facility: HOSPITAL | Age: 63
Discharge: HOME | End: 2023-07-06
Attending: FAMILY MEDICINE
Payer: COMMERCIAL

## 2023-07-06 VITALS — HEIGHT: 78 IN | WEIGHT: 298 LBS | BODY MASS INDEX: 34.48 KG/M2

## 2023-07-06 DIAGNOSIS — E78.5 HYPERLIPIDEMIA, UNSPECIFIED HYPERLIPIDEMIA TYPE: ICD-10-CM

## 2023-07-06 DIAGNOSIS — Z91.89 FRAMINGHAM CARDIAC RISK 10-20% IN NEXT 10 YEARS: ICD-10-CM

## 2023-07-06 LAB
ASCENDING AORTA: 3.08 CM
AV INDEX (PROSTH): 0.91
AV MEAN GRADIENT: 3 MMHG
AV PEAK GRADIENT: 5 MMHG
AV VALVE AREA: 4.27 CM2
AV VELOCITY RATIO: 0.98
BSA FOR ECHO PROCEDURE: 2.73 M2
CV ECHO LV RWT: 0.48 CM
CV STRESS BASE HR: 68 BPM
DIASTOLIC BLOOD PRESSURE: 73 MMHG
DOP CALC AO PEAK VEL: 1.14 M/S
DOP CALC AO VTI: 25.87 CM
DOP CALC LVOT AREA: 4.7 CM2
DOP CALC LVOT DIAMETER: 2.44 CM
DOP CALC LVOT PEAK VEL: 1.12 M/S
DOP CALC LVOT STROKE VOLUME: 110.48 CM3
DOP CALCLVOT PEAK VEL VTI: 23.64 CM
E WAVE DECELERATION TIME: 263.28 MSEC
E/A RATIO: 2
E/E' RATIO: 6.4 M/S
ECHO LV POSTERIOR WALL: 1.17 CM (ref 0.6–1.1)
EJECTION FRACTION: 63 %
FRACTIONAL SHORTENING: 30 % (ref 28–44)
INTERVENTRICULAR SEPTUM: 0.9 CM (ref 0.6–1.1)
IVRT: 91.34 MSEC
LA MAJOR: 6.5 CM
LA MINOR: 6.38 CM
LA WIDTH: 5.01 CM
LEFT ATRIUM SIZE: 3.56 CM
LEFT ATRIUM VOLUME INDEX MOD: 32.4 ML/M2
LEFT ATRIUM VOLUME INDEX: 36.6 ML/M2
LEFT ATRIUM VOLUME MOD: 86.49 CM3
LEFT ATRIUM VOLUME: 97.62 CM3
LEFT INTERNAL DIMENSION IN SYSTOLE: 3.42 CM (ref 2.1–4)
LEFT VENTRICLE DIASTOLIC VOLUME INDEX: 42.2 ML/M2
LEFT VENTRICLE DIASTOLIC VOLUME: 112.68 ML
LEFT VENTRICLE MASS INDEX: 69 G/M2
LEFT VENTRICLE SYSTOLIC VOLUME INDEX: 18 ML/M2
LEFT VENTRICLE SYSTOLIC VOLUME: 48.05 ML
LEFT VENTRICULAR INTERNAL DIMENSION IN DIASTOLE: 4.9 CM (ref 3.5–6)
LEFT VENTRICULAR MASS: 184.44 G
LV LATERAL E/E' RATIO: 5.33 M/S
LV SEPTAL E/E' RATIO: 8 M/S
MV A" WAVE DURATION": 12.27 MSEC
MV PEAK A VEL: 0.32 M/S
MV PEAK E VEL: 0.64 M/S
MV STENOSIS PRESSURE HALF TIME: 76.35 MS
MV VALVE AREA P 1/2 METHOD: 2.88 CM2
OHS CV CPX 1 MINUTE RECOVERY HEART RATE: 144 BPM
OHS CV CPX 85 PERCENT MAX PREDICTED HEART RATE MALE: 133
OHS CV CPX ESTIMATED METS: 14
OHS CV CPX MAX PREDICTED HEART RATE: 157
OHS CV CPX PATIENT IS FEMALE: 0
OHS CV CPX PATIENT IS MALE: 1
OHS CV CPX PEAK DIASTOLIC BLOOD PRESSURE: 44 MMHG
OHS CV CPX PEAK HEAR RATE: 157 BPM
OHS CV CPX PEAK RATE PRESSURE PRODUCT: ABNORMAL
OHS CV CPX PEAK SYSTOLIC BLOOD PRESSURE: 198 MMHG
OHS CV CPX PERCENT MAX PREDICTED HEART RATE ACHIEVED: 100
OHS CV CPX RATE PRESSURE PRODUCT PRESENTING: 9180
PISA TR MAX VEL: 2.41 M/S
PULM VEIN S/D RATIO: 0.78
PV PEAK D VEL: 0.74 M/S
PV PEAK S VEL: 0.58 M/S
RA MAJOR: 6.38 CM
RA PRESSURE: 3 MMHG
RA WIDTH: 4.77 CM
RIGHT VENTRICULAR END-DIASTOLIC DIMENSION: 4.25 CM
SINUS: 3.59 CM
STJ: 2.83 CM
STRESS ECHO POST EXERCISE DUR MIN: 8 MINUTES
STRESS ECHO POST EXERCISE DUR SEC: 14 SECONDS
SYSTOLIC BLOOD PRESSURE: 135 MMHG
TDI LATERAL: 0.12 M/S
TDI SEPTAL: 0.08 M/S
TDI: 0.1 M/S
TR MAX PG: 23 MMHG
TRICUSPID ANNULAR PLANE SYSTOLIC EXCURSION: 2.46 CM
TV REST PULMONARY ARTERY PRESSURE: 26 MMHG

## 2023-07-06 PROCEDURE — 93320 DOPPLER ECHO COMPLETE: CPT | Mod: 26,,, | Performed by: INTERNAL MEDICINE

## 2023-07-06 PROCEDURE — 93351 STRESS TTE COMPLETE: CPT | Mod: 26,,, | Performed by: INTERNAL MEDICINE

## 2023-07-06 PROCEDURE — 93351 STRESS TTE COMPLETE: CPT

## 2023-07-06 PROCEDURE — 93325 DOPPLER ECHO COLOR FLOW MAPG: CPT | Mod: 26,,, | Performed by: INTERNAL MEDICINE

## 2023-07-06 PROCEDURE — 93325 STRESS ECHO (CUPID ONLY): ICD-10-PCS | Mod: 26,,, | Performed by: INTERNAL MEDICINE

## 2023-07-06 PROCEDURE — 93351 STRESS ECHO (CUPID ONLY): ICD-10-PCS | Mod: 26,,, | Performed by: INTERNAL MEDICINE

## 2023-07-06 PROCEDURE — 93320 STRESS ECHO (CUPID ONLY): ICD-10-PCS | Mod: 26,,, | Performed by: INTERNAL MEDICINE

## 2023-07-12 ENCOUNTER — LAB VISIT (OUTPATIENT)
Dept: LAB | Facility: HOSPITAL | Age: 63
End: 2023-07-12
Attending: NURSE PRACTITIONER
Payer: COMMERCIAL

## 2023-07-12 DIAGNOSIS — E89.0 POST-SURGICAL HYPOTHYROIDISM: ICD-10-CM

## 2023-07-12 DIAGNOSIS — E78.5 HYPERLIPIDEMIA, UNSPECIFIED HYPERLIPIDEMIA TYPE: ICD-10-CM

## 2023-07-12 DIAGNOSIS — C73 MALIGNANT NEOPLASM OF THYROID GLAND: ICD-10-CM

## 2023-07-12 LAB
25(OH)D3+25(OH)D2 SERPL-MCNC: 39 NG/ML (ref 30–96)
ALT SERPL W/O P-5'-P-CCNC: 80 U/L (ref 10–44)
AST SERPL-CCNC: 53 U/L (ref 15–46)
CHOLEST SERPL-MCNC: 107 MG/DL (ref 120–199)
CHOLEST/HDLC SERPL: 3 {RATIO} (ref 2–5)
ESTIMATED AVG GLUCOSE: 108 MG/DL (ref 68–131)
HBA1C MFR BLD: 5.4 % (ref 4–5.6)
HDLC SERPL-MCNC: 36 MG/DL (ref 40–75)
HDLC SERPL: 33.6 % (ref 20–50)
LDLC SERPL CALC-MCNC: 55.8 MG/DL (ref 63–159)
NONHDLC SERPL-MCNC: 71 MG/DL
T4 FREE SERPL-MCNC: 1.05 NG/DL (ref 0.71–1.51)
TRIGL SERPL-MCNC: 76 MG/DL (ref 30–150)
TSH SERPL DL<=0.005 MIU/L-ACNC: 0.36 UIU/ML (ref 0.4–4)

## 2023-07-12 PROCEDURE — 84439 ASSAY OF FREE THYROXINE: CPT | Performed by: NURSE PRACTITIONER

## 2023-07-12 PROCEDURE — 84443 ASSAY THYROID STIM HORMONE: CPT | Mod: PO | Performed by: NURSE PRACTITIONER

## 2023-07-12 PROCEDURE — 80061 LIPID PANEL: CPT | Performed by: FAMILY MEDICINE

## 2023-07-12 PROCEDURE — 84460 ALANINE AMINO (ALT) (SGPT): CPT | Mod: PO | Performed by: FAMILY MEDICINE

## 2023-07-12 PROCEDURE — 84450 TRANSFERASE (AST) (SGOT): CPT | Mod: PO | Performed by: FAMILY MEDICINE

## 2023-07-12 PROCEDURE — 83036 HEMOGLOBIN GLYCOSYLATED A1C: CPT | Performed by: NURSE PRACTITIONER

## 2023-07-12 PROCEDURE — 84432 ASSAY OF THYROGLOBULIN: CPT | Mod: PO | Performed by: NURSE PRACTITIONER

## 2023-07-12 PROCEDURE — 82306 VITAMIN D 25 HYDROXY: CPT | Mod: PO | Performed by: NURSE PRACTITIONER

## 2023-07-12 PROCEDURE — 36415 COLL VENOUS BLD VENIPUNCTURE: CPT | Mod: PO | Performed by: NURSE PRACTITIONER

## 2023-07-17 ENCOUNTER — PATIENT MESSAGE (OUTPATIENT)
Dept: ENDOCRINOLOGY | Facility: CLINIC | Age: 63
End: 2023-07-17
Payer: COMMERCIAL

## 2023-07-17 NOTE — TELEPHONE ENCOUNTER
Component      Latest Ref Rng & Units 7/12/2023   Thyroglobulin, Tumor Marker      ng/mL <0.1   Thyroglobulin Antibody Screen      <1.8 IU/mL <1.8   Thyroglobulin Interpretation       SEE BELOW   Hemoglobin A1C External      4.0 - 5.6 % 5.4   Estimated Avg Glucose      68 - 131 mg/dL 108   TSH      0.400 - 4.000 uIU/mL 0.364 (L)   Vit D, 25-Hydroxy      30 - 96 ng/mL 39   Free T4      0.71 - 1.51 ng/dL 1.05

## 2023-07-22 ENCOUNTER — PATIENT MESSAGE (OUTPATIENT)
Dept: ENDOCRINOLOGY | Facility: CLINIC | Age: 63
End: 2023-07-22
Payer: COMMERCIAL

## 2023-07-22 DIAGNOSIS — E89.0 POST-SURGICAL HYPOTHYROIDISM: ICD-10-CM

## 2023-07-22 DIAGNOSIS — C73 MALIGNANT NEOPLASM OF THYROID GLAND: ICD-10-CM

## 2023-07-24 RX ORDER — LEVOTHYROXINE SODIUM 13 UG/1
150 CAPSULE ORAL DAILY
Qty: 90 CAPSULE | Refills: 3 | Status: SHIPPED | OUTPATIENT
Start: 2023-07-24 | End: 2024-03-13 | Stop reason: SDUPTHER

## 2023-08-08 ENCOUNTER — CLINICAL SUPPORT (OUTPATIENT)
Dept: AUDIOLOGY | Facility: CLINIC | Age: 63
End: 2023-08-08
Attending: FAMILY MEDICINE
Payer: COMMERCIAL

## 2023-08-08 ENCOUNTER — OFFICE VISIT (OUTPATIENT)
Dept: OTOLARYNGOLOGY | Facility: CLINIC | Age: 63
End: 2023-08-08
Attending: FAMILY MEDICINE
Payer: COMMERCIAL

## 2023-08-08 DIAGNOSIS — H93.11 TINNITUS, RIGHT: ICD-10-CM

## 2023-08-08 DIAGNOSIS — H90.3 BILATERAL HIGH FREQUENCY SENSORINEURAL HEARING LOSS: Primary | ICD-10-CM

## 2023-08-08 DIAGNOSIS — H93.13 TINNITUS OF BOTH EARS: ICD-10-CM

## 2023-08-08 DIAGNOSIS — H90.3 SENSORINEURAL HEARING LOSS (SNHL), BILATERAL: Primary | ICD-10-CM

## 2023-08-08 PROCEDURE — 99999 PR PBB SHADOW E&M-EST. PATIENT-LVL II: CPT | Mod: PBBFAC,,, | Performed by: AUDIOLOGIST

## 2023-08-08 PROCEDURE — 99999 PR PBB SHADOW E&M-EST. PATIENT-LVL III: CPT | Mod: PBBFAC,,, | Performed by: OTOLARYNGOLOGY

## 2023-08-08 PROCEDURE — 99203 OFFICE O/P NEW LOW 30 MIN: CPT | Mod: S$GLB,,, | Performed by: OTOLARYNGOLOGY

## 2023-08-08 PROCEDURE — 3044F HG A1C LEVEL LT 7.0%: CPT | Mod: CPTII,S$GLB,, | Performed by: OTOLARYNGOLOGY

## 2023-08-08 PROCEDURE — 92557 PR COMPREHENSIVE HEARING TEST: ICD-10-PCS | Mod: S$GLB,,, | Performed by: AUDIOLOGIST

## 2023-08-08 PROCEDURE — 92567 PR TYMPA2METRY: ICD-10-PCS | Mod: S$GLB,,, | Performed by: AUDIOLOGIST

## 2023-08-08 PROCEDURE — 99203 PR OFFICE/OUTPT VISIT, NEW, LEVL III, 30-44 MIN: ICD-10-PCS | Mod: S$GLB,,, | Performed by: OTOLARYNGOLOGY

## 2023-08-08 PROCEDURE — 99999 PR PBB SHADOW E&M-EST. PATIENT-LVL III: ICD-10-PCS | Mod: PBBFAC,,, | Performed by: OTOLARYNGOLOGY

## 2023-08-08 PROCEDURE — 99999 PR PBB SHADOW E&M-EST. PATIENT-LVL II: ICD-10-PCS | Mod: PBBFAC,,, | Performed by: AUDIOLOGIST

## 2023-08-08 PROCEDURE — 1159F MED LIST DOCD IN RCRD: CPT | Mod: CPTII,S$GLB,, | Performed by: OTOLARYNGOLOGY

## 2023-08-08 PROCEDURE — 92567 TYMPANOMETRY: CPT | Mod: S$GLB,,, | Performed by: AUDIOLOGIST

## 2023-08-08 PROCEDURE — 92557 COMPREHENSIVE HEARING TEST: CPT | Mod: S$GLB,,, | Performed by: AUDIOLOGIST

## 2023-08-08 PROCEDURE — 1159F PR MEDICATION LIST DOCUMENTED IN MEDICAL RECORD: ICD-10-PCS | Mod: CPTII,S$GLB,, | Performed by: OTOLARYNGOLOGY

## 2023-08-08 PROCEDURE — 3044F PR MOST RECENT HEMOGLOBIN A1C LEVEL <7.0%: ICD-10-PCS | Mod: CPTII,S$GLB,, | Performed by: OTOLARYNGOLOGY

## 2023-08-08 NOTE — PROGRESS NOTES
Subjective     Patient ID: Zaheer Nela is a 63 y.o. male.    Chief Complaint: No chief complaint on file.    Ringing in Ears:   Chronicity:  Chronic and new  Progression since onset:  Resolved  Severity:  Moderate   Associated symptoms: Tinnitus.      Review of Systems   Constitutional: Negative.    HENT:  Positive for nosebleeds and tinnitus.    Eyes: Negative.    Respiratory: Negative.     Cardiovascular: Negative.    Gastrointestinal: Negative.    Endocrine: Negative.    Genitourinary: Negative.    Integumentary:  Negative.   Allergic/Immunologic: Positive for food allergies.   Neurological: Negative.    Hematological: Negative.    Psychiatric/Behavioral: Negative.            Objective     Physical Exam  Vitals and nursing note reviewed.   Constitutional:       Appearance: Normal appearance.   HENT:      Head: Normocephalic and atraumatic.      Right Ear: Tympanic membrane, ear canal and external ear normal. There is no impacted cerumen.      Left Ear: Tympanic membrane, ear canal and external ear normal. There is no impacted cerumen.   Neurological:      Mental Status: He is alert.          Assessment and Plan     Data Reviewed:      Audiogram tracings independently reviewed and discussed with patient shows high frequency hearing loss isolated to 3-4khz in severe range.  Normal SRT AU      1. Bilateral high frequency sensorineural hearing loss    2. Tinnitus of both ears  -     Ambulatory referral/consult to ENT          Discussed with patient multiple tinnitus management strategies including the use of maskers, instruments, background sound enrichment and other means. All questions answered and appropriate references given.             No follow-ups on file.

## 2023-08-08 NOTE — PROGRESS NOTES
8/8/2023    AUDIOLOGICAL EVALUATION:    Zaheer Neal was seen for an audiological evaluation on 8/8/2023 for recent onset tinnitus in the right ear with dizziness.  Mr. Neal reported most of his symptoms had resolved.  He had been taking some antihistamines and the tinnitus went away when he stopped taking the medication.  He also reported a history of noise exposure years ago with tinnitus that subsided.      Pure tone threshold testing revealed a high frequency sensorineural hearing loss bilaterally.  Speech reception thresholds were obtained at 20 dBHL for the right ear and 25 dBHL for the left ear.  Speech discrimination scores were obtained at 100% for the right ear and 100% for the left ear.    Tympanometry was within normal limits bilaterally indicating normal middle ear function.    Recommend:  Otolologic evaluation.  Hearing protection in noise  Annual evaluation.

## 2023-08-16 DIAGNOSIS — R94.5 ABNORMAL RESULTS OF LIVER FUNCTION STUDIES: Primary | ICD-10-CM

## 2023-08-17 ENCOUNTER — TELEPHONE (OUTPATIENT)
Dept: INTERNAL MEDICINE | Facility: CLINIC | Age: 63
End: 2023-08-17
Payer: COMMERCIAL

## 2023-08-17 ENCOUNTER — PATIENT MESSAGE (OUTPATIENT)
Dept: INTERNAL MEDICINE | Facility: CLINIC | Age: 63
End: 2023-08-17
Payer: COMMERCIAL

## 2023-08-17 NOTE — PROGRESS NOTES
Please call patient and explain that test(s) show mild elevation of liver tests. For now ok to continue choesterol medication.    Please see orders for Labs ordered in this encounter and please schedule soon.     ALSO    I would like to refer to the hepatology department for further evaluation and treatment. Please schedule.    Thank you.

## 2023-08-17 NOTE — TELEPHONE ENCOUNTER
----- Message from Silvino Zuñiga MD sent at 8/16/2023 10:07 PM CDT -----  Please call patient and explain that test(s) show mild elevation of liver tests. For now ok to continue choesterol medication.    Please see orders for Labs ordered in this encounter and please schedule soon.     ALSO    I would like to refer to the hepatology department for further evaluation and treatment. Please schedule.    Thank you.

## 2023-08-18 ENCOUNTER — OFFICE VISIT (OUTPATIENT)
Dept: HEPATOLOGY | Facility: CLINIC | Age: 63
End: 2023-08-18
Attending: FAMILY MEDICINE
Payer: COMMERCIAL

## 2023-08-18 VITALS — WEIGHT: 295.44 LBS | BODY MASS INDEX: 34.18 KG/M2 | HEIGHT: 78 IN

## 2023-08-18 DIAGNOSIS — E78.5 HYPERLIPIDEMIA, UNSPECIFIED HYPERLIPIDEMIA TYPE: ICD-10-CM

## 2023-08-18 DIAGNOSIS — R74.8 ELEVATED LIVER ENZYMES: Primary | ICD-10-CM

## 2023-08-18 DIAGNOSIS — E66.9 OBESITY (BMI 30.0-34.9): ICD-10-CM

## 2023-08-18 DIAGNOSIS — R94.5 ABNORMAL RESULTS OF LIVER FUNCTION STUDIES: ICD-10-CM

## 2023-08-18 PROCEDURE — 99999 PR PBB SHADOW E&M-EST. PATIENT-LVL V: ICD-10-PCS | Mod: PBBFAC,,, | Performed by: NURSE PRACTITIONER

## 2023-08-18 PROCEDURE — 3044F HG A1C LEVEL LT 7.0%: CPT | Mod: CPTII,S$GLB,, | Performed by: NURSE PRACTITIONER

## 2023-08-18 PROCEDURE — 1159F PR MEDICATION LIST DOCUMENTED IN MEDICAL RECORD: ICD-10-PCS | Mod: CPTII,S$GLB,, | Performed by: NURSE PRACTITIONER

## 2023-08-18 PROCEDURE — 1160F PR REVIEW ALL MEDS BY PRESCRIBER/CLIN PHARMACIST DOCUMENTED: ICD-10-PCS | Mod: CPTII,S$GLB,, | Performed by: NURSE PRACTITIONER

## 2023-08-18 PROCEDURE — 1159F MED LIST DOCD IN RCRD: CPT | Mod: CPTII,S$GLB,, | Performed by: NURSE PRACTITIONER

## 2023-08-18 PROCEDURE — 99203 OFFICE O/P NEW LOW 30 MIN: CPT | Mod: S$GLB,,, | Performed by: NURSE PRACTITIONER

## 2023-08-18 PROCEDURE — 3008F BODY MASS INDEX DOCD: CPT | Mod: CPTII,S$GLB,, | Performed by: NURSE PRACTITIONER

## 2023-08-18 PROCEDURE — 99203 PR OFFICE/OUTPT VISIT, NEW, LEVL III, 30-44 MIN: ICD-10-PCS | Mod: S$GLB,,, | Performed by: NURSE PRACTITIONER

## 2023-08-18 PROCEDURE — 99999 PR PBB SHADOW E&M-EST. PATIENT-LVL V: CPT | Mod: PBBFAC,,, | Performed by: NURSE PRACTITIONER

## 2023-08-18 PROCEDURE — 3044F PR MOST RECENT HEMOGLOBIN A1C LEVEL <7.0%: ICD-10-PCS | Mod: CPTII,S$GLB,, | Performed by: NURSE PRACTITIONER

## 2023-08-18 PROCEDURE — 1160F RVW MEDS BY RX/DR IN RCRD: CPT | Mod: CPTII,S$GLB,, | Performed by: NURSE PRACTITIONER

## 2023-08-18 PROCEDURE — 3008F PR BODY MASS INDEX (BMI) DOCUMENTED: ICD-10-PCS | Mod: CPTII,S$GLB,, | Performed by: NURSE PRACTITIONER

## 2023-08-18 NOTE — PATIENT INSTRUCTIONS
1. US soon    2. Will check immunity markers for Hepatitis A and B and arrange for vaccination if needed  3. Labs before return to clinic to  check for multiple causes of liver disease. These labs will release to you as soon as they are resulted but we will discuss them in detail at your upcoming visit to discuss what the lab results mean.   4.  Follow up in 3 weeks after labs and US

## 2023-08-18 NOTE — PROGRESS NOTES
[facility] VISIT NEW PT NOTE    REFERRING PROVIDER:  Dr. Silvino Zuñiga  PCP: Silvino Zuñiga MD     CHIEF COMPLAINT: elevated liver enzymes     HPI: This is a 63 y.o. male with PMH noted below, presenting for evaluation of elevated liver enzymes    Previous serologic w/u - will obtain limited w/u    Prior serologic workup:   Lab Results   Component Value Date    HEPBSAG Negative 12/12/2016    HEPCAB Negative 12/12/2016    HEPCAB Negative 12/12/2016    HEPAIGM Negative 12/12/2016       Liver fibrosis staging:  -- fibroscan if liver enzymes remain elevated or if US notes fatty liver     Risk factors for NAFLD include obesity, HLD    Interval HPI: Presents today alone. No significant weight gain in the past 2 years. Current weight 295 lbs. Taking multiple OTC vitamins, Rohith Men with niacin (unsure if it is related)        Labs done 7/2023 show elevated transaminase levels (ALT > AST, elevated x2 in May and July, otherwise normal before then )  Platelets WNL, alk phos WNL  Synthetic liver functioning WNL    Lab Results   Component Value Date    ALT 80 (H) 07/12/2023    AST 53 (H) 07/12/2023    ALKPHOS 90 05/15/2023    BILITOT 0.3 05/15/2023    ALBUMIN 4.1 05/15/2023    INR 1.0 09/18/2011     03/01/2023       Abd U/S done 2021 showed normal liver - will repeat     Denies family history of liver disease . Denies significant alcohol consumption currently or in the past-   Social History     Substance and Sexual Activity   Alcohol Use Yes    Comment: couple of times per month         Immunity to Hep A and B - will check with next labs         Allergy and medication list reviewed and updated     PMHX:  has a past medical history of Chronic urticaria (11/12/2014), Colonic polyp, Malignant neoplasm of thyroid gland, Obesity, Post-surgical hypothyroidism, PUD (peptic ulcer disease), Thyroid cancer, Thyroid cancer, and Urticaria.    PSHX:  has a past surgical history that includes Knee surgery  "(8/2013); Thyroidectomy; and Colonoscopy (N/A, 2/5/2018).    FAMILY HISTORY: Updated and reviewed in EPIC    SOCIAL HISTORY:   Social History     Substance and Sexual Activity   Alcohol Use Yes    Comment: couple of times per month       Social History     Substance and Sexual Activity   Drug Use No       ROS:   GENERAL: Denies fatigue  CARDIOVASCULAR: Denies edema  GI: Denies abdominal pain  SKIN: Denies rash, itching   NEURO: Denies confusion, memory loss, or mood changes    PHYSICAL EXAM:   Friendly Black or  male, in no acute distress; alert and oriented to person, place and time  VITALS: Ht 6' 6" (1.981 m)   Wt 134 kg (295 lb 6.7 oz)   BMI 34.14 kg/m²   EYES: Sclerae anicteric  GI: Soft, non-tender, non-distended. No ascites.  EXTREMITIES:  No edema.  SKIN: Warm and dry. No jaundice. No telangectasias noted. No palmar erythema.  NEURO:  No asterixis.  PSYCH:  Thought and speech pattern appropriate. Behavior normal      EDUCATION:  See instructions discussed with patient in Instructions section of the After Visit Summary     ASSESSMENT & PLAN:  63 y.o. male with:  1. Elevated liver enzymes   -- Labs note elevated transaminase levels since May - will repeat with limited sero w/u  --- synthetic liver function WNL  --- Abd US done in 2021 showed normal liver, will repeat   -- do not suspect any medications contributing, except possibly niacin in Rohith Men (but lower suspciion)  --- previous serological work up : will obtain   --- Hep A and B immunity: see HPI  -- labs before RTC  Orders Placed This Encounter   Procedures    US Abdomen Complete    CK    Hepatitis Panel, Acute    Hepatic Function Panel    Hepatitis A antibody, IgG    Hepatitis B Core Antibody, Total    Hepatitis B Surface Ab, Qualitative      -- fibroscan if labs remain elevated or US notes fatty liver     2. Obesity, HLD  Body mass index is 34.14 kg/m².  Can increase the risk of fatty liver         Follow up in about 3 weeks (around " 9/8/2023). with US and labs before  Orders Placed This Encounter   Procedures    US Abdomen Complete    CK    Hepatitis Panel, Acute    Hepatic Function Panel    Hepatitis A antibody, IgG    Hepatitis B Core Antibody, Total    Hepatitis B Surface Ab, Qualitative        Thank you for allowing me to participate in the care of Zaheer SealsDRAKE Calvert    I spent a total of 30 minutes on the day of the visit.This includes face to face time and non-face to face time preparing to see the patient (eg, review of tests), obtaining and/or reviewing separately obtained history, documenting clinical information in the electronic or other health record, independently interpreting results and communicating results to the patient/family/caregiver, and coordinating care.         CC'ed note to:   Silvino Zuñiga MD

## 2023-09-06 ENCOUNTER — HOSPITAL ENCOUNTER (OUTPATIENT)
Dept: RADIOLOGY | Facility: HOSPITAL | Age: 63
Discharge: HOME | End: 2023-09-06
Attending: NURSE PRACTITIONER
Payer: COMMERCIAL

## 2023-09-06 DIAGNOSIS — R74.8 ELEVATED LIVER ENZYMES: ICD-10-CM

## 2023-09-06 PROCEDURE — 76700 US EXAM ABDOM COMPLETE: CPT | Mod: TC,PO

## 2023-09-06 PROCEDURE — 76700 US EXAM ABDOM COMPLETE: CPT | Mod: 26,,, | Performed by: RADIOLOGY

## 2023-09-06 PROCEDURE — 76700 US ABDOMEN COMPLETE: ICD-10-PCS | Mod: 26,,, | Performed by: RADIOLOGY

## 2023-09-12 ENCOUNTER — OFFICE VISIT (OUTPATIENT)
Dept: HEPATOLOGY | Facility: CLINIC | Age: 63
End: 2023-09-12
Payer: COMMERCIAL

## 2023-09-12 VITALS — HEIGHT: 78 IN | BODY MASS INDEX: 34.33 KG/M2 | WEIGHT: 296.75 LBS

## 2023-09-12 DIAGNOSIS — Z23 NEED FOR HEPATITIS B VACCINATION: ICD-10-CM

## 2023-09-12 DIAGNOSIS — R74.8 ELEVATED LIVER ENZYMES: Primary | ICD-10-CM

## 2023-09-12 DIAGNOSIS — R74.8 ELEVATED CK: ICD-10-CM

## 2023-09-12 PROCEDURE — 3008F PR BODY MASS INDEX (BMI) DOCUMENTED: ICD-10-PCS | Mod: CPTII,S$GLB,, | Performed by: NURSE PRACTITIONER

## 2023-09-12 PROCEDURE — 99999 PR PBB SHADOW E&M-EST. PATIENT-LVL IV: ICD-10-PCS | Mod: PBBFAC,,, | Performed by: NURSE PRACTITIONER

## 2023-09-12 PROCEDURE — 99214 OFFICE O/P EST MOD 30 MIN: CPT | Mod: S$GLB,,, | Performed by: NURSE PRACTITIONER

## 2023-09-12 PROCEDURE — 99214 PR OFFICE/OUTPT VISIT, EST, LEVL IV, 30-39 MIN: ICD-10-PCS | Mod: S$GLB,,, | Performed by: NURSE PRACTITIONER

## 2023-09-12 PROCEDURE — 1160F RVW MEDS BY RX/DR IN RCRD: CPT | Mod: CPTII,S$GLB,, | Performed by: NURSE PRACTITIONER

## 2023-09-12 PROCEDURE — 1159F MED LIST DOCD IN RCRD: CPT | Mod: CPTII,S$GLB,, | Performed by: NURSE PRACTITIONER

## 2023-09-12 PROCEDURE — 1160F PR REVIEW ALL MEDS BY PRESCRIBER/CLIN PHARMACIST DOCUMENTED: ICD-10-PCS | Mod: CPTII,S$GLB,, | Performed by: NURSE PRACTITIONER

## 2023-09-12 PROCEDURE — 99999 PR PBB SHADOW E&M-EST. PATIENT-LVL IV: CPT | Mod: PBBFAC,,, | Performed by: NURSE PRACTITIONER

## 2023-09-12 PROCEDURE — 3044F HG A1C LEVEL LT 7.0%: CPT | Mod: CPTII,S$GLB,, | Performed by: NURSE PRACTITIONER

## 2023-09-12 PROCEDURE — 3008F BODY MASS INDEX DOCD: CPT | Mod: CPTII,S$GLB,, | Performed by: NURSE PRACTITIONER

## 2023-09-12 PROCEDURE — 1159F PR MEDICATION LIST DOCUMENTED IN MEDICAL RECORD: ICD-10-PCS | Mod: CPTII,S$GLB,, | Performed by: NURSE PRACTITIONER

## 2023-09-12 PROCEDURE — 3044F PR MOST RECENT HEMOGLOBIN A1C LEVEL <7.0%: ICD-10-PCS | Mod: CPTII,S$GLB,, | Performed by: NURSE PRACTITIONER

## 2023-09-12 RX ORDER — CEPHALEXIN 250 MG/1
500 CAPSULE ORAL 2 TIMES DAILY
COMMUNITY
Start: 2023-09-08 | End: 2024-03-12

## 2023-09-12 RX ORDER — PREDNISONE 10 MG/1
40 TABLET ORAL
COMMUNITY
Start: 2023-09-08 | End: 2024-03-12

## 2023-09-12 RX ORDER — HEPATITIS B VACCINE (RECOMBINANT) ADJUVANTED 20 UG/.5ML
0.5 INJECTION, SOLUTION INTRAMUSCULAR ONCE
Qty: 0.5 ML | Refills: 1 | Status: SHIPPED | OUTPATIENT
Start: 2023-09-12 | End: 2023-09-12

## 2023-09-12 NOTE — PATIENT INSTRUCTIONS
1. Fibroscan to look for fat or scar tissue in the liver with return to clinic   2. Will check immunity markers for Hepatitis A and B and arrange for vaccination if needed  3. Labs before return to clinic to  check for multiple causes of liver disease. These labs will release to you as soon as they are resulted but we will discuss them in detail at your upcoming visit to discuss what the lab results mean.   4.  Follow up in 6 months with fibroscan same day

## 2023-09-12 NOTE — PROGRESS NOTES
[facility] VISIT FOLLOW UP NOTE    PCP: Silvino Zuñiga MD     CHIEF COMPLAINT: elevated liver enzymes     HPI: This is a 63 y.o. male with PMH noted below, presenting for follow up of elevated liver enzymes    Previous serologic w/u negative for Hep A, B and C  Mildly elevated CK (276)    Prior serologic workup:   Lab Results   Component Value Date    HEPBSAG Non-reactive 09/06/2023    HEPCAB Non-reactive 09/06/2023    HEPAIGM Non-reactive 09/06/2023       Liver fibrosis staging:  -- fibroscan with RTC    Risk factors for NAFLD include obesity, HLD    Interval HPI: Presents today alone. Taking multiple OTC vitamins, Rohith Men with niacin (unsure if it is related)  US normal, no fatty liver noted  Labs trending down but remain mildly elevated   CK mildly elevated       Labs done 9/2023 show elevated transaminase levels (ALT > AST, elevated May-Sept, otherwise normal before then )  Platelets WNL, alk phos WNL  Synthetic liver functioning WNL    Lab Results   Component Value Date    ALT 45 (H) 09/06/2023    AST 43 09/06/2023    ALKPHOS 67 09/06/2023    BILITOT 0.3 09/06/2023    ALBUMIN 4.1 09/06/2023    INR 1.0 09/18/2011     03/01/2023       Abd U/S done 8/2023 showed normal liver     Denies family history of liver disease . Denies significant alcohol consumption currently or in the past-   Social History     Substance and Sexual Activity   Alcohol Use Yes    Comment: couple of times per month         Immunity to Hep A and B - will check with next labs         Allergy and medication list reviewed and updated     PMHX:  has a past medical history of Chronic urticaria (11/12/2014), Colonic polyp, Malignant neoplasm of thyroid gland, Obesity, Post-surgical hypothyroidism, PUD (peptic ulcer disease), and Urticaria.    PSHX:  has a past surgical history that includes Knee surgery (8/2013); Thyroidectomy; and Colonoscopy (N/A, 2/5/2018).    FAMILY HISTORY: Updated and reviewed in [facility]    SOCIAL  "HISTORY:   Social History     Substance and Sexual Activity   Alcohol Use Yes    Comment: couple of times per month       Social History     Substance and Sexual Activity   Drug Use No       ROS:   GENERAL: Denies fatigue  CARDIOVASCULAR: Denies edema  GI: Denies abdominal pain  SKIN: Denies rash, itching   NEURO: Denies confusion, memory loss, or mood changes    PHYSICAL EXAM:   Friendly Black or  male, in no acute distress; alert and oriented to person, place and time  VITALS: Ht 6' 6" (1.981 m)   Wt 134.6 kg (296 lb 11.8 oz)   BMI 34.29 kg/m²   EYES: Sclerae anicteric  GI: Soft, non-tender, non-distended. No ascites.  EXTREMITIES:  No edema.  SKIN: Warm and dry. No jaundice. No telangectasias noted. No palmar erythema.  NEURO:  No asterixis.  PSYCH:  Thought and speech pattern appropriate. Behavior normal      EDUCATION:  See instructions discussed with patient in Instructions section of the After Visit Summary     ASSESSMENT & PLAN:  63 y.o. male with:  1. Elevated liver enzymes   -- trending down but remains mildly elevated, will repeat in 6 months with full sero w/u  --- synthetic liver function WNL  --- Abd US noted normal liver  -- do not suspect any medications contributing, except possibly niacin in Rohith Men (but lower suspciion) although labs improving without holding so may not be contributing ?  --- previous serological work up : see HPI  --- Hep A and B immunity: see HPI  -- labs and fibroscan with RTC given continued elevated liver enzymes     2. CK elevation  -- mild, should not be contributing to elevated liver enzymes currently (unsure of chronicity)      Follow up in about 6 months (around 3/12/2024). with labs and fibroscan before  Orders Placed This Encounter   Procedures    FibroScan [place] (Vibration Controlled Transient Elastography)    Hepatic Function Panel    CK    Alpha-1-Antitrypsin    KENNY Screen w/Reflex    Antimitochondrial Antibody    Anti-Smooth Muscle Antibody "    Ceruloplasmin    IgG    Iron and TIBC        Thank you for allowing me to participate in the care of Zaheer Neal    DRAKE Ramirez    I spent a total of 30 minutes on the day of the visit.This includes face to face time and non-face to face time preparing to see the patient (eg, review of tests), obtaining and/or reviewing separately obtained history, documenting clinical information in the electronic or other health record, independently interpreting results and communicating results to the patient/family/caregiver, and coordinating care.         CC'ed note to:   Silvino Zuñiga MD

## 2023-09-25 ENCOUNTER — CLINICAL SUPPORT (OUTPATIENT)
Dept: ENDOSCOPY | Facility: HOSPITAL | Age: 63
End: 2023-09-25
Attending: FAMILY MEDICINE
Payer: COMMERCIAL

## 2023-09-25 DIAGNOSIS — Z12.11 COLON CANCER SCREENING: ICD-10-CM

## 2023-09-25 NOTE — PLAN OF CARE
Spoke to pt. He wants to schedule for January and wants [facility]. Offered [facility] for December but pt. Stated he will call back. Phone number provided.

## 2023-09-28 ENCOUNTER — PATIENT MESSAGE (OUTPATIENT)
Dept: INTERNAL MEDICINE | Facility: CLINIC | Age: 63
End: 2023-09-28
Payer: COMMERCIAL

## 2023-09-28 ENCOUNTER — LAB VISIT (OUTPATIENT)
Dept: LAB | Facility: HOSPITAL | Age: 63
End: 2023-09-28
Attending: FAMILY MEDICINE
Payer: COMMERCIAL

## 2023-09-28 DIAGNOSIS — E78.5 HYPERLIPIDEMIA, UNSPECIFIED HYPERLIPIDEMIA TYPE: ICD-10-CM

## 2023-09-28 LAB
ALT SERPL W/O P-5'-P-CCNC: 37 U/L (ref 10–44)
AST SERPL-CCNC: 42 U/L (ref 15–46)
CHOLEST SERPL-MCNC: 87 MG/DL (ref 120–199)
CHOLEST/HDLC SERPL: 2.7 {RATIO} (ref 2–5)
HDLC SERPL-MCNC: 32 MG/DL (ref 40–75)
HDLC SERPL: 36.8 % (ref 20–50)
LDLC SERPL CALC-MCNC: 41.8 MG/DL (ref 63–159)
NONHDLC SERPL-MCNC: 55 MG/DL
TRIGL SERPL-MCNC: 66 MG/DL (ref 30–150)

## 2023-09-28 PROCEDURE — 84460 ALANINE AMINO (ALT) (SGPT): CPT | Mod: PN | Performed by: FAMILY MEDICINE

## 2023-09-28 PROCEDURE — 36415 COLL VENOUS BLD VENIPUNCTURE: CPT | Mod: PN | Performed by: FAMILY MEDICINE

## 2023-09-28 PROCEDURE — 84450 TRANSFERASE (AST) (SGOT): CPT | Mod: PN | Performed by: FAMILY MEDICINE

## 2023-09-28 PROCEDURE — 80061 LIPID PANEL: CPT | Performed by: FAMILY MEDICINE

## 2023-10-05 ENCOUNTER — TELEPHONE (OUTPATIENT)
Dept: ENDOSCOPY | Facility: HOSPITAL | Age: 63
End: 2023-10-05
Payer: COMMERCIAL

## 2023-10-05 VITALS — WEIGHT: 296 LBS | BODY MASS INDEX: 34.25 KG/M2 | HEIGHT: 78 IN

## 2023-10-05 DIAGNOSIS — Z12.11 COLON CANCER SCREENING: Primary | ICD-10-CM

## 2023-10-05 DIAGNOSIS — Z86.010 HISTORY OF COLON POLYPS: ICD-10-CM

## 2023-10-05 RX ORDER — SODIUM, POTASSIUM,MAG SULFATES 17.5-3.13G
1 SOLUTION, RECONSTITUTED, ORAL ORAL DAILY
Qty: 1 KIT | Refills: 0 | Status: SHIPPED | OUTPATIENT
Start: 2023-10-05 | End: 2023-10-07

## 2023-10-05 NOTE — TELEPHONE ENCOUNTER
Spoke to pt to schedule procedure(s) Colonoscopy       Physician to perform procedure(s) Dr. KVNG Gómez  Date of Procedure (s) 1/4/24  Arrival Time 10:00 AM  Time of Procedure(s) 11:00 AM   Location of Procedure(s) [facility] [address] [facility]  Type of Rx Prep sent to patient: Suprep  Instructions provided to patient via [facility]    Patient was informed on the following information and verbalized understanding. Screening questionnaire reviewed with patient and complete. If procedure requires anesthesia, a responsible adult needs to be present to accompany the patient home, patient cannot drive after receiving anesthesia. Appointment details are tentative, especially check-in time. Patient will receive a prep-op call 4 days prior to confirm check-in time for procedure. If applicable the patient should contact their pharmacy to verify Rx for procedure prep is ready for pick-up. Patient was advised to call the scheduling department at [phone] if pharmacy states no Rx is available. Patient was advised to call the endoscopy scheduling department if any questions or concerns arise.      [facility] Scheduling Department

## 2023-10-05 NOTE — PLAN OF CARE
Spoke to pt to schedule procedure(s) Colonoscopy       Physician to perform procedure(s) Dr. KVNG Gómez  Date of Procedure (s) 1/4/24  Arrival Time 10:00 AM  Time of Procedure(s) 11:00 AM   Location of Procedure(s) [facility] [address] [facility]  Type of Rx Prep sent to patient: Suprep  Instructions provided to patient via [facility]    Patient was informed on the following information and verbalized understanding. Screening questionnaire reviewed with patient and complete. If procedure requires anesthesia, a responsible adult needs to be present to accompany the patient home, patient cannot drive after receiving anesthesia. Appointment details are tentative, especially check-in time. Patient will receive a prep-op call 4 days prior to confirm check-in time for procedure. If applicable the patient should contact their pharmacy to verify Rx for procedure prep is ready for pick-up. Patient was advised to call the scheduling department at [phone] if pharmacy states no Rx is available. Patient was advised to call the endoscopy scheduling department if any questions or concerns arise.      [facility] Scheduling Department

## 2023-10-16 ENCOUNTER — TELEPHONE (OUTPATIENT)
Dept: SPORTS MEDICINE | Facility: CLINIC | Age: 63
End: 2023-10-16
Payer: COMMERCIAL

## 2023-10-26 ENCOUNTER — HOSPITAL ENCOUNTER (OUTPATIENT)
Dept: RADIOLOGY | Facility: HOSPITAL | Age: 63
Discharge: HOME | End: 2023-10-26
Attending: ORTHOPAEDIC SURGERY
Payer: COMMERCIAL

## 2023-10-26 ENCOUNTER — OFFICE VISIT (OUTPATIENT)
Dept: SPORTS MEDICINE | Facility: CLINIC | Age: 63
End: 2023-10-26
Payer: COMMERCIAL

## 2023-10-26 VITALS
SYSTOLIC BLOOD PRESSURE: 116 MMHG | HEART RATE: 79 BPM | WEIGHT: 293.19 LBS | BODY MASS INDEX: 33.92 KG/M2 | HEIGHT: 78 IN | DIASTOLIC BLOOD PRESSURE: 75 MMHG

## 2023-10-26 DIAGNOSIS — M25.562 PAIN IN BOTH KNEES, UNSPECIFIED CHRONICITY: Primary | ICD-10-CM

## 2023-10-26 DIAGNOSIS — M25.561 PAIN IN BOTH KNEES, UNSPECIFIED CHRONICITY: ICD-10-CM

## 2023-10-26 DIAGNOSIS — M25.562 PAIN IN BOTH KNEES, UNSPECIFIED CHRONICITY: ICD-10-CM

## 2023-10-26 DIAGNOSIS — M25.561 PAIN IN BOTH KNEES, UNSPECIFIED CHRONICITY: Primary | ICD-10-CM

## 2023-10-26 DIAGNOSIS — M17.0 PRIMARY OSTEOARTHRITIS OF BOTH KNEES: ICD-10-CM

## 2023-10-26 PROCEDURE — 1159F MED LIST DOCD IN RCRD: CPT | Mod: CPTII,S$GLB,, | Performed by: ORTHOPAEDIC SURGERY

## 2023-10-26 PROCEDURE — 3008F PR BODY MASS INDEX (BMI) DOCUMENTED: ICD-10-PCS | Mod: CPTII,S$GLB,, | Performed by: ORTHOPAEDIC SURGERY

## 2023-10-26 PROCEDURE — 3044F PR MOST RECENT HEMOGLOBIN A1C LEVEL <7.0%: ICD-10-PCS | Mod: CPTII,S$GLB,, | Performed by: ORTHOPAEDIC SURGERY

## 2023-10-26 PROCEDURE — 3078F PR MOST RECENT DIASTOLIC BLOOD PRESSURE < 80 MM HG: ICD-10-PCS | Mod: CPTII,S$GLB,, | Performed by: ORTHOPAEDIC SURGERY

## 2023-10-26 PROCEDURE — 3074F SYST BP LT 130 MM HG: CPT | Mod: CPTII,S$GLB,, | Performed by: ORTHOPAEDIC SURGERY

## 2023-10-26 PROCEDURE — 3074F PR MOST RECENT SYSTOLIC BLOOD PRESSURE < 130 MM HG: ICD-10-PCS | Mod: CPTII,S$GLB,, | Performed by: ORTHOPAEDIC SURGERY

## 2023-10-26 PROCEDURE — 3044F HG A1C LEVEL LT 7.0%: CPT | Mod: CPTII,S$GLB,, | Performed by: ORTHOPAEDIC SURGERY

## 2023-10-26 PROCEDURE — 1159F PR MEDICATION LIST DOCUMENTED IN MEDICAL RECORD: ICD-10-PCS | Mod: CPTII,S$GLB,, | Performed by: ORTHOPAEDIC SURGERY

## 2023-10-26 PROCEDURE — 99999 PR PBB SHADOW E&M-EST. PATIENT-LVL IV: ICD-10-PCS | Mod: PBBFAC,,, | Performed by: ORTHOPAEDIC SURGERY

## 2023-10-26 PROCEDURE — 73564 XR KNEE ORTHO BILAT WITH FLEXION: ICD-10-PCS | Mod: 26,,, | Performed by: RADIOLOGY

## 2023-10-26 PROCEDURE — 73564 X-RAY EXAM KNEE 4 OR MORE: CPT | Mod: 26,,, | Performed by: RADIOLOGY

## 2023-10-26 PROCEDURE — 99213 OFFICE O/P EST LOW 20 MIN: CPT | Mod: S$GLB,,, | Performed by: ORTHOPAEDIC SURGERY

## 2023-10-26 PROCEDURE — 3078F DIAST BP <80 MM HG: CPT | Mod: CPTII,S$GLB,, | Performed by: ORTHOPAEDIC SURGERY

## 2023-10-26 PROCEDURE — 3008F BODY MASS INDEX DOCD: CPT | Mod: CPTII,S$GLB,, | Performed by: ORTHOPAEDIC SURGERY

## 2023-10-26 PROCEDURE — 99213 PR OFFICE/OUTPT VISIT, EST, LEVL III, 20-29 MIN: ICD-10-PCS | Mod: S$GLB,,, | Performed by: ORTHOPAEDIC SURGERY

## 2023-10-26 PROCEDURE — 99999 PR PBB SHADOW E&M-EST. PATIENT-LVL IV: CPT | Mod: PBBFAC,,, | Performed by: ORTHOPAEDIC SURGERY

## 2023-10-26 PROCEDURE — 73564 X-RAY EXAM KNEE 4 OR MORE: CPT | Mod: TC,50

## 2023-10-26 NOTE — PROGRESS NOTES
CC: Right knee pain    63 y.o. Male with a 3 month history of bilateral knee atraumatic pain.  Patient works at [employer] [place]. Right knee meniscectomy and loose body removal   He states that the pain is severe and not responding to any conservative care.      He reports that the pain and weakness. It also bothers him at night.    - mechanical symptoms, - instability    Is affecting ADLs.  Pain is 6/10 at it's worst.    REVIEW OF SYSTEMS:   Constitution: Negative. Negative for chills, fever and night sweats.   HENT: Negative for congestion and headaches.    Eyes: Negative for blurred vision, left vision loss and right vision loss.   Cardiovascular: Negative for chest pain and syncope.   Respiratory: Negative for cough and shortness of breath.    Endocrine: Negative for polydipsia, polyphagia and polyuria.   Hematologic/Lymphatic: Negative for bleeding problem. Does not bruise/bleed easily.   Skin: Negative for dry skin, itching and rash.   Musculoskeletal: Negative for falls. Positive for right knee pain and  muscle weakness.   Gastrointestinal: Negative for abdominal pain and bowel incontinence.   Genitourinary: Negative for bladder incontinence and nocturia.   Neurological: Negative for disturbances in coordination, loss of balance and seizures.   Psychiatric/Behavioral: Negative for depression. The patient does not have insomnia.    Allergic/Immunologic: Negative for hives and persistent infections.     PAST MEDICAL HISTORY:   Past Medical History:   Diagnosis Date    Chronic urticaria 11/12/2014    Colonic polyp     Malignant neoplasm of thyroid gland     thyroid    Obesity     Post-surgical hypothyroidism     PUD (peptic ulcer disease)     Urticaria        PAST SURGICAL HISTORY:   Past Surgical History:   Procedure Laterality Date    COLONOSCOPY N/A 2/5/2018    Procedure: COLONOSCOPY;  Surgeon: ABHISHEK Hitchcock MD;  Location: [address]);  Service: Endoscopy;  Laterality: N/A;  confirmed appt.     KNEE SURGERY  8/2013    right knee-includes scope    THYROIDECTOMY         FAMILY HISTORY:   Family History   Problem Relation Age of Onset    Breast cancer Mother     Cancer Mother         breast    Breast cancer Sister     Cancer Sister         breast    Heart disease Father 64        MI    Diabetes Neg Hx     Thyroid cancer Neg Hx        SOCIAL HISTORY:   Social History     Socioeconomic History    Marital status:      Spouse name: Aubrie    Number of children: 2   Tobacco Use    Smoking status: Never    Smokeless tobacco: Never   Substance and Sexual Activity    Alcohol use: Yes     Comment: couple of times per month    Drug use: No    Sexual activity: Yes     Partners: Female   Social History Narrative    RM x 3, 2 kids,  [employer] [employer], played [employer], [place], [employer], etc. [employer] for college. Born in [facility]     Social Determinants of Health     Financial Resource Strain: Low Risk  (10/25/2023)    Overall Financial Resource Strain (CARDIA)     Difficulty of Paying Living Expenses: Not hard at all   Food Insecurity: No Food Insecurity (10/25/2023)    Hunger Vital Sign     Worried About Running Out of Food in the Last Year: Never true     Ran Out of Food in the Last Year: Never true   Transportation Needs: No Transportation Needs (10/25/2023)    PRAPARE - Transportation     Lack of Transportation (Medical): No     Lack of Transportation (Non-Medical): No   Physical Activity: Insufficiently Active (10/25/2023)    Exercise Vital Sign     Days of Exercise per Week: 1 day     Minutes of Exercise per Session: 30 min   Stress: No Stress Concern Present (10/25/2023)    [employer] - Occupational Stress Questionnaire     Feeling of Stress : Not at all   Social Connections: Unknown (10/25/2023)    Social Connection and Isolation Panel [NHANES]     Frequency of Communication with Friends and Family: Once a week     Frequency of Social Gatherings with Friends and Family: Once a  week     Active Member of Clubs or Organizations: No     Attends Club or Organization Meetings: Never     Marital Status:    Housing Stability: Low Risk  (10/25/2023)    Housing Stability Vital Sign     Unable to Pay for Housing in the Last Year: No     Number of Places Lived in the Last Year: 1     Unstable Housing in the Last Year: No       MEDICATIONS:     Current Outpatient Medications:     cetirizine (ZYRTEC) 10 MG tablet, Take 10 mg by mouth., Disp: , Rfl:     rosuvastatin (CRESTOR) 20 MG tablet, Take 1 tablet (20 mg total) by mouth once daily., Disp: 90 tablet, Rfl: 1    tamsulosin (FLOMAX) 0.4 mg Cap, Take 1 capsule (0.4 mg total) by mouth once daily., Disp: 90 capsule, Rfl: 3    cephALEXin (KEFLEX) 250 MG capsule, Take 500 mg by mouth 2 (two) times daily., Disp: , Rfl:     diclofenac sodium (VOLTAREN) 1 % Gel, Apply 2 g topically 4 (four) times daily. To areas of foot pain (Patient not taking: Reported on 10/26/2023), Disp: 100 g, Rfl: 3    EPINEPHrine (EPIPEN) 0.3 mg/0.3 mL AtIn, Inject 0.3 mLs (0.3 mg total) into the muscle once. for 1 dose, Disp: 1 each, Rfl: 0    famotidine (PEPCID) 20 MG tablet, Take 1 tablet (20 mg total) by mouth 2 (two) times daily. (Patient not taking: Reported on 10/26/2023), Disp: 60 tablet, Rfl: 1    fluticasone propionate (FLONASE) 50 mcg/actuation nasal spray, 1 spray (50 mcg total) by Each Nostril route 2 (two) times a day. (Patient not taking: Reported on 10/26/2023), Disp: 16 g, Rfl: 0    guaiFENesin-codeine 100-10 mg/5 ml (TUSSI-ORGANIDIN NR)  mg/5 mL syrup, Take by mouth., Disp: , Rfl:     hydrOXYzine HCL (ATARAX) 25 MG tablet, 1 to 8 tablets at bedtime.  Start with 3 tablets.  Increase or decrease as needed until itching is controlled or a maximum of 8 tablets. (Patient not taking: Reported on 10/26/2023), Disp: 240 tablet, Rfl: 3    Lactobacillus rhamnosus GG (CULTURELLE) 10 billion cell capsule, Take 1 capsule by mouth once daily., Disp: , Rfl:     POLY  "HIST FORTE 10.5-10 mg Tab, Take 1 tablet by mouth every 8 (eight) hours as needed., Disp: , Rfl:     predniSONE (DELTASONE) 10 MG tablet, Take 40 mg by mouth., Disp: , Rfl:     sildenafiL (VIAGRA) 50 MG tablet, Take 1 tablet (50 mg total) by mouth daily as needed for Erectile Dysfunction. (Patient not taking: Reported on 10/26/2023), Disp: 30 tablet, Rfl: 2    TIROSINT 150 mcg Cap, Take 150 mcg by mouth once daily. (Patient not taking: Reported on 10/26/2023), Disp: 90 capsule, Rfl: 3    ALLERGIES:   Review of patient's allergies indicates:   Allergen Reactions    Shellfish containing products Hives       VITAL SIGNS:   /75   Pulse 79   Ht 6' 6" (1.981 m)   Wt 133 kg (293 lb 3.4 oz)   BMI 33.88 kg/m²      PHYSICAL EXAMINATION:  General:  The patient is alert and oriented x 3.  Mood is pleasant.  Observation of ears, eyes and nose reveal no gross abnormalities.  No labored breathing observed.    Bilateral KNEE EXAMINATION     OBSERVATION / INSPECTION   Gait:   Nonantalgic   Alignment:  Neutral   Scars:   None   Muscle atrophy: Mild  Effusion:  None   Warmth:  None   Discoloration:   none     TENDERNESS / CREPITUS (T / C):          T / C      T / C   Patella   - / -   Lateral joint line   - / -   Peripatellar medial  -  Medial joint line    + / -   Peripatellar lateral -  Medial plica   - / -   Patellar tendon -   Popliteal fossa  - / -   Quad tendon   -   Gastrocnemius   -   Prepatellar Bursa - / -   Quadricep   -   Tibial tubercle  -  Thigh/hamstring  -   Pes anserine/HS -  Fibula    -   ITB   - / -  Tibia     -   Tib/fib joint  - / -  LCL    -     MFC   - / -   MCL: Proximal  -    LFC   - / -    Distal   -          ROM: (* = pain)  PASSIVE   ACTIVE    Left :   5 / 0 / 125   5 / 0 / 120     Right :    5 / 0 / 100   5 / 0 / 100    Patellofemoral examination:  See above noted areas of tenderness.   Patella position    Subluxation / dislocation: Centered           Sup. / Inf;   Normal   Crepitus " (PF):    Absent   Patellar Mobility:       Medial-lateral:   Normal    Superior-inferior:  Normal    Inferior tilt   Normal    Patellar tendon:  Normal   Lateral tilt:    Normal   J-sign:     None   Patellofemoral grind:   No pain       MENISCAL SIGNS:     Pain on terminal extension:  -  Pain on terminal flexion:  -  Corys maneuver:  - (for )  Squat     - (for )    LIGAMENT EXAMINATION:  ACL / Lachman:  normal (-1 to 2mm)    PCL-Post.  drawer: normal 0 to 2mm  MCL- Valgus:  normal 0 to 2mm  LCL- Varus:  normal 0 to 2mm  Pivot shift: normal (Equal)   Dial Test: difference c/w other side   At 30° flexion: normal (< 5°)    At 90° flexion: normal (< 5°)   Reverse Pivot Shift:   normal (Equal)     STRENGTH: (* = with pain) PAINFUL SIDE   Quadricep   5/5   Hamstrin/5    EXTREMITY NEURO-VASCULAR EXAMINATION:   Sensation:  Grossly intact to light touch all dermatomal regions.   Motor Function:  Fully intact motor function at hip, knee, foot and ankle    DTRs;  quadriceps and  achilles 2+.  No clonus and downgoing Babinski.    Vascular status:  DP and PT pulses 2+, brisk capillary refill, symmetric.     OTHER FINDINGS:  none    IMAGING:    X-rays including standing, weight bearing AP and flexion bilateral knees, lateral and merchant views ordered and images reviewed by me show:    No fracture, dislocation or other pathology   Medial compartment: R knee Grade 2 changes. L knee Grade 4 changes   Lateral compartment: Bilateral Grade 2 changes   Patellofemoral compartment: Mild bilateral DJD        ASSESSMENT:    Right Knee Pain, bilateral DJD  1. Pain in both knees, unspecified chronicity    2. Primary osteoarthritis of both knees        PLAN:   1. PT  2. F/u 3 months      All questions were answered, pt will contact us for questions or concerns in the interim.

## 2023-11-15 ENCOUNTER — CLINICAL SUPPORT (OUTPATIENT)
Dept: REHABILITATION | Facility: HOSPITAL | Age: 63
End: 2023-11-15
Payer: COMMERCIAL

## 2023-11-15 DIAGNOSIS — M25.561 CHRONIC PAIN OF BOTH KNEES: ICD-10-CM

## 2023-11-15 DIAGNOSIS — M25.661 KNEE JOINT STIFFNESS, BILATERAL: Primary | ICD-10-CM

## 2023-11-15 DIAGNOSIS — G89.29 CHRONIC PAIN OF BOTH KNEES: ICD-10-CM

## 2023-11-15 DIAGNOSIS — M25.562 PAIN IN BOTH KNEES, UNSPECIFIED CHRONICITY: ICD-10-CM

## 2023-11-15 DIAGNOSIS — M25.662 KNEE JOINT STIFFNESS, BILATERAL: Primary | ICD-10-CM

## 2023-11-15 DIAGNOSIS — M17.0 PRIMARY OSTEOARTHRITIS OF BOTH KNEES: ICD-10-CM

## 2023-11-15 DIAGNOSIS — M25.562 CHRONIC PAIN OF BOTH KNEES: ICD-10-CM

## 2023-11-15 DIAGNOSIS — M25.561 PAIN IN BOTH KNEES, UNSPECIFIED CHRONICITY: ICD-10-CM

## 2023-11-15 PROCEDURE — 97110 THERAPEUTIC EXERCISES: CPT | Mod: PO

## 2023-11-15 PROCEDURE — 97162 PT EVAL MOD COMPLEX 30 MIN: CPT | Mod: PO

## 2023-11-15 PROCEDURE — 97530 THERAPEUTIC ACTIVITIES: CPT | Mod: PO

## 2023-11-15 NOTE — PLAN OF CARE
[facility]   Physical Therapy Initial Evaluation      Date: 11/15/2023   Name: Zaheer Neal  Clinic Number: [id number]    Therapy Diagnosis:   Encounter Diagnoses   Name Primary?    Pain in both knees, unspecified chronicity     Primary osteoarthritis of both knees     Knee joint stiffness, bilateral Yes    Chronic pain of both knees       Physician: Zaheer Metcalf MD     Physician Orders: PT Eval and Treat  Medical Diagnosis from Referral:   Diagnosis   M25.561,M25.562 (ICD-10-CM) - Pain in both knees, unspecified chronicity   M17.0 (ICD-10-CM) - Primary osteoarthritis of both knees     Evaluation Date: 11/15/2023  Authorization Period Expiration: 12/31/2023  Plan of Care Expiration: 12/31/2023  Progress Note Due: 12/23/2023  Visit # / Visits authorized: 1/1   FOTO: 0/3 (last performed on 11/15/2023)    Precautions: Standard and Fall    Time In: 2:00 PM  Time Out: 2: 45 PM  Total Billable Time (timed & untimed codes): 45 minutes    Subjective       History of current condition - Zaheer reports his left knee has multiple surgery history due to traumatic injury. His right knee has one surgery more than 10 years ago. He starts to feel knee pain about 3 months ago with unknown mechanism of injury. He denied numbness or tingling. Long distance walking and sitting causes knee pain. He reports pain is not constant.     Falls: no     Imaging: [x] Xray [] MRI [] CT: Performed on:   EXAMINATION:  XR KNEE ORTHO BILAT WITH FLEXION     CLINICAL HISTORY:  Pain in right knee     FINDINGS:  Four views bilateral knees.     Right: No fracture dislocation bone destruction or OCD seen.  There is DJD.     Left: No fracture dislocation bone destruction seen.  There is DJD and a mild varus deformity.       Pain:  Current 3/10, worst 6/10, best 0/10   Location: [x] Right   [x] Left:    Description: aching , deep, and throbbing  Aggravating Factors: long distance walking  Easing Factors:  activity avoidance, rest,     Prior Therapy:   [] N/A    [] Yes:   Social History: Pt lives with their family  Prior Level of Function: Independent and pain free with all ADL, IADL, community mobility and functional activities.   Current Level of Function: Independent with all ADL, IADL, community mobility and functional activities with reports of increased pain and need for increased time and frequent breaks.      Dominant Extremity:    [x] Right    [] Left    Pts goals: Pt reported goals are to decrease overall pain levels in order to return to prior functional level.     Medical History:   Past Medical History:   Diagnosis Date    Chronic urticaria 11/12/2014    Colonic polyp     Malignant neoplasm of thyroid gland     thyroid    Obesity     Post-surgical hypothyroidism     PUD (peptic ulcer disease)     Urticaria        Surgical History:   Zaheer Neal  has a past surgical history that includes Knee surgery (8/2013); Thyroidectomy; and Colonoscopy (N/A, 2/5/2018).    Medications:   Zaheer has a current medication list which includes the following prescription(s): cephalexin, cetirizine, diclofenac sodium, epinephrine, famotidine, fluticasone propionate, guaifenesin-codeine 100-10 mg/5 ml, hydroxyzine hcl, lactobacillus rhamnosus gg, poly hist forte, prednisone, rosuvastatin, sildenafil, tamsulosin, and tirosint.    Allergies:   Review of patient's allergies indicates:   Allergen Reactions    Shellfish containing products Hives        Objective      No point tenderness at knee bilaterally     Passive knee extension and flexion causes pain bilaterally     Right: 3-115 deg   Left: 2 -113 deg    Manual muscle test   Right hip flexor: 4/5, quadriceps: 4+/5, ankle DF 5/5, PF 5/5, hip abducutor: 4/5   Left hip flexor: 4-/5, quadriceps: 4+/5, ankle DF 5/5, PF 5/5, hip abductor: 4/5    Lachman test: negative   Valgus test: negative   Varus test: negative     Left patella lateral shift- restricted more than  right   Fibular head mobility - restricted bilaterally       SENSATION  [x] Intact to Light Touch   [] Impaired:      POSTURE:  Pt presents with postural abnormalities which include:    [] Forward Head   [] Increased Lumbar Lordosis   [] Rounded Shoulder   [] Genu Recurvatum   [] Increased Thoracic Kyphosis [] Genu Valgus   [] Trunk Deviated    [] Pes Planus   [] Scapular Winging    [x] Other: flat back     Sit to stand: one hand support   Gait: antalgic gait   Single leg balance: no hand support - significant body deviation w/o hand support       Function:     Intake Outcome Measure for FOTO  Survey    Therapist reviewed FOTO scores for Zaheer on 11/15/2023.   FOTO documents entered into [employer] - see Media section.    Intake Score: %         Treatment     Total Treatment time (time-based codes) separate from Evaluation: (15) minutes     Zaheer received the treatments listed below:      Nustep : 10 min L3.0  Hep     Patient Education and Home Exercises     Education provided: (10) minutes  PURPOSE: Patient educated on the impairments noted above and the effects of physical therapy intervention to improve overall condition and QOL.   EXERCISE: Patient was educated on all the above exercise prior/during/after for proper posture, positioning, and execution for safe performance with home exercise program.   STRENGTH: Patient educated on the importance of improved core and extremity strength in order to improve alignment of the spine and extremities with static positions and dynamic movement.   GAIT & BALANCE: Patient educated on the importance of strong core and lower extremity musculature in order to improve both static and dynamic balance, improve gait mechanics, reduce fall risk and improve household and community mobility.   TRANSFERS & TRANSITIONS: Patient educated on proper technique for bed mobility, transitions and transfers to improve body mechanics and decrease risk of injury.   ERGONOMICS: Patient educated on  proper ergonomics at the work station in order to maintain optimal alignment of the musculoskeletal system and improve efficiency in the work environment.    Written Home Exercises Provided: yes.  Exercises were reviewed and Zaheer was able to demonstrate them prior to the end of the session.  Zaheer demonstrated good  understanding of the education provided. See EMR under Patient Instructions for exercises provided during therapy sessions.    Assessment     Zaheer is a 63 y.o. male referred to outpatient Physical Therapy with a medical diagnosis of   Diagnosis   M25.561,M25.562 (ICD-10-CM) - Pain in both knees, unspecified chronicity   M17.0 (ICD-10-CM) - Primary osteoarthritis of both knees   . Pt presents with impairments in the following categories: IMPAIRMENTS: ROM, strength, endurance, joint mobility, muscle length, balance, and gait mechanics    Pt prognosis is Good  Pt will benefit from skilled outpatient Physical Therapy to address the deficits stated above and in the chart below, provide pt/family education, and to maximize pt's level of independence.     Plan of care discussed with patient: Yes  Pt's spiritual, cultural and educational needs considered and patient is agreeable to the plan of care and goals as stated below:     Anticipated Barriers for therapy: none    Medical Necessity is demonstrated by the following  History  Co-morbidities and personal factors that may impact the plan of care [] LOW: no personal factors / co-morbidities  [x] MODERATE: 1-2 personal factors / co-morbidities  [] HIGH: 3+ personal factors / co-morbidities    Moderate / High Support Documentation:   Past Medical History:   Diagnosis Date    Chronic urticaria 11/12/2014    Colonic polyp     Malignant neoplasm of thyroid gland     thyroid    Obesity     Post-surgical hypothyroidism     PUD (peptic ulcer disease)     Urticaria         Examination  Body Structures and Functions, activity limitations and participation  "restrictions that may impact the plan of care [] LOW: addressing 1-2 elements  [x] MODERATE: 3+ elements  [] HIGH: 4+ elements (please support below)    Moderate / High Support Documentation: See above in "Current Level of Function"      Clinical Presentation [] LOW: stable  [x] MODERATE: Evolving  [] HIGH: Unstable     Decision Making/ Complexity Score: moderate         Short Term Goals:  2 weeks Status  Date Met   PAIN: Pt will report worst pain of 2/10 in order to progress toward max functional ability and improve quality of life. [x] Progressing  [] Met  [] Not Met    MOBILITY: Patient will improve AROM to 50% of stated goals, listed in objective measures above, in order to progress towards independence with functional activities.  [x] Progressing  [] Met  [] Not Met    STRENGTH: Patient will improve strength to 50% of stated goals, listed in objective measures above, in order to progress towards independence with functional activities. [x] Progressing  [] Met  [] Not Met    GAIT: Patient will demonstrate improved gait mechanics including improved foot contact in order to improve functional mobility, improve quality of life, and decrease risk of further injury or fall.  [x] Progressing  [] Met  [] Not Met    HEP: Patient will demonstrate independence with HEP in order to progress toward functional independence. [x] Progressing  [] Met  [] Not Met      Long Term Goals:  8 weeks Status Date Met   PAIN: Pt will report worst pain of 0/10 in order to progress toward max functional ability and improve quality of life [x] Progressing  [] Met  [] Not Met    MOBILITY: Patient will improve AROM to 0-120 deg bilaterally in order to return to maximal functional potential and improve quality of life.  [x] Progressing  [] Met  [] Not Met    STRENGTH: Patient will improve lower extremity strength to 5/5 in order to improve functional independence and quality of life.  [x] Progressing  [] Met  [] Not Met    GAIT: Patient will " demonstrate normalized gait mechanics with minimal compensation in order to return to PLOF. [x] Progressing  [] Met  [] Not Met    Patient will return to normal ADL's, IADL's, community involvement, recreational activities, and work-related activities with less than or equal to 0/10 pain and maximal function.  [x] Progressing  [] Met  [] Not Met      Plan     Plan of care Certification: 11/15/2023 to 12/31/2023.    Outpatient Physical Therapy 2 times weekly for 8 weeks to include any combination of the following interventions: virtual visits, dry needling, modalities, electrical stimulation (IFC, Pre-Mod, Attended with Functional Dry Needling), Cervical/Lumbar Traction, Electrical Stimulation IFC, Gait Training, Manual Therapy, Moist Heat/ Ice, Neuromuscular Re-ed, Patient Education, Self Care, Therapeutic Activities, Therapeutic Exercise, and Therapeutic Activites     Yandy Willis, PT, DPT

## 2023-11-21 ENCOUNTER — CLINICAL SUPPORT (OUTPATIENT)
Dept: REHABILITATION | Facility: HOSPITAL | Age: 63
End: 2023-11-21
Payer: COMMERCIAL

## 2023-11-21 DIAGNOSIS — M25.662 KNEE JOINT STIFFNESS, BILATERAL: Primary | ICD-10-CM

## 2023-11-21 DIAGNOSIS — M17.0 OSTEOARTHRITIS OF BOTH KNEES, UNSPECIFIED OSTEOARTHRITIS TYPE: ICD-10-CM

## 2023-11-21 DIAGNOSIS — M25.661 KNEE JOINT STIFFNESS, BILATERAL: Primary | ICD-10-CM

## 2023-11-21 PROCEDURE — 97110 THERAPEUTIC EXERCISES: CPT | Mod: PO

## 2023-11-21 PROCEDURE — 97530 THERAPEUTIC ACTIVITIES: CPT | Mod: PO

## 2023-11-21 PROCEDURE — 97112 NEUROMUSCULAR REEDUCATION: CPT | Mod: PO

## 2023-11-21 PROCEDURE — 97140 MANUAL THERAPY 1/> REGIONS: CPT | Mod: PO

## 2023-11-21 NOTE — PROGRESS NOTES
[facility]   Physical Therapy Treatment Note      Name: Zaheer Neal  [facility] Number: [id number]    Therapy Diagnosis: No diagnosis found.  Physician: Zaheer Metcalf MD    Visit Date: 2023    Physician Orders: PT Eval and Treat  Medical Diagnosis from Referral:   Diagnosis   M25.561,M25.562 (ICD-10-CM) - Pain in both knees, unspecified chronicity   M17.0 (ICD-10-CM) - Primary osteoarthritis of both knees      Evaluation Date: 11/15/2023  Authorization Period Expiration: 2023  Plan of Care Expiration: 2023  Progress Note Due: 2023  Visit # / Visits authorized:    FOTO: 0/3 (last performed on 11/15/2023)     Precautions: Standard and Fall     Time In: 1:00 PM  Time Out: 2: 00 PM  Total Billable Time (timed & untimed codes): 60 minutes    PTA Visit #: 0/5       Subjective     Patient reports: he feels good since last visit, nothing significant changes.    He was compliant with home exercise program.  Response to previous treatment: good  Functional change: n/a    Pain: /10  Location: bilateral knee     Objective      Objective Measures updated at progress report unless specified.     Treatment     Zaheer received the treatments listed below:      therapeutic exercises to develop strength, endurance, ROM, flexibility, and posture for 25 minutes including:  Nustep 10 min L4.0   Supine marching: 10 x3   Supine bridges: GTB 10 x3       manual therapy techniques: Joint mobilizations, Manual traction, Myofacial release, Manual Lymphatic Drainage, Soft tissue Mobilization, and Friction Massage were applied to the: Bilateral knee, fibular head for 10 minutes, including: fibular head mobilization     neuromuscular re-education activities to improve: Balance, Coordination, Kinesthetic, Sense, and Proprioception for 8 minutes. The following activities were included:  Hamstring stretchin sec x3  Calf stretching: 30 x3   Clam shell: 10 x3 GTB      therapeutic activities to improve functional performance for 12  minutes, including:  Shuttle: DL 10 X3 4stirng, SL 10 X3 2string   LAQ: 10 x3 7#  SAQ: 10 x3 4#      Patient Education and Home Exercises       Education provided:   - self care    Written Home Exercises Provided: yes. Exercises were reviewed and Zaheer was able to demonstrate them prior to the end of the session.  Zaheer demonstrated good  understanding of the education provided. See Electronic Medical Record under Patient Instructions for exercises provided during therapy sessions    Assessment     Pt reports medial knee pain during manual therapy. He present with reduced hip strength and lower extremity endurance. Pt tolerated all exercises well, reports fatigue at the end of treatment. Will monitor and progress him as tolerate it.     Zaheer Is progressing well towards his goals.   Patient prognosis is Good.     Patient will continue to benefit from skilled outpatient physical therapy to address the deficits listed in the problem list box on initial evaluation, provide pt/family education and to maximize pt's level of independence in the home and community environment.     Patient's spiritual, cultural and educational needs considered and pt agreeable to plan of care and goals.     Anticipated barriers to physical therapy: none    Short Term Goals:  2 weeks Status  Date Met   PAIN: Pt will report worst pain of 2/10 in order to progress toward max functional ability and improve quality of life. [x] Progressing  [] Met  [] Not Met     MOBILITY: Patient will improve AROM to 50% of stated goals, listed in objective measures above, in order to progress towards independence with functional activities.  [x] Progressing  [] Met  [] Not Met     STRENGTH: Patient will improve strength to 50% of stated goals, listed in objective measures above, in order to progress towards independence with functional activities. [x] Progressing  [] Met  [] Not Met      GAIT: Patient will demonstrate improved gait mechanics including improved foot contact in order to improve functional mobility, improve quality of life, and decrease risk of further injury or fall.  [x] Progressing  [] Met  [] Not Met     HEP: Patient will demonstrate independence with HEP in order to progress toward functional independence. [x] Progressing  [] Met  [] Not Met        Long Term Goals:  8 weeks Status Date Met   PAIN: Pt will report worst pain of 0/10 in order to progress toward max functional ability and improve quality of life [x] Progressing  [] Met  [] Not Met     MOBILITY: Patient will improve AROM to 0-120 deg bilaterally in order to return to maximal functional potential and improve quality of life.  [x] Progressing  [] Met  [] Not Met     STRENGTH: Patient will improve lower extremity strength to 5/5 in order to improve functional independence and quality of life.  [x] Progressing  [] Met  [] Not Met     GAIT: Patient will demonstrate normalized gait mechanics with minimal compensation in order to return to PLOF. [x] Progressing  [] Met  [] Not Met     Patient will return to normal ADL's, IADL's, community involvement, recreational activities, and work-related activities with less than or equal to 0/10 pain and maximal function.  [x] Progressing  [] Met  [] Not Met        Plan      Plan of care Certification: 11/15/2023 to 12/31/2023.     Outpatient Physical Therapy 2 times weekly for 8 weeks to include any combination of the following interventions: virtual visits, dry needling, modalities, electrical stimulation (IFC, Pre-Mod, Attended with Functional Dry Needling), Cervical/Lumbar Traction, Electrical Stimulation IFC, Gait Training, Manual Therapy, Moist Heat/ Ice, Neuromuscular Re-ed, Patient Education, Self Care, Therapeutic Activities, Therapeutic Exercise, and Therapeutic Activites      Yandy Willis, PT, DPT

## 2023-11-24 ENCOUNTER — CLINICAL SUPPORT (OUTPATIENT)
Dept: REHABILITATION | Facility: HOSPITAL | Age: 63
End: 2023-11-24
Payer: COMMERCIAL

## 2023-11-24 DIAGNOSIS — M25.662 KNEE JOINT STIFFNESS, BILATERAL: Primary | ICD-10-CM

## 2023-11-24 DIAGNOSIS — M25.661 KNEE JOINT STIFFNESS, BILATERAL: Primary | ICD-10-CM

## 2023-11-24 PROCEDURE — 97530 THERAPEUTIC ACTIVITIES: CPT | Mod: PO,CQ

## 2023-11-24 PROCEDURE — 97110 THERAPEUTIC EXERCISES: CPT | Mod: PO,CQ

## 2023-11-24 PROCEDURE — 97112 NEUROMUSCULAR REEDUCATION: CPT | Mod: PO,CQ

## 2023-11-24 NOTE — PROGRESS NOTES
[facility]   Physical Therapy Treatment Note      Name: Zaheer Neal  [facility] Number: [id number]    Therapy Diagnosis:   Encounter Diagnosis   Name Primary?    Knee joint stiffness, bilateral Yes     Physician: Zaheer Metcalf MD    Visit Date: 2023    Physician Orders: PT Eval and Treat  Medical Diagnosis from Referral:   Diagnosis   M25.561,M25.562 (ICD-10-CM) - Pain in both knees, unspecified chronicity   M17.0 (ICD-10-CM) - Primary osteoarthritis of both knees      Evaluation Date: 11/15/2023  Authorization Period Expiration: 2023  Plan of Care Expiration: 2023  Progress Note Due: 2023  Visit # / Visits authorized: ,   FOTO: 0/3 (last performed on 11/15/2023)     Precautions: Standard and Fall     Time In: 11:00 AM  Time Out: 11:55 AM  Total Billable Time (timed & untimed codes): 55 minutes    PTA Visit #:        Subjective     Patient reports: no new issues or concerns at this time.   He was compliant with home exercise program.  Response to previous treatment: good  Functional change: n/a    Pain: /10  Location: bilateral knee     Objective      Objective Measures updated at progress report unless specified.     Treatment     Zaheer received the treatments listed below:      therapeutic exercises to develop strength, endurance, ROM, flexibility, and posture for 25 minutes including:    Nustep 10 min L4.0   Supine marching: 10 x 3   Supine bridges: GTB 10 x 3       manual therapy techniques: Joint mobilizations, Manual traction, Myofacial release, Manual Lymphatic Drainage, Soft tissue Mobilization, and Friction Massage were applied to the: Bilateral knee, fibular head for 0 minutes, including: fibular head mobilization     neuromuscular re-education activities to improve: Balance, Coordination, Kinesthetic, Sense, and Proprioception for 10 minutes. The following activities were included:    Hamstring stretchin sec x3  Calf  stretching: 30 x 3   Clam shell: 10 x 3 GTB     therapeutic activities to improve functional performance for 20  minutes, including:    Shuttle: DL 10 X 3 4 string, SL 10 X 3 2 string   LAQ: 10 x3 7#  SAQ: 10 x3 4#      Patient Education and Home Exercises       Education provided:   - self care    Written Home Exercises Provided: yes. Exercises were reviewed and Zaheer was able to demonstrate them prior to the end of the session.  Zaheer demonstrated good  understanding of the education provided. See Electronic Medical Record under Patient Instructions for exercises provided during therapy sessions    Assessment     Pt with no pain post session.  Pt required minor cueing on proper knee alignment during shuttle pressed to prevent knee valgus.  Will continue to monitor and progress pt as tolerated.      Zaheer Is progressing well towards his goals.   Patient prognosis is Good.     Patient will continue to benefit from skilled outpatient physical therapy to address the deficits listed in the problem list box on initial evaluation, provide pt/family education and to maximize pt's level of independence in the home and community environment.     Patient's spiritual, cultural and educational needs considered and pt agreeable to plan of care and goals.     Anticipated barriers to physical therapy: none    Short Term Goals:  2 weeks Status  Date Met   PAIN: Pt will report worst pain of 2/10 in order to progress toward max functional ability and improve quality of life. [x] Progressing  [] Met  [] Not Met     MOBILITY: Patient will improve AROM to 50% of stated goals, listed in objective measures above, in order to progress towards independence with functional activities.  [x] Progressing  [] Met  [] Not Met     STRENGTH: Patient will improve strength to 50% of stated goals, listed in objective measures above, in order to progress towards independence with functional activities. [x] Progressing  [] Met  [] Not Met      GAIT: Patient will demonstrate improved gait mechanics including improved foot contact in order to improve functional mobility, improve quality of life, and decrease risk of further injury or fall.  [x] Progressing  [] Met  [] Not Met     HEP: Patient will demonstrate independence with HEP in order to progress toward functional independence. [x] Progressing  [] Met  [] Not Met        Long Term Goals:  8 weeks Status Date Met   PAIN: Pt will report worst pain of 0/10 in order to progress toward max functional ability and improve quality of life [x] Progressing  [] Met  [] Not Met     MOBILITY: Patient will improve AROM to 0-120 deg bilaterally in order to return to maximal functional potential and improve quality of life.  [x] Progressing  [] Met  [] Not Met     STRENGTH: Patient will improve lower extremity strength to 5/5 in order to improve functional independence and quality of life.  [x] Progressing  [] Met  [] Not Met     GAIT: Patient will demonstrate normalized gait mechanics with minimal compensation in order to return to PLOF. [x] Progressing  [] Met  [] Not Met     Patient will return to normal ADL's, IADL's, community involvement, recreational activities, and work-related activities with less than or equal to 0/10 pain and maximal function.  [x] Progressing  [] Met  [] Not Met        Plan      Plan of care Certification: 11/15/2023 to 12/31/2023.     Outpatient Physical Therapy 2 times weekly for 8 weeks to include any combination of the following interventions: virtual visits, dry needling, modalities, electrical stimulation (IFC, Pre-Mod, Attended with Functional Dry Needling), Cervical/Lumbar Traction, Electrical Stimulation IFC, Gait Training, Manual Therapy, Moist Heat/ Ice, Neuromuscular Re-ed, Patient Education, Self Care, Therapeutic Activities, Therapeutic Exercise, and Therapeutic Activites      Silvino Chávez, ADI

## 2023-11-27 ENCOUNTER — CLINICAL SUPPORT (OUTPATIENT)
Dept: REHABILITATION | Facility: HOSPITAL | Age: 63
End: 2023-11-27
Payer: COMMERCIAL

## 2023-11-27 DIAGNOSIS — M25.661 KNEE JOINT STIFFNESS, BILATERAL: ICD-10-CM

## 2023-11-27 DIAGNOSIS — M25.662 KNEE JOINT STIFFNESS, BILATERAL: ICD-10-CM

## 2023-11-27 DIAGNOSIS — M25.562 CHRONIC PAIN OF BOTH KNEES: Primary | ICD-10-CM

## 2023-11-27 DIAGNOSIS — M25.561 CHRONIC PAIN OF BOTH KNEES: Primary | ICD-10-CM

## 2023-11-27 DIAGNOSIS — G89.29 CHRONIC PAIN OF BOTH KNEES: Primary | ICD-10-CM

## 2023-11-27 PROCEDURE — 97140 MANUAL THERAPY 1/> REGIONS: CPT | Mod: PO

## 2023-11-27 PROCEDURE — 97110 THERAPEUTIC EXERCISES: CPT | Mod: PO

## 2023-11-27 PROCEDURE — 97112 NEUROMUSCULAR REEDUCATION: CPT | Mod: PO

## 2023-11-27 PROCEDURE — 97530 THERAPEUTIC ACTIVITIES: CPT | Mod: PO

## 2023-11-27 NOTE — PROGRESS NOTES
[facility]   Physical Therapy Treatment Note      Name: Zaheer Neal  [facility] Number: [id number]    Therapy Diagnosis:   Encounter Diagnoses   Name Primary?    Chronic pain of both knees Yes    Knee joint stiffness, bilateral        Physician: Zaheer Metcalf MD    Visit Date: 11/27/2023    Physician Orders: PT Eval and Treat  Medical Diagnosis from Referral:   Diagnosis   M25.561,M25.562 (ICD-10-CM) - Pain in both knees, unspecified chronicity   M17.0 (ICD-10-CM) - Primary osteoarthritis of both knees      Evaluation Date: 11/15/2023  Authorization Period Expiration: 12/31/2023  Plan of Care Expiration: 12/31/2023  Progress Note Due: 12/23/2023  Visit # / Visits authorized: 1/1, 1/20  FOTO: 0/3 (last performed on 11/15/2023)     Precautions: Standard and Fall     Time In: 11:00 AM  Time Out: 11:55 AM  Total Billable Time (timed & untimed codes): 55 minutes    PTA Visit #: 1/5       Subjective     Patient reports: no new issues or concerns at this time.   He was compliant with home exercise program.  Response to previous treatment: good  Functional change: n/a    Pain: /10  Location: bilateral knee     Objective      Objective Measures updated at progress report unless specified.     Treatment     Zaheer received the treatments listed below:      therapeutic exercises to develop strength, endurance, ROM, flexibility, and posture for 25 minutes including:    Nustep 10 min L4.0   Supine marching: 10 x 3   Supine bridges: GTB 10 x 3     manual therapy techniques: Joint mobilizations, Manual traction, Myofacial release, Manual Lymphatic Drainage, Soft tissue Mobilization, and Friction Massage were applied to the: Bilateral knee, fibular head for 10 minutes, including: fibular head mobilization     neuromuscular re-education activities to improve: Balance, Coordination, Kinesthetic, Sense, and Proprioception for 10 minutes. The following activities were included:    Hamstring  stretchin sec x3  Calf stretching: 30 x 3   Clam shell: 10 x 3 GTB     therapeutic activities to improve functional performance for 15  minutes, including:    Shuttle: DL 10 X 3 4 string, SL 10 X 3 2 string   LAQ: BTB 10 x3 lewis   Single leg heel tap: 8 x3 lewis 6'' box       Patient Education and Home Exercises       Education provided:   - self care    Written Home Exercises Provided: yes. Exercises were reviewed and Zaheer was able to demonstrate them prior to the end of the session.  Zaheer demonstrated good  understanding of the education provided. See Electronic Medical Record under Patient Instructions for exercises provided during therapy sessions    Assessment     Pt presents with reduced bilateral fibular head mobility. He responds well to manual therapy. Pt reports medial side knee discomfort during heel tap. He presents with reduced knee and hip control. Will progress him as tolerate it.     Zaheer Is progressing well towards his goals.   Patient prognosis is Good.     Patient will continue to benefit from skilled outpatient physical therapy to address the deficits listed in the problem list box on initial evaluation, provide pt/family education and to maximize pt's level of independence in the home and community environment.     Patient's spiritual, cultural and educational needs considered and pt agreeable to plan of care and goals.     Anticipated barriers to physical therapy: none    Short Term Goals:  2 weeks Status  Date Met   PAIN: Pt will report worst pain of 2/10 in order to progress toward max functional ability and improve quality of life. [x] Progressing  [] Met  [] Not Met     MOBILITY: Patient will improve AROM to 50% of stated goals, listed in objective measures above, in order to progress towards independence with functional activities.  [x] Progressing  [] Met  [] Not Met     STRENGTH: Patient will improve strength to 50% of stated goals, listed in objective measures above, in order  to progress towards independence with functional activities. [x] Progressing  [] Met  [] Not Met     GAIT: Patient will demonstrate improved gait mechanics including improved foot contact in order to improve functional mobility, improve quality of life, and decrease risk of further injury or fall.  [x] Progressing  [] Met  [] Not Met     HEP: Patient will demonstrate independence with HEP in order to progress toward functional independence. [x] Progressing  [] Met  [] Not Met        Long Term Goals:  8 weeks Status Date Met   PAIN: Pt will report worst pain of 0/10 in order to progress toward max functional ability and improve quality of life [x] Progressing  [] Met  [] Not Met     MOBILITY: Patient will improve AROM to 0-120 deg bilaterally in order to return to maximal functional potential and improve quality of life.  [x] Progressing  [] Met  [] Not Met     STRENGTH: Patient will improve lower extremity strength to 5/5 in order to improve functional independence and quality of life.  [x] Progressing  [] Met  [] Not Met     GAIT: Patient will demonstrate normalized gait mechanics with minimal compensation in order to return to PLOF. [x] Progressing  [] Met  [] Not Met     Patient will return to normal ADL's, IADL's, community involvement, recreational activities, and work-related activities with less than or equal to 0/10 pain and maximal function.  [x] Progressing  [] Met  [] Not Met        Plan      Plan of care Certification: 11/15/2023 to 12/31/2023.     Outpatient Physical Therapy 2 times weekly for 8 weeks to include any combination of the following interventions: virtual visits, dry needling, modalities, electrical stimulation (IFC, Pre-Mod, Attended with Functional Dry Needling), Cervical/Lumbar Traction, Electrical Stimulation IFC, Gait Training, Manual Therapy, Moist Heat/ Ice, Neuromuscular Re-ed, Patient Education, Self Care, Therapeutic Activities, Therapeutic Exercise, and Therapeutic Activites       Silvino Chávez, PTA

## 2023-12-01 ENCOUNTER — CLINICAL SUPPORT (OUTPATIENT)
Dept: REHABILITATION | Facility: HOSPITAL | Age: 63
End: 2023-12-01
Payer: COMMERCIAL

## 2023-12-01 DIAGNOSIS — G89.29 CHRONIC PAIN OF BOTH KNEES: Primary | ICD-10-CM

## 2023-12-01 DIAGNOSIS — M25.561 CHRONIC PAIN OF BOTH KNEES: Primary | ICD-10-CM

## 2023-12-01 DIAGNOSIS — M25.562 CHRONIC PAIN OF BOTH KNEES: Primary | ICD-10-CM

## 2023-12-01 PROCEDURE — 97112 NEUROMUSCULAR REEDUCATION: CPT | Mod: PO,CQ

## 2023-12-01 PROCEDURE — 97530 THERAPEUTIC ACTIVITIES: CPT | Mod: PO,CQ

## 2023-12-01 PROCEDURE — 97110 THERAPEUTIC EXERCISES: CPT | Mod: PO,CQ

## 2023-12-01 PROCEDURE — 97140 MANUAL THERAPY 1/> REGIONS: CPT | Mod: PO,CQ

## 2023-12-01 NOTE — PROGRESS NOTES
[facility]   Physical Therapy Treatment Note      Name: Zaheer Neal  [facility] Number: [id number]    Therapy Diagnosis:   Encounter Diagnosis   Name Primary?    Chronic pain of both knees Yes         Physician: Zaheer Metcalf MD    Visit Date: 2023    Physician Orders: PT Eval and Treat  Medical Diagnosis from Referral:   Diagnosis   M25.561,M25.562 (ICD-10-CM) - Pain in both knees, unspecified chronicity   M17.0 (ICD-10-CM) - Primary osteoarthritis of both knees      Evaluation Date: 11/15/2023  Authorization Period Expiration: 2023  Plan of Care Expiration: 2023  Progress Note Due: 2023  Visit # / Visits authorized: ,   FOTO: 0/3 (last performed on 11/15/2023)     Precautions: Standard and Fall     Time In: 11:00 AM  Time Out: 11:55 AM  Total Billable Time (timed & untimed codes): 55 minutes    PTA Visit #:        Subjective     Patient reports: no new issues or concerns at this time.   He was compliant with home exercise program.  Response to previous treatment: good  Functional change: n/a    Pain: /10  Location: bilateral knee     Objective      Objective Measures updated at progress report unless specified.     Treatment     Zaheer received the treatments listed below:      therapeutic exercises to develop strength, endurance, ROM, flexibility, and posture for 25 minutes including:    Nustep 10 min L 4.0   Supine marching: 10 x 3   Supine bridges: GTB 10 x 3     manual therapy techniques: Joint mobilizations, Manual traction, Myofacial release, Manual Lymphatic Drainage, Soft tissue Mobilization, and Friction Massage were applied to the: Bilateral knee, fibular head for 10 minutes, including: fibular head mobilization     neuromuscular re-education activities to improve: Balance, Coordination, Kinesthetic, Sense, and Proprioception for 10 minutes. The following activities were included:    Hamstring stretchin sec x 4   Calf  stretching: 30 x 4   Clam shell: 10 x 3 GTB     therapeutic activities to improve functional performance for 10  minutes, including:    Shuttle: DL 10 X 3 4 string, SL 10 X 3 2 string   LAQ: BTB 10 x 3 lewis   Single leg heel tap: 8 x 3 lewis 6'' box       Patient Education and Home Exercises       Education provided:   - self care    Written Home Exercises Provided: yes. Exercises were reviewed and Zaheer was able to demonstrate them prior to the end of the session.  Zaheer demonstrated good  understanding of the education provided. See Electronic Medical Record under Patient Instructions for exercises provided during therapy sessions    Assessment     Pt with no reports of increased discomfort post session.  Pt continues to tolerate strengthening exercises well at this time.  Will continue to monitor and progress pt as tolerated.     Zaheer Is progressing well towards his goals.   Patient prognosis is Good.     Patient will continue to benefit from skilled outpatient physical therapy to address the deficits listed in the problem list box on initial evaluation, provide pt/family education and to maximize pt's level of independence in the home and community environment.     Patient's spiritual, cultural and educational needs considered and pt agreeable to plan of care and goals.     Anticipated barriers to physical therapy: none    Short Term Goals:  2 weeks Status  Date Met   PAIN: Pt will report worst pain of 2/10 in order to progress toward max functional ability and improve quality of life. [x] Progressing  [] Met  [] Not Met     MOBILITY: Patient will improve AROM to 50% of stated goals, listed in objective measures above, in order to progress towards independence with functional activities.  [x] Progressing  [] Met  [] Not Met     STRENGTH: Patient will improve strength to 50% of stated goals, listed in objective measures above, in order to progress towards independence with functional activities. [x]  Progressing  [] Met  [] Not Met     GAIT: Patient will demonstrate improved gait mechanics including improved foot contact in order to improve functional mobility, improve quality of life, and decrease risk of further injury or fall.  [x] Progressing  [] Met  [] Not Met     HEP: Patient will demonstrate independence with HEP in order to progress toward functional independence. [x] Progressing  [] Met  [] Not Met        Long Term Goals:  8 weeks Status Date Met   PAIN: Pt will report worst pain of 0/10 in order to progress toward max functional ability and improve quality of life [x] Progressing  [] Met  [] Not Met     MOBILITY: Patient will improve AROM to 0-120 deg bilaterally in order to return to maximal functional potential and improve quality of life.  [x] Progressing  [] Met  [] Not Met     STRENGTH: Patient will improve lower extremity strength to 5/5 in order to improve functional independence and quality of life.  [x] Progressing  [] Met  [] Not Met     GAIT: Patient will demonstrate normalized gait mechanics with minimal compensation in order to return to PLOF. [x] Progressing  [] Met  [] Not Met     Patient will return to normal ADL's, IADL's, community involvement, recreational activities, and work-related activities with less than or equal to 0/10 pain and maximal function.  [x] Progressing  [] Met  [] Not Met        Plan      Plan of care Certification: 11/15/2023 to 12/31/2023.     Outpatient Physical Therapy 2 times weekly for 8 weeks to include any combination of the following interventions: virtual visits, dry needling, modalities, electrical stimulation (IFC, Pre-Mod, Attended with Functional Dry Needling), Cervical/Lumbar Traction, Electrical Stimulation IFC, Gait Training, Manual Therapy, Moist Heat/ Ice, Neuromuscular Re-ed, Patient Education, Self Care, Therapeutic Activities, Therapeutic Exercise, and Therapeutic Activites      Silvino Chávez, PTA

## 2023-12-04 ENCOUNTER — CLINICAL SUPPORT (OUTPATIENT)
Dept: REHABILITATION | Facility: HOSPITAL | Age: 63
End: 2023-12-04
Payer: COMMERCIAL

## 2023-12-04 DIAGNOSIS — M25.562 CHRONIC PAIN OF BOTH KNEES: Primary | ICD-10-CM

## 2023-12-04 DIAGNOSIS — G89.29 CHRONIC PAIN OF BOTH KNEES: Primary | ICD-10-CM

## 2023-12-04 DIAGNOSIS — M25.561 CHRONIC PAIN OF BOTH KNEES: Primary | ICD-10-CM

## 2023-12-04 PROCEDURE — 97112 NEUROMUSCULAR REEDUCATION: CPT | Mod: PO,CQ

## 2023-12-04 PROCEDURE — 97110 THERAPEUTIC EXERCISES: CPT | Mod: PO,CQ

## 2023-12-04 PROCEDURE — 97530 THERAPEUTIC ACTIVITIES: CPT | Mod: PO,CQ

## 2023-12-04 NOTE — PROGRESS NOTES
[facility]   Physical Therapy Treatment Note      Name: Zaheer Neal  [facility] Number: [id number]    Therapy Diagnosis:   Encounter Diagnosis   Name Primary?    Chronic pain of both knees Yes         Physician: Zaheer Metcalf MD    Visit Date: 12/4/2023    Physician Orders: PT Eval and Treat  Medical Diagnosis from Referral:   Diagnosis   M25.561,M25.562 (ICD-10-CM) - Pain in both knees, unspecified chronicity   M17.0 (ICD-10-CM) - Primary osteoarthritis of both knees      Evaluation Date: 11/15/2023  Authorization Period Expiration: 12/31/2023  Plan of Care Expiration: 12/31/2023  Progress Note Due: 12/23/2023  Visit # / Visits authorized: 1/1, 4/20  FOTO: 0/3 (last performed on 11/15/2023)     Precautions: Standard and Fall     Time In: 1:00 PM  Time Out: 1:55 PM  Total Billable Time (timed & untimed codes): 55 minutes    PTA Visit #: 2/5       Subjective     Patient reports: his knee was a little sore after performing step downs the other day.   He was compliant with home exercise program.  Response to previous treatment: good  Functional change: n/a    Pain: /10  Location: bilateral knee     Objective      Objective Measures updated at progress report unless specified.     Treatment     Zaheer received the treatments listed below:      therapeutic exercises to develop strength, endurance, ROM, flexibility, and posture for 25 minutes including:    Nustep 10 min L 4.0   Supine marching: 10 x 3 BTB  Supine bridges: BTB 10 x 3     manual therapy techniques: Joint mobilizations, Manual traction, Myofacial release, Manual Lymphatic Drainage, Soft tissue Mobilization, and Friction Massage were applied to the: Bilateral knee, fibular head for 0 minutes, including: fibular head mobilization     neuromuscular re-education activities to improve: Balance, Coordination, Kinesthetic, Sense, and Proprioception for 10 minutes. The following activities were included:    Hamstring  stretchin sec x 4   Calf stretching: 30 x 4   Clam shell: 10 x 3 GTB     therapeutic activities to improve functional performance for 20  minutes, including:    Shuttle: DL 10 X 3 4 string, SL 10 X 3 2 string   LAQ: BTB 10 x 3 lewis   Single leg heel tap: 8 x 3 lewis 6'' box       Patient Education and Home Exercises       Education provided:   - self care    Written Home Exercises Provided: yes. Exercises were reviewed and Zaheer was able to demonstrate them prior to the end of the session.  Zaheer demonstrated good  understanding of the education provided. See Electronic Medical Record under Patient Instructions for exercises provided during therapy sessions    Assessment     Pt with no reports of pain post session.  Pt was able to tolerate increased resistance with side lying clamshells and supine bridge's.  Will continue to monitor and progress pt as tolerated.      Zaheer Is progressing well towards his goals.   Patient prognosis is Good.     Patient will continue to benefit from skilled outpatient physical therapy to address the deficits listed in the problem list box on initial evaluation, provide pt/family education and to maximize pt's level of independence in the home and community environment.     Patient's spiritual, cultural and educational needs considered and pt agreeable to plan of care and goals.     Anticipated barriers to physical therapy: none    Short Term Goals:  2 weeks Status  Date Met   PAIN: Pt will report worst pain of 2/10 in order to progress toward max functional ability and improve quality of life. [x] Progressing  [] Met  [] Not Met     MOBILITY: Patient will improve AROM to 50% of stated goals, listed in objective measures above, in order to progress towards independence with functional activities.  [x] Progressing  [] Met  [] Not Met     STRENGTH: Patient will improve strength to 50% of stated goals, listed in objective measures above, in order to progress towards independence  with functional activities. [x] Progressing  [] Met  [] Not Met     GAIT: Patient will demonstrate improved gait mechanics including improved foot contact in order to improve functional mobility, improve quality of life, and decrease risk of further injury or fall.  [x] Progressing  [] Met  [] Not Met     HEP: Patient will demonstrate independence with HEP in order to progress toward functional independence. [x] Progressing  [] Met  [] Not Met        Long Term Goals:  8 weeks Status Date Met   PAIN: Pt will report worst pain of 0/10 in order to progress toward max functional ability and improve quality of life [x] Progressing  [] Met  [] Not Met     MOBILITY: Patient will improve AROM to 0-120 deg bilaterally in order to return to maximal functional potential and improve quality of life.  [x] Progressing  [] Met  [] Not Met     STRENGTH: Patient will improve lower extremity strength to 5/5 in order to improve functional independence and quality of life.  [x] Progressing  [] Met  [] Not Met     GAIT: Patient will demonstrate normalized gait mechanics with minimal compensation in order to return to PLOF. [x] Progressing  [] Met  [] Not Met     Patient will return to normal ADL's, IADL's, community involvement, recreational activities, and work-related activities with less than or equal to 0/10 pain and maximal function.  [x] Progressing  [] Met  [] Not Met        Plan      Plan of care Certification: 11/15/2023 to 12/31/2023.     Outpatient Physical Therapy 2 times weekly for 8 weeks to include any combination of the following interventions: virtual visits, dry needling, modalities, electrical stimulation (IFC, Pre-Mod, Attended with Functional Dry Needling), Cervical/Lumbar Traction, Electrical Stimulation IFC, Gait Training, Manual Therapy, Moist Heat/ Ice, Neuromuscular Re-ed, Patient Education, Self Care, Therapeutic Activities, Therapeutic Exercise, and Therapeutic Activites      Silvino Chávez, PTA

## 2023-12-07 ENCOUNTER — CLINICAL SUPPORT (OUTPATIENT)
Dept: REHABILITATION | Facility: HOSPITAL | Age: 63
End: 2023-12-07
Payer: COMMERCIAL

## 2023-12-07 DIAGNOSIS — M25.561 CHRONIC PAIN OF BOTH KNEES: Primary | ICD-10-CM

## 2023-12-07 DIAGNOSIS — M25.562 CHRONIC PAIN OF BOTH KNEES: Primary | ICD-10-CM

## 2023-12-07 DIAGNOSIS — G89.29 CHRONIC PAIN OF BOTH KNEES: Primary | ICD-10-CM

## 2023-12-07 PROCEDURE — 97530 THERAPEUTIC ACTIVITIES: CPT | Mod: PO,CQ

## 2023-12-07 PROCEDURE — 97112 NEUROMUSCULAR REEDUCATION: CPT | Mod: PO,CQ

## 2023-12-07 PROCEDURE — 97110 THERAPEUTIC EXERCISES: CPT | Mod: PO,CQ

## 2023-12-07 NOTE — PROGRESS NOTES
[facility]   Physical Therapy Treatment Note      Name: Zaheer Neal  [facility] Number: [id number]    Therapy Diagnosis:   Encounter Diagnosis   Name Primary?    Chronic pain of both knees Yes         Physician: Zaheer Metcalf MD    Visit Date: 2023    Physician Orders: PT Eval and Treat  Medical Diagnosis from Referral:   Diagnosis   M25.561,M25.562 (ICD-10-CM) - Pain in both knees, unspecified chronicity   M17.0 (ICD-10-CM) - Primary osteoarthritis of both knees      Evaluation Date: 11/15/2023  Authorization Period Expiration: 2023  Plan of Care Expiration: 2023  Progress Note Due: 2023  Visit # / Visits authorized: ,   FOTO: 0/3 (last performed on 11/15/2023)     Precautions: Standard and Fall     Time In: 1:00 PM  Time Out: 1:55 PM  Total Billable Time (timed & untimed codes): 55 minutes    PTA Visit #:        Subjective     Patient reports: no new issues or concerns at this time.    He was compliant with home exercise program.  Response to previous treatment: good  Functional change: n/a    Pain: /10  Location: bilateral knee     Objective      Objective Measures updated at progress report unless specified.     Treatment     Zaheer received the treatments listed below:      therapeutic exercises to develop strength, endurance, ROM, flexibility, and posture for 25 minutes including:    Nustep 10 min L 4.0   Supine marching: 10 x 3 BTB  Supine bridges: BTB 10 x 3     manual therapy techniques: Joint mobilizations, Manual traction, Myofacial release, Manual Lymphatic Drainage, Soft tissue Mobilization, and Friction Massage were applied to the: Bilateral knee, fibular head for 0 minutes, including: fibular head mobilization     neuromuscular re-education activities to improve: Balance, Coordination, Kinesthetic, Sense, and Proprioception for 15 minutes. The following activities were included:    Hamstring stretchin sec x 4   Calf  stretching: 30 x 4   Clam shell: 10 x 3 GTB     therapeutic activities to improve functional performance for 20  minutes, including:    Shuttle: DL 10 X 3 4 string, SL 10 X 3 2 string   LAQ: BTB 10 x 3 lewis   Single leg heel tap: 8 x 3 lewis 6'' box       Patient Education and Home Exercises       Education provided:   - self care    Written Home Exercises Provided: yes. Exercises were reviewed and Zaheer was able to demonstrate them prior to the end of the session.  Zaheer demonstrated good  understanding of the education provided. See Electronic Medical Record under Patient Instructions for exercises provided during therapy sessions    Assessment     Pt continue's to tolerate strengthening exercises well.  Pt with no pain post session.  Will continue to monitor and progress pt as tolerated.     Zaheer Is progressing well towards his goals.   Patient prognosis is Good.     Patient will continue to benefit from skilled outpatient physical therapy to address the deficits listed in the problem list box on initial evaluation, provide pt/family education and to maximize pt's level of independence in the home and community environment.     Patient's spiritual, cultural and educational needs considered and pt agreeable to plan of care and goals.     Anticipated barriers to physical therapy: none    Short Term Goals:  2 weeks Status  Date Met   PAIN: Pt will report worst pain of 2/10 in order to progress toward max functional ability and improve quality of life. [x] Progressing  [] Met  [] Not Met     MOBILITY: Patient will improve AROM to 50% of stated goals, listed in objective measures above, in order to progress towards independence with functional activities.  [x] Progressing  [] Met  [] Not Met     STRENGTH: Patient will improve strength to 50% of stated goals, listed in objective measures above, in order to progress towards independence with functional activities. [x] Progressing  [] Met  [] Not Met     GAIT:  Patient will demonstrate improved gait mechanics including improved foot contact in order to improve functional mobility, improve quality of life, and decrease risk of further injury or fall.  [x] Progressing  [] Met  [] Not Met     HEP: Patient will demonstrate independence with HEP in order to progress toward functional independence. [x] Progressing  [] Met  [] Not Met        Long Term Goals:  8 weeks Status Date Met   PAIN: Pt will report worst pain of 0/10 in order to progress toward max functional ability and improve quality of life [x] Progressing  [] Met  [] Not Met     MOBILITY: Patient will improve AROM to 0-120 deg bilaterally in order to return to maximal functional potential and improve quality of life.  [x] Progressing  [] Met  [] Not Met     STRENGTH: Patient will improve lower extremity strength to 5/5 in order to improve functional independence and quality of life.  [x] Progressing  [] Met  [] Not Met     GAIT: Patient will demonstrate normalized gait mechanics with minimal compensation in order to return to PLOF. [x] Progressing  [] Met  [] Not Met     Patient will return to normal ADL's, IADL's, community involvement, recreational activities, and work-related activities with less than or equal to 0/10 pain and maximal function.  [x] Progressing  [] Met  [] Not Met        Plan      Plan of care Certification: 11/15/2023 to 12/31/2023.     Outpatient Physical Therapy 2 times weekly for 8 weeks to include any combination of the following interventions: virtual visits, dry needling, modalities, electrical stimulation (IFC, Pre-Mod, Attended with Functional Dry Needling), Cervical/Lumbar Traction, Electrical Stimulation IFC, Gait Training, Manual Therapy, Moist Heat/ Ice, Neuromuscular Re-ed, Patient Education, Self Care, Therapeutic Activities, Therapeutic Exercise, and Therapeutic Activites      Silvino Chávez, ADI

## 2023-12-11 ENCOUNTER — CLINICAL SUPPORT (OUTPATIENT)
Dept: REHABILITATION | Facility: HOSPITAL | Age: 63
End: 2023-12-11
Payer: COMMERCIAL

## 2023-12-11 DIAGNOSIS — M25.561 CHRONIC PAIN OF BOTH KNEES: ICD-10-CM

## 2023-12-11 DIAGNOSIS — M17.0 OSTEOARTHRITIS OF BOTH KNEES, UNSPECIFIED OSTEOARTHRITIS TYPE: Primary | ICD-10-CM

## 2023-12-11 DIAGNOSIS — M25.662 KNEE JOINT STIFFNESS, BILATERAL: ICD-10-CM

## 2023-12-11 DIAGNOSIS — M25.562 CHRONIC PAIN OF BOTH KNEES: ICD-10-CM

## 2023-12-11 DIAGNOSIS — M25.661 KNEE JOINT STIFFNESS, BILATERAL: ICD-10-CM

## 2023-12-11 DIAGNOSIS — G89.29 CHRONIC PAIN OF BOTH KNEES: ICD-10-CM

## 2023-12-11 PROCEDURE — 97112 NEUROMUSCULAR REEDUCATION: CPT | Mod: PO

## 2023-12-11 PROCEDURE — 97530 THERAPEUTIC ACTIVITIES: CPT | Mod: PO

## 2023-12-11 PROCEDURE — 97140 MANUAL THERAPY 1/> REGIONS: CPT | Mod: PO

## 2023-12-11 PROCEDURE — 97110 THERAPEUTIC EXERCISES: CPT | Mod: PO

## 2023-12-11 NOTE — PROGRESS NOTES
[facility]   Physical Therapy Treatment Note      Name: Zaheer Neal  [facility] Number: [id number]    Therapy Diagnosis:   Encounter Diagnoses   Name Primary?    Osteoarthritis of both knees, unspecified osteoarthritis type Yes    Knee joint stiffness, bilateral     Chronic pain of both knees        Physician: Zaheer Metcalf MD    Visit Date: 12/11/2023    Physician Orders: PT Eval and Treat  Medical Diagnosis from Referral:      M25.561,M25.562 (ICD-10-CM) - Pain in both knees, unspecified chronicity   M17.0 (ICD-10-CM) - Primary osteoarthritis of both knees      Evaluation Date: 11/15/2023  Authorization Period Expiration: 12/31/2023  Plan of Care Expiration: 12/31/2023  Progress Note Due: 12/23/2023  Visit # / Visits authorized: 1/1, 7/20  FOTO: 0/3 (last performed on 11/15/2023)     Precautions: Standard and Fall     Time In: 1:00 PM  Time Out: 1:58 PM  Total Billable Time (timed & untimed codes): 58 minutes    PTA Visit #: 0/5       Subjective     Patient reports: mild stiffness and cracking during walking, but his knee pain is improving.     He was compliant with home exercise program.  Response to previous treatment: good  Functional change: n/a    Pain: /10  Location: bilateral knee     Objective      Objective Measures updated at progress report unless specified.     Treatment     Zaheer received the treatments listed below:      therapeutic exercises to develop strength, endurance, ROM, flexibility, and posture for 23 minutes including:    Nustep 10 min L 4.0   Supine marching: 10 x 3 BTB  Supine bridges: BTB 10 x 3     manual therapy techniques: Joint mobilizations, Manual traction, Myofacial release, Manual Lymphatic Drainage, Soft tissue Mobilization, and Friction Massage were applied to the: Bilateral knee, fibular head for 8 minutes, including: fibular head mobilization     neuromuscular re-education activities to improve: Balance, Coordination, Kinesthetic,  Sense, and Proprioception for 15 minutes. The following activities were included:    Hamstring stretchin sec x 4   Calf stretching: 30 x 4   Clam shell: 10 x 3 GTB   Pallof: silver band 10 x2     therapeutic activities to improve functional performance for 10  minutes, including:    Shuttle: DL 10 X 3 4 string, SL 10 X 3 2 string   LAQ: BTB 10 x 3 lewis   Single leg heel tap: 8 x 3 lewis 6'' box       Patient Education and Home Exercises       Education provided:   - self care    Written Home Exercises Provided: yes. Exercises were reviewed and Zaheer was able to demonstrate them prior to the end of the session.  Zaheer demonstrated good  understanding of the education provided. See Electronic Medical Record under Patient Instructions for exercises provided during therapy sessions    Assessment     Right fibular head is less mobile than right side. Crackling sound during patella movement bilaterally. Pt demonstrates improved lower extremity strength and stability. No pain or discomfort post-session. Will progress him as he can tolerate it.     Zaheer Is progressing well towards his goals.   Patient prognosis is Good.     Patient will continue to benefit from skilled outpatient physical therapy to address the deficits listed in the problem list box on initial evaluation, provide pt/family education and to maximize pt's level of independence in the home and community environment.     Patient's spiritual, cultural and educational needs considered and pt agreeable to plan of care and goals.     Anticipated barriers to physical therapy: none    Short Term Goals:  2 weeks Status  Date Met   PAIN: Pt will report worst pain of 2/10 in order to progress toward max functional ability and improve quality of life. [x] Progressing  [] Met  [] Not Met     MOBILITY: Patient will improve AROM to 50% of stated goals, listed in objective measures above, in order to progress towards independence with functional activities.  [x]  Progressing  [] Met  [] Not Met     STRENGTH: Patient will improve strength to 50% of stated goals, listed in objective measures above, in order to progress towards independence with functional activities. [x] Progressing  [] Met  [] Not Met     GAIT: Patient will demonstrate improved gait mechanics including improved foot contact in order to improve functional mobility, improve quality of life, and decrease risk of further injury or fall.  [x] Progressing  [] Met  [] Not Met     HEP: Patient will demonstrate independence with HEP in order to progress toward functional independence. [x] Progressing  [] Met  [] Not Met        Long Term Goals:  8 weeks Status Date Met   PAIN: Pt will report worst pain of 0/10 in order to progress toward max functional ability and improve quality of life [x] Progressing  [] Met  [] Not Met     MOBILITY: Patient will improve AROM to 0-120 deg bilaterally in order to return to maximal functional potential and improve quality of life.  [x] Progressing  [] Met  [] Not Met     STRENGTH: Patient will improve lower extremity strength to 5/5 in order to improve functional independence and quality of life.  [x] Progressing  [] Met  [] Not Met     GAIT: Patient will demonstrate normalized gait mechanics with minimal compensation in order to return to PLOF. [x] Progressing  [] Met  [] Not Met     Patient will return to normal ADL's, IADL's, community involvement, recreational activities, and work-related activities with less than or equal to 0/10 pain and maximal function.  [x] Progressing  [] Met  [] Not Met        Plan      Plan of care Certification: 11/15/2023 to 12/31/2023.     Outpatient Physical Therapy 2 times weekly for 8 weeks to include any combination of the following interventions: virtual visits, dry needling, modalities, electrical stimulation (IFC, Pre-Mod, Attended with Functional Dry Needling), Cervical/Lumbar Traction, Electrical Stimulation IFC, Gait Training, Manual Therapy,  Moist Heat/ Ice, Neuromuscular Re-ed, Patient Education, Self Care, Therapeutic Activities, Therapeutic Exercise, and Therapeutic Activites      Yandy Willis, PT, DPT

## 2023-12-14 ENCOUNTER — CLINICAL SUPPORT (OUTPATIENT)
Dept: REHABILITATION | Facility: HOSPITAL | Age: 63
End: 2023-12-14
Payer: COMMERCIAL

## 2023-12-14 DIAGNOSIS — M17.0 OSTEOARTHRITIS OF BOTH KNEES, UNSPECIFIED OSTEOARTHRITIS TYPE: Primary | ICD-10-CM

## 2023-12-14 PROCEDURE — 97530 THERAPEUTIC ACTIVITIES: CPT | Mod: PO,CQ

## 2023-12-14 PROCEDURE — 97110 THERAPEUTIC EXERCISES: CPT | Mod: PO,CQ

## 2023-12-14 PROCEDURE — 97112 NEUROMUSCULAR REEDUCATION: CPT | Mod: PO,CQ

## 2023-12-14 NOTE — PROGRESS NOTES
[facility]   Physical Therapy Treatment Note      Name: Zaheer Neal  [facility] Number: [id number]    Therapy Diagnosis:   Encounter Diagnosis   Name Primary?    Osteoarthritis of both knees, unspecified osteoarthritis type Yes         Physician: Zaheer Metcalf MD    Visit Date: 2023    Physician Orders: PT Eval and Treat  Medical Diagnosis from Referral:      M25.561,M25.562 (ICD-10-CM) - Pain in both knees, unspecified chronicity   M17.0 (ICD-10-CM) - Primary osteoarthritis of both knees      Evaluation Date: 11/15/2023  Authorization Period Expiration: 2023  Plan of Care Expiration: 2023  Progress Note Due: 2023  Visit # / Visits authorized: ,   FOTO: 0/3 (last performed on 11/15/2023)     Precautions: Standard and Fall     Time In: 1:00 PM  Time Out: 1: PM  Total Billable Time (timed & untimed codes):  minutes    PTA Visit #:        Subjective     Patient reports:  He was compliant with home exercise program.  Response to previous treatment: good  Functional change: n/a    Pain: /10  Location: bilateral knee     Objective      Objective Measures updated at progress report unless specified.     Treatment     Zaheer received the treatments listed below:      therapeutic exercises to develop strength, endurance, ROM, flexibility, and posture for 25 minutes including:    Nustep 10 min L 4.0   Supine marching: 10 x 3 BTB   Supine bridges: BTB 10 x 3     manual therapy techniques: Joint mobilizations, Manual traction, Myofacial release, Manual Lymphatic Drainage, Soft tissue Mobilization, and Friction Massage were applied to the: Bilateral knee, fibular head for 0 minutes, including: fibular head mobilization     neuromuscular re-education activities to improve: Balance, Coordination, Kinesthetic, Sense, and Proprioception for 15 minutes. The following activities were included:    Hamstring stretchin sec x 4   Calf stretching: 30 x 4    Clam shell: 10 x 3 GTB   Pallof: silver band 10 x 2     therapeutic activities to improve functional performance for 15  minutes, including:    Shuttle: DL 10 X 3 4 string, SL 10 X 3 2 string   LAQ: BTB 10 x 3 lewis   Single leg heel tap: 8 x 3 lewis 6'' box       Patient Education and Home Exercises       Education provided:   - self care    Written Home Exercises Provided: yes. Exercises were reviewed and Zaheer was able to demonstrate them prior to the end of the session.  Zaheer demonstrated good  understanding of the education provided. See Electronic Medical Record under Patient Instructions for exercises provided during therapy sessions    Assessment     Pt with no pain post session.  Pt continues to work towards improving his B LE strength.  Will continue to monitor and progress pt as tolerated.     Zaheer Is progressing well towards his goals.   Patient prognosis is Good.     Patient will continue to benefit from skilled outpatient physical therapy to address the deficits listed in the problem list box on initial evaluation, provide pt/family education and to maximize pt's level of independence in the home and community environment.     Patient's spiritual, cultural and educational needs considered and pt agreeable to plan of care and goals.     Anticipated barriers to physical therapy: none    Short Term Goals:  2 weeks Status  Date Met   PAIN: Pt will report worst pain of 2/10 in order to progress toward max functional ability and improve quality of life. [x] Progressing  [] Met  [] Not Met     MOBILITY: Patient will improve AROM to 50% of stated goals, listed in objective measures above, in order to progress towards independence with functional activities.  [x] Progressing  [] Met  [] Not Met     STRENGTH: Patient will improve strength to 50% of stated goals, listed in objective measures above, in order to progress towards independence with functional activities. [x] Progressing  [] Met  [] Not Met      GAIT: Patient will demonstrate improved gait mechanics including improved foot contact in order to improve functional mobility, improve quality of life, and decrease risk of further injury or fall.  [x] Progressing  [] Met  [] Not Met     HEP: Patient will demonstrate independence with HEP in order to progress toward functional independence. [x] Progressing  [] Met  [] Not Met        Long Term Goals:  8 weeks Status Date Met   PAIN: Pt will report worst pain of 0/10 in order to progress toward max functional ability and improve quality of life [x] Progressing  [] Met  [] Not Met     MOBILITY: Patient will improve AROM to 0-120 deg bilaterally in order to return to maximal functional potential and improve quality of life.  [x] Progressing  [] Met  [] Not Met     STRENGTH: Patient will improve lower extremity strength to 5/5 in order to improve functional independence and quality of life.  [x] Progressing  [] Met  [] Not Met     GAIT: Patient will demonstrate normalized gait mechanics with minimal compensation in order to return to PLOF. [x] Progressing  [] Met  [] Not Met     Patient will return to normal ADL's, IADL's, community involvement, recreational activities, and work-related activities with less than or equal to 0/10 pain and maximal function.  [x] Progressing  [] Met  [] Not Met        Plan      Plan of care Certification: 11/15/2023 to 12/31/2023.     Outpatient Physical Therapy 2 times weekly for 8 weeks to include any combination of the following interventions: virtual visits, dry needling, modalities, electrical stimulation (IFC, Pre-Mod, Attended with Functional Dry Needling), Cervical/Lumbar Traction, Electrical Stimulation IFC, Gait Training, Manual Therapy, Moist Heat/ Ice, Neuromuscular Re-ed, Patient Education, Self Care, Therapeutic Activities, Therapeutic Exercise, and Therapeutic Activites      Silvino Chávez, ADI

## 2023-12-18 ENCOUNTER — CLINICAL SUPPORT (OUTPATIENT)
Dept: REHABILITATION | Facility: HOSPITAL | Age: 63
End: 2023-12-18
Payer: COMMERCIAL

## 2023-12-18 DIAGNOSIS — M25.662 KNEE JOINT STIFFNESS, BILATERAL: Primary | ICD-10-CM

## 2023-12-18 DIAGNOSIS — G89.29 CHRONIC PAIN OF BOTH KNEES: ICD-10-CM

## 2023-12-18 DIAGNOSIS — M25.561 CHRONIC PAIN OF BOTH KNEES: ICD-10-CM

## 2023-12-18 DIAGNOSIS — M25.661 KNEE JOINT STIFFNESS, BILATERAL: Primary | ICD-10-CM

## 2023-12-18 DIAGNOSIS — M25.562 CHRONIC PAIN OF BOTH KNEES: ICD-10-CM

## 2023-12-18 PROCEDURE — 97530 THERAPEUTIC ACTIVITIES: CPT | Mod: PO

## 2023-12-18 PROCEDURE — 97112 NEUROMUSCULAR REEDUCATION: CPT | Mod: PO

## 2023-12-18 PROCEDURE — 97140 MANUAL THERAPY 1/> REGIONS: CPT | Mod: PO

## 2023-12-18 PROCEDURE — 97110 THERAPEUTIC EXERCISES: CPT | Mod: PO

## 2023-12-18 NOTE — PROGRESS NOTES
[facility]   Physical Therapy Treatment Note      Name: Zaheer Neal  Clinic Number: [id number]    Therapy Diagnosis:   No diagnosis found.        Physician: Zaheer Metcalf MD    Visit Date: 2023    Physician Orders: PT Eval and Treat  Medical Diagnosis from Referral:      M25.561,M25.562 (ICD-10-CM) - Pain in both knees, unspecified chronicity   M17.0 (ICD-10-CM) - Primary osteoarthritis of both knees      Evaluation Date: 11/15/2023  Authorization Period Expiration: 2023  Plan of Care Expiration: 2023  Progress Note Due: 2023  Visit # / Visits authorized: ,   FOTO: 0/3 (last performed on 11/15/2023)     Precautions: Standard and Fall     Time In: 1:00 PM  Time Out: 1: 45 PM  Total Billable Time (timed & untimed codes): 45  minutes    PTA Visit #: 0/5       Subjective     Patient reports:No knee pain at this moment.     He was compliant with home exercise program.  Response to previous treatment: good  Functional change: n/a    Pain: 0/10  Location: bilateral knee     Objective      Objective Measures updated at progress report unless specified.     Treatment     Zaheer received the treatments listed below:      therapeutic exercises to develop strength, endurance, ROM, flexibility, and posture for 25 minutes including:    Nustep 10 min L 4.0   Supine marching: 10 x 3 BTB   Supine bridges: BTB 10 x 3 single leg  Monster walk: 10 x3 RTB  Lateral walk 10 x3 RTB    manual therapy techniques: Joint mobilizations, Manual traction, Myofacial release, Manual Lymphatic Drainage, Soft tissue Mobilization, and Friction Massage were applied to the: Bilateral knee, fibular head for 8 minutes, including: fibular head mobilization     neuromuscular re-education activities to improve: Balance, Coordination, Kinesthetic, Sense, and Proprioception for 10 minutes. The following activities were included:    Hamstring stretchin sec x 4   Calf stretching:  30 x 4   Sit to stand: 10 x3 10 #  Pallof: silver band 10 x 2     therapeutic activities to improve functional performance for 8  minutes, including:    Shuttle: DL 10 X 3 4 string, SL 10 X 3 2 string   LAQ: BTB 10 x 3 lewis   Single leg heel tap: 8 x 3 lewis 6'' box       Patient Education and Home Exercises       Education provided:   - self care    Written Home Exercises Provided: yes. Exercises were reviewed and Zaheer was able to demonstrate them prior to the end of the session.  Zaheer demonstrated good  understanding of the education provided. See Electronic Medical Record under Patient Instructions for exercises provided during therapy sessions    Assessment     Pt presents with improved bilateral patellar and fibular head mobility. His left leg is more weaker than right during single leg bridges and LAQ. Will continue to monitor and progress pt as tolerated.     Zaheer Is progressing well towards his goals.   Patient prognosis is Good.     Patient will continue to benefit from skilled outpatient physical therapy to address the deficits listed in the problem list box on initial evaluation, provide pt/family education and to maximize pt's level of independence in the home and community environment.     Patient's spiritual, cultural and educational needs considered and pt agreeable to plan of care and goals.     Anticipated barriers to physical therapy: none    Short Term Goals:  2 weeks Status  Date Met   PAIN: Pt will report worst pain of 2/10 in order to progress toward max functional ability and improve quality of life. [x] Progressing  [] Met  [] Not Met     MOBILITY: Patient will improve AROM to 50% of stated goals, listed in objective measures above, in order to progress towards independence with functional activities.  [x] Progressing  [] Met  [] Not Met     STRENGTH: Patient will improve strength to 50% of stated goals, listed in objective measures above, in order to progress towards independence with  functional activities. [x] Progressing  [] Met  [] Not Met     GAIT: Patient will demonstrate improved gait mechanics including improved foot contact in order to improve functional mobility, improve quality of life, and decrease risk of further injury or fall.  [x] Progressing  [] Met  [] Not Met     HEP: Patient will demonstrate independence with HEP in order to progress toward functional independence. [x] Progressing  [] Met  [] Not Met        Long Term Goals:  8 weeks Status Date Met   PAIN: Pt will report worst pain of 0/10 in order to progress toward max functional ability and improve quality of life [x] Progressing  [] Met  [] Not Met     MOBILITY: Patient will improve AROM to 0-120 deg bilaterally in order to return to maximal functional potential and improve quality of life.  [x] Progressing  [] Met  [] Not Met     STRENGTH: Patient will improve lower extremity strength to 5/5 in order to improve functional independence and quality of life.  [x] Progressing  [] Met  [] Not Met     GAIT: Patient will demonstrate normalized gait mechanics with minimal compensation in order to return to PLOF. [x] Progressing  [] Met  [] Not Met     Patient will return to normal ADL's, IADL's, community involvement, recreational activities, and work-related activities with less than or equal to 0/10 pain and maximal function.  [x] Progressing  [] Met  [] Not Met        Plan      Plan of care Certification: 11/15/2023 to 12/31/2023.     Outpatient Physical Therapy 2 times weekly for 8 weeks to include any combination of the following interventions: virtual visits, dry needling, modalities, electrical stimulation (IFC, Pre-Mod, Attended with Functional Dry Needling), Cervical/Lumbar Traction, Electrical Stimulation IFC, Gait Training, Manual Therapy, Moist Heat/ Ice, Neuromuscular Re-ed, Patient Education, Self Care, Therapeutic Activities, Therapeutic Exercise, and Therapeutic Activites      Yandy Willis, PT, DPT

## 2023-12-21 ENCOUNTER — CLINICAL SUPPORT (OUTPATIENT)
Dept: REHABILITATION | Facility: HOSPITAL | Age: 63
End: 2023-12-21
Payer: COMMERCIAL

## 2023-12-21 DIAGNOSIS — M25.562 CHRONIC PAIN OF BOTH KNEES: Primary | ICD-10-CM

## 2023-12-21 DIAGNOSIS — M25.661 KNEE JOINT STIFFNESS, BILATERAL: ICD-10-CM

## 2023-12-21 DIAGNOSIS — G89.29 CHRONIC PAIN OF BOTH KNEES: Primary | ICD-10-CM

## 2023-12-21 DIAGNOSIS — M25.561 CHRONIC PAIN OF BOTH KNEES: Primary | ICD-10-CM

## 2023-12-21 DIAGNOSIS — M25.662 KNEE JOINT STIFFNESS, BILATERAL: ICD-10-CM

## 2023-12-21 PROCEDURE — 97112 NEUROMUSCULAR REEDUCATION: CPT | Mod: PO

## 2023-12-21 PROCEDURE — 97110 THERAPEUTIC EXERCISES: CPT | Mod: PO

## 2023-12-21 PROCEDURE — 97530 THERAPEUTIC ACTIVITIES: CPT | Mod: PO

## 2023-12-21 NOTE — PROGRESS NOTES
[facility]   Physical Therapy Treatment Note      Name: Zaheer Neal  [facility] Number: [id number]    Therapy Diagnosis:   Encounter Diagnoses   Name Primary?    Chronic pain of both knees Yes    Knee joint stiffness, bilateral        Physician: Zaheer Metcalf MD    Visit Date: 12/21/2023    Physician Orders: PT Eval and Treat  Medical Diagnosis from Referral:      M25.561,M25.562 (ICD-10-CM) - Pain in both knees, unspecified chronicity   M17.0 (ICD-10-CM) - Primary osteoarthritis of both knees      Evaluation Date: 11/15/2023  Authorization Period Expiration: 12/31/2023  Plan of Care Expiration: 12/31/2023  Progress Note Due: 12/23/2023  Visit # / Visits authorized: 1/1, 10/20  FOTO: 1/3 (last performed on 11/15/2023)     Precautions: Standard and Fall     Time In: 4:00 PM  Time Out: 4: 55 PM  Total Billable Time (timed & untimed codes): 55  minutes    PTA Visit #: 0/5       Subjective     Patient reports: he did better when he climbing stairs.     He was compliant with home exercise program.  Response to previous treatment: good  Functional change: n/a    Pain: 0/10  Location: bilateral knee     Objective      Objective Measures updated at progress report unless specified.     Treatment     Zaheer received the treatments listed below:      therapeutic exercises to develop strength, endurance, ROM, flexibility, and posture for 23 minutes including:    Nustep 10 min L 5.0   Standing marching: 10 x 3 BTB   Single leg RDL: 10 x3 lewis   Monster walk: 10 x3 GTB  Lateral walk 10 x3 GTB    manual therapy techniques: Joint mobilizations, Manual traction, Myofacial release, Manual Lymphatic Drainage, Soft tissue Mobilization, and Friction Massage were applied to the: Bilateral knee, fibular head for 0 minutes, including: fibular head mobilization     neuromuscular re-education activities to improve: Balance, Coordination, Kinesthetic, Sense, and Proprioception for 23 minutes. The  following activities were included:    Hamstring stretchin sec x 4   Calf stretching: 30 x 4   Sit to stand: 10 x3 10 #  Pallof: silver band 10 x 2     therapeutic activities to improve functional performance for 8  minutes, including:    Shuttle: DL 10 X 3 4 string, SL 10 X 3 2 string   Standing hip extension: 10 x 3 lewis   Single step up and down 10#: 8 x 3 lewis 6'' box       Patient Education and Home Exercises       Education provided:   - self care    Written Home Exercises Provided: yes. Exercises were reviewed and Zaheer was able to demonstrate them prior to the end of the session.  Zaheer demonstrated good  understanding of the education provided. See Electronic Medical Record under Patient Instructions for exercises provided during therapy sessions    Assessment     Pt reports left side single leg RDL causes mild knee pain. His left leg is weaker than the right. He performed all other exercises well. Will continue to monitor and progress pt as tolerated.     Zaheer Is progressing well towards his goals.   Patient prognosis is Good.     Patient will continue to benefit from skilled outpatient physical therapy to address the deficits listed in the problem list box on initial evaluation, provide pt/family education and to maximize pt's level of independence in the home and community environment.     Patient's spiritual, cultural and educational needs considered and pt agreeable to plan of care and goals.     Anticipated barriers to physical therapy: none    Short Term Goals:  2 weeks Status  Date Met   PAIN: Pt will report worst pain of 2/10 in order to progress toward max functional ability and improve quality of life. [x] Progressing  [] Met  [] Not Met     MOBILITY: Patient will improve AROM to 50% of stated goals, listed in objective measures above, in order to progress towards independence with functional activities.  [x] Progressing  [] Met  [] Not Met     STRENGTH: Patient will improve strength to  50% of stated goals, listed in objective measures above, in order to progress towards independence with functional activities. [x] Progressing  [] Met  [] Not Met     GAIT: Patient will demonstrate improved gait mechanics including improved foot contact in order to improve functional mobility, improve quality of life, and decrease risk of further injury or fall.  [x] Progressing  [] Met  [] Not Met     HEP: Patient will demonstrate independence with HEP in order to progress toward functional independence. [x] Progressing  [] Met  [] Not Met        Long Term Goals:  8 weeks Status Date Met   PAIN: Pt will report worst pain of 0/10 in order to progress toward max functional ability and improve quality of life [x] Progressing  [] Met  [] Not Met     MOBILITY: Patient will improve AROM to 0-120 deg bilaterally in order to return to maximal functional potential and improve quality of life.  [x] Progressing  [] Met  [] Not Met     STRENGTH: Patient will improve lower extremity strength to 5/5 in order to improve functional independence and quality of life.  [x] Progressing  [] Met  [] Not Met     GAIT: Patient will demonstrate normalized gait mechanics with minimal compensation in order to return to PLOF. [x] Progressing  [] Met  [] Not Met     Patient will return to normal ADL's, IADL's, community involvement, recreational activities, and work-related activities with less than or equal to 0/10 pain and maximal function.  [x] Progressing  [] Met  [] Not Met        Plan      Plan of care Certification: 11/15/2023 to 12/31/2023.     Outpatient Physical Therapy 2 times weekly for 8 weeks to include any combination of the following interventions: virtual visits, dry needling, modalities, electrical stimulation (IFC, Pre-Mod, Attended with Functional Dry Needling), Cervical/Lumbar Traction, Electrical Stimulation IFC, Gait Training, Manual Therapy, Moist Heat/ Ice, Neuromuscular Re-ed, Patient Education, Self Care, Therapeutic  Activities, Therapeutic Exercise, and Therapeutic Activites      Yandy Willis, PT, DPT

## 2024-01-02 ENCOUNTER — ANESTHESIA EVENT (OUTPATIENT)
Dept: ENDOSCOPY | Facility: HOSPITAL | Age: 64
End: 2024-01-02
Payer: COMMERCIAL

## 2024-01-02 NOTE — ANESTHESIA PREPROCEDURE EVALUATION
01/02/2024  Zaheer Neal is a 63 y.o., male.    [facility]  Anesthesia Pre-Operative Evaluation       Patient Name: Zaheer Neal  YOB: 1960  MRN: [id number]  [facility]: [id number]      Code Status: No Order   Date of Procedure: 1/4/2024  Anesthesia: Choice Procedure: Procedure(s) (LRB):  COLONOSCOPY (N/A)  Pre-Operative Diagnosis: Colon cancer screening [Z12.11]  History of colon polyps [Z86.010]  Proceduralist: Surgeon(s) and Role:     * Marely Gómez MD - Primary Nurse: (Unknown)      SUBJECTIVE:   Zaheer Neal is a 63 y.o. male who  has a past medical history of Chronic urticaria (11/12/2014), Colonic polyp, Malignant neoplasm of thyroid gland, Obesity, Post-surgical hypothyroidism, PUD (peptic ulcer disease), and Urticaria..     he has a current medication list which includes the following long-term medication(s): diclofenac sodium, epinephrine, famotidine, rosuvastatin, sildenafil, tamsulosin, and tirosint.     ALLERGIES:     Review of patient's allergies indicates:   Allergen Reactions    Shellfish containing products Hives     LDA:          Lines/Drains/Airways       None                  Anesthesia Evaluation      Airway   Dental      Pulmonary    Cardiovascular   Exercise tolerance: good    ECG reviewed    Neuro/Psych      GI/Hepatic/Renal    (+) PUD    Endo/Other    (+) hypothyroidism, arthritis  Abdominal                     MEDICATIONS:     Antibiotics (From admission, onward)      None          VTE Risk Mitigation (From admission, onward)      None              No current facility-administered medications for this encounter.     Current Outpatient Medications   Medication Sig Dispense Refill    cephALEXin (KEFLEX) 250 MG capsule Take 500 mg by mouth 2 (two) times daily.      cetirizine (ZYRTEC) 10 MG tablet Take 10 mg by mouth.       diclofenac sodium (VOLTAREN) 1 % Gel Apply 2 g topically 4 (four) times daily. To areas of foot pain (Patient not taking: Reported on 10/26/2023) 100 g 3    EPINEPHrine (EPIPEN) 0.3 mg/0.3 mL AtIn Inject 0.3 mLs (0.3 mg total) into the muscle once. for 1 dose 1 each 0    famotidine (PEPCID) 20 MG tablet Take 1 tablet (20 mg total) by mouth 2 (two) times daily. (Patient not taking: Reported on 10/26/2023) 60 tablet 1    fluticasone propionate (FLONASE) 50 mcg/actuation nasal spray 1 spray (50 mcg total) by Each Nostril route 2 (two) times a day. (Patient not taking: Reported on 10/26/2023) 16 g 0    guaiFENesin-codeine 100-10 mg/5 ml (TUSSI-ORGANIDIN NR)  mg/5 mL syrup Take by mouth.      hydrOXYzine HCL (ATARAX) 25 MG tablet 1 to 8 tablets at bedtime.  Start with 3 tablets.  Increase or decrease as needed until itching is controlled or a maximum of 8 tablets. (Patient not taking: Reported on 10/26/2023) 240 tablet 3    Lactobacillus rhamnosus GG (CULTURELLE) 10 billion cell capsule Take 1 capsule by mouth once daily.      POLY HIST FORTE 10.5-10 mg Tab Take 1 tablet by mouth every 8 (eight) hours as needed.      predniSONE (DELTASONE) 10 MG tablet Take 40 mg by mouth.      rosuvastatin (CRESTOR) 20 MG tablet Take 1 tablet (20 mg total) by mouth once daily. 90 tablet 1    sildenafiL (VIAGRA) 50 MG tablet Take 1 tablet (50 mg total) by mouth daily as needed for Erectile Dysfunction. (Patient not taking: Reported on 10/26/2023) 30 tablet 2    tamsulosin (FLOMAX) 0.4 mg Cap Take 1 capsule (0.4 mg total) by mouth once daily. 90 capsule 3    TIROSINT 150 mcg Cap Take 150 mcg by mouth once daily. (Patient not taking: Reported on 10/26/2023) 90 capsule 3          History:   There are no hospital problems to display for this patient.    Surgical History:    has a past surgical history that includes Knee surgery (8/2013); Thyroidectomy; and Colonoscopy (N/A, 2/5/2018).   Social History:    reports being sexually active  and has had partner(s) who are female.  reports that he has never smoked. He has never used smokeless tobacco. He reports current alcohol use. He reports that he does not use drugs.     OBJECTIVE:     Vital Signs (Most Recent):    Vital Signs Range (Last 24H):          There is no height or weight on file to calculate BMI.   Wt Readings from Last 4 Encounters:   10/26/23 133 kg (293 lb 3.4 oz)   10/05/23 134.3 kg (296 lb)   09/12/23 134.6 kg (296 lb 11.8 oz)   08/18/23 134 kg (295 lb 6.7 oz)       Significant Labs:  Lab Results   Component Value Date    WBC 4.06 03/01/2023    HGB 12.8 (L) 03/01/2023    HCT 40.3 03/01/2023     03/01/2023     05/15/2023    K 4.6 05/15/2023     05/15/2023    CREATININE 1.1 05/15/2023    BUN 15 05/15/2023    CO2 30 (H) 05/15/2023    GLU 92 05/15/2023    CALCIUM 9.8 05/15/2023    ALKPHOS 67 09/06/2023    ALT 37 09/28/2023    AST 42 09/28/2023    ALBUMIN 4.1 09/06/2023    INR 1.0 09/18/2011    APTT 24.0 09/18/2011    HGBA1C 5.4 07/12/2023     (H) 09/06/2023    CPKMB 1.1 09/19/2011    TROPONINI <0.006 09/19/2011    MB text 09/19/2011     No LMP for male patient.  No results found for this or any previous visit (from the past 72 hour(s)).    EKG:   Results for orders placed or performed during the hospital encounter of 05/16/23   EKG 12-lead    Collection Time: 05/16/23 10:25 AM    Narrative    Test Reason : E78.5,Z91.89,    Vent. Rate : 073 BPM     Atrial Rate : 073 BPM     P-R Int : 228 ms          QRS Dur : 082 ms      QT Int : 378 ms       P-R-T Axes : 028 018 035 degrees     QTc Int : 416 ms    Sinus rhythm with 1st degree A-V block  Possible Left atrial enlargement  Nonspecific T wave abnormality  Abnormal ECG  When compared with ECG of 08-AUG-2013 15:31,  No significant change was found  Confirmed by Puma STARKS, Zaheer HARRY ([id number]) on 5/16/2023 3:53:14 PM    Referred By: TRE NUNEZ           Confirmed By:Zaheer Bartholomew MD       TTE:  No results found for this  "or any previous visit.  EF   Date Value Ref Range Status   07/06/2023 63 % Final      No results found for this or any previous visit.  QUINTIN:  No results found for this or any previous visit.  Stress Test:  No results found for this or any previous visit.     LHC:  No results found for this or any previous visit.     PFT:  No results found for: "FEV1", "FVC", "ROI1UZA", "TLC", "DLCO"     ASSESSMENT/PLAN:      Pre-op Assessment    I have reviewed the Patient Summary Reports.     I have reviewed the Nursing Notes. I have reviewed the NPO Status.   I have reviewed the Medications.     Review of Systems  Anesthesia Hx:  No problems with previous Anesthesia             Denies Family Hx of Anesthesia complications.    Denies Personal Hx of Anesthesia complications.                    Hematology/Oncology:  Hematology Normal   Oncology Normal                                   EENT/Dental:  EENT/Dental Normal           Cardiovascular:  Cardiovascular Normal Exercise tolerance: good                 ECG has been reviewed.                          Pulmonary:  Pulmonary Normal                       Renal/:  Renal/ Normal                 Hepatic/GI:   PUD,               Musculoskeletal:  Arthritis               Neurological:  Neurology Normal                                      Endocrine:   Hypothyroidism          Dermatological:  Skin Normal    Psych:  Psychiatric Normal                       Anesthesia Plan  Type of Anesthesia, risks & benefits discussed:    Anesthesia Type: Gen Natural Airway  Intra-op Monitoring Plan: Standard ASA Monitors  Post Op Pain Control Plan: multimodal analgesia and IV/PO Opioids PRN  Induction:  IV  Informed Consent: Informed consent signed with the Patient and all parties understand the risks and agree with anesthesia plan.  All questions answered. Patient consented to blood products? No  ASA Score: 3  Day of Surgery Review of History & Physical: H&P Update referred to the surgeon/provider.I " have interviewed and examined the patient. I have reviewed the patient's H&P dated: There are no significant changes.     Ready For Surgery From Anesthesia Perspective.     .

## 2024-01-04 ENCOUNTER — HOSPITAL ENCOUNTER (OUTPATIENT)
Facility: HOSPITAL | Age: 64
Discharge: HOME | End: 2024-01-04
Attending: INTERNAL MEDICINE | Admitting: INTERNAL MEDICINE
Payer: COMMERCIAL

## 2024-01-04 ENCOUNTER — ANESTHESIA (OUTPATIENT)
Dept: ENDOSCOPY | Facility: HOSPITAL | Age: 64
End: 2024-01-04
Payer: COMMERCIAL

## 2024-01-04 VITALS
RESPIRATION RATE: 18 BRPM | DIASTOLIC BLOOD PRESSURE: 73 MMHG | SYSTOLIC BLOOD PRESSURE: 134 MMHG | HEIGHT: 78 IN | WEIGHT: 290 LBS | BODY MASS INDEX: 33.55 KG/M2 | TEMPERATURE: 98 F | HEART RATE: 76 BPM | OXYGEN SATURATION: 100 %

## 2024-01-04 DIAGNOSIS — Z86.010 HISTORY OF COLON POLYPS: Primary | ICD-10-CM

## 2024-01-04 PROBLEM — K63.5 COLON POLYPS: Status: ACTIVE | Noted: 2024-01-04

## 2024-01-04 PROCEDURE — 45385 COLONOSCOPY W/LESION REMOVAL: CPT | Mod: 33,,, | Performed by: INTERNAL MEDICINE

## 2024-01-04 PROCEDURE — 63600175 PHARM REV CODE 636 W HCPCS: Performed by: NURSE ANESTHETIST, CERTIFIED REGISTERED

## 2024-01-04 PROCEDURE — 37000009 HC ANESTHESIA EA ADD 15 MINS: Performed by: INTERNAL MEDICINE

## 2024-01-04 PROCEDURE — 99900035 HC TECH TIME PER 15 MIN (STAT)

## 2024-01-04 PROCEDURE — 25000003 PHARM REV CODE 250: Performed by: NURSE ANESTHETIST, CERTIFIED REGISTERED

## 2024-01-04 PROCEDURE — 88305 TISSUE EXAM BY PATHOLOGIST: CPT | Performed by: PATHOLOGY

## 2024-01-04 PROCEDURE — 94761 N-INVAS EAR/PLS OXIMETRY MLT: CPT

## 2024-01-04 PROCEDURE — D9220A PRA ANESTHESIA: Mod: 33,,, | Performed by: NURSE ANESTHETIST, CERTIFIED REGISTERED

## 2024-01-04 PROCEDURE — 88305 TISSUE EXAM BY PATHOLOGIST: CPT | Mod: 26,,, | Performed by: PATHOLOGY

## 2024-01-04 PROCEDURE — 37000008 HC ANESTHESIA 1ST 15 MINUTES: Performed by: INTERNAL MEDICINE

## 2024-01-04 PROCEDURE — 27201089 HC SNARE, DISP (ANY): Performed by: INTERNAL MEDICINE

## 2024-01-04 PROCEDURE — 45385 COLONOSCOPY W/LESION REMOVAL: CPT | Mod: 33 | Performed by: INTERNAL MEDICINE

## 2024-01-04 RX ORDER — LIDOCAINE HYDROCHLORIDE 20 MG/ML
INJECTION INTRAVENOUS
Status: DISCONTINUED | OUTPATIENT
Start: 2024-01-04 | End: 2024-01-04

## 2024-01-04 RX ORDER — PROPOFOL 10 MG/ML
VIAL (ML) INTRAVENOUS
Status: DISCONTINUED | OUTPATIENT
Start: 2024-01-04 | End: 2024-01-04

## 2024-01-04 RX ORDER — SODIUM CHLORIDE 9 MG/ML
INJECTION, SOLUTION INTRAVENOUS CONTINUOUS
Status: DISCONTINUED | OUTPATIENT
Start: 2024-01-04 | End: 2024-01-04 | Stop reason: HOSPADM

## 2024-01-04 RX ORDER — PROPOFOL 10 MG/ML
VIAL (ML) INTRAVENOUS CONTINUOUS PRN
Status: DISCONTINUED | OUTPATIENT
Start: 2024-01-04 | End: 2024-01-04

## 2024-01-04 RX ADMIN — SODIUM CHLORIDE: 0.9 INJECTION, SOLUTION INTRAVENOUS at 11:01

## 2024-01-04 RX ADMIN — PROPOFOL 60 MG: 10 INJECTION, EMULSION INTRAVENOUS at 11:01

## 2024-01-04 RX ADMIN — LIDOCAINE HYDROCHLORIDE 40 MG: 20 INJECTION INTRAVENOUS at 11:01

## 2024-01-04 RX ADMIN — PROPOFOL 150 MCG/KG/MIN: 10 INJECTION, EMULSION INTRAVENOUS at 11:01

## 2024-01-04 RX ADMIN — GLYCOPYRROLATE 0.2 MG: 0.2 INJECTION, SOLUTION INTRAMUSCULAR; INTRAVENOUS at 11:01

## 2024-01-04 NOTE — [employer] PATIENT INSTRUCTIONS
Discharge Summary/Instructions after an Endoscopic Procedure  Patient Name: Zaheer Neal  Patient MRN: [id number]  Patient YOB: 1960 Thursday, January 4, 2024  Bob Hammond MD  Dear patient,  As a result of recent federal legislation (The Federal Cures Act), you may   receive lab or pathology results from your procedure in your [facility]   account before your physician is able to contact you. Your physician or   their representative will relay the results to you with their   recommendations at their soonest availability.  Thank you,  RESTRICTIONS:  During your procedure today, you received medications for sedation.  These   medications may affect your judgment, balance and coordination.  Therefore,   for 24 hours, you have the following restrictions:   - DO NOT drive a car, operate machinery, make legal/financial decisions,   sign important papers or drink alcohol.    ACTIVITY:  Today: no heavy lifting, straining or running due to procedural   sedation/anesthesia.  The following day: return to full activity including work.  DIET:  Eat and drink normally unless instructed otherwise.     TREATMENT FOR COMMON SIDE EFFECTS:  - Mild abdominal pain, nausea, belching, bloating or excessive gas:  rest,   eat lightly and use a heating pad.  - Sore Throat: treat with throat lozenges and/or gargle with warm salt   water.  - Because air was used during the procedure, expelling large amounts of air   from your rectum or belching is normal.  - If a bowel prep was taken, you may not have a bowel movement for 1-3 days.    This is normal.  SYMPTOMS TO WATCH FOR AND REPORT TO YOUR PHYSICIAN:  1. Abdominal pain or bloating, other than gas cramps.  2. Chest pain.  3. Back pain.  4. Signs of infection such as: chills or fever occurring within 24 hours   after the procedure.  5. Rectal bleeding, which would show as bright red, maroon, or black stools.   (A tablespoon of blood from the rectum is not serious, especially  if   hemorrhoids are present.)  6. Vomiting.  7. Weakness or dizziness.  GO DIRECTLY TO THE NEAREST EMERGENCY ROOM IF YOU HAVE ANY OF THE FOLLOWING:      Difficulty breathing              Chills and/or fever over 101 F   Persistent vomiting and/or vomiting blood   Severe abdominal pain   Severe chest pain   Black, tarry stools   Bleeding- more than one tablespoon   Any other symptom or condition that you feel may need urgent attention  Your doctor recommends these additional instructions:  If any biopsies were taken, your doctors clinic will contact you in 1 to 2   weeks with any results.  - Patient has a contact number available for emergencies.  The signs and   symptoms of potential delayed complications were discussed with the   patient.  Return to normal activities tomorrow.  Written discharge   instructions were provided to the patient.   - Discharge patient to home.   - Resume previous diet.   - Continue present medications.   - Await pathology results.   - Repeat colonoscopy in 5 years for surveillance.   For questions, problems or results please call your physician - Bob Hammond MD at Work:  [phone].  [facility], EMERGENCY ROOM PHONE NUMBER: [phone]  IF A COMPLICATION OR EMERGENCY SITUATION ARISES AND YOU ARE UNABLE TO REACH   YOUR PHYSICIAN - GO DIRECTLY TO THE EMERGENCY ROOM.  Bob Hammond MD  1/4/2024 12:04:03 PM  This report has been verified and signed electronically.  Dear patient,  As a result of recent federal legislation (The Federal Cures Act), you may   receive lab or pathology results from your procedure in your [facility]   account before your physician is able to contact you. Your physician or   their representative will relay the results to you with their   recommendations at their soonest availability.  Thank you,  PROVATION

## 2024-01-04 NOTE — ANESTHESIA POSTPROCEDURE EVALUATION
Anesthesia Post Evaluation    Patient: Zaheer Neal    Procedure(s) Performed: Procedure(s) (LRB):  COLONOSCOPY (N/A)    Final Anesthesia Type: general      Patient location during evaluation: PACU  Patient participation: Yes- Able to Participate  Level of consciousness: awake and alert  Post-procedure vital signs: reviewed and stable  Pain management: adequate  Airway patency: patent    PONV status at discharge: No PONV  Anesthetic complications: no      Cardiovascular status: stable  Respiratory status: spontaneous ventilation  Hydration status: euvolemic  Follow-up not needed.              Vitals Value Taken Time   /73 01/04/24 1230   Temp 36.8 °C (98.2 °F) 01/04/24 1203   Pulse 76 01/04/24 1230   Resp 18 01/04/24 1230   SpO2 100 % 01/04/24 1230         No case tracking events are documented in the log.      Pain/Buffy Score: Buffy Score: 10 (1/4/2024 12:30 PM)

## 2024-01-04 NOTE — PLAN OF CARE
Pt arrived to recovery bay 11. VSS, airway intact, O2 stable on room air, and talking. RN received report on pt from OR nurse and anesthesia.

## 2024-01-04 NOTE — PLAN OF CARE
Discharge instructions given and explained to patient and family with verbalization of understanding all instructions.  Patients v/s stable, denies n/v and tolerating po, rates pain level tolerable, IV removed, and family notified  of pts status, patient ready for discharge home.

## 2024-01-04 NOTE — H&P
Short Stay Endoscopy History and Physical    PCP - Silvino Zuñiga MD    Procedure - Colonoscopy  Sedation: GA  ASA - per anesthesia  Mallampati - per anesthesia  History of Anesthesia problems - no  Family history Anesthesia problems -  no     HPI:  This is a 63 y.o. male here for evaluation of : History of colon polyps    Reflux - no  Dysphagia - no  Abdominal pain - no  Diarrhea - no    ROS:  Constitutional: No fevers, chills, No weight loss  ENT: No allergies  CV: No chest pain  Pulm: No cough, No shortness of breath  Ophtho: No vision changes  GI: see HPI  Medical History:  has a past medical history of Chronic urticaria (11/12/2014), Colonic polyp, Malignant neoplasm of thyroid gland, Obesity, Post-surgical hypothyroidism, PUD (peptic ulcer disease), and Urticaria.    Surgical History:  has a past surgical history that includes Knee surgery (8/2013); Thyroidectomy; and Colonoscopy (N/A, 2/5/2018).    Family History: family history includes Breast cancer in his mother and sister; Cancer in his mother and sister; Heart disease (age of onset: 64) in his father.. Otherwise no colon cancer, inflammatory bowel disease, or GI malignancies.    Social History:  reports that he has never smoked. He has never used smokeless tobacco. He reports current alcohol use. He reports that he does not use drugs.    Review of patient's allergies indicates:   Allergen Reactions    Shellfish containing products Hives       Medications:   Medications Prior to Admission   Medication Sig Dispense Refill Last Dose    cephALEXin (KEFLEX) 250 MG capsule Take 500 mg by mouth 2 (two) times daily.       cetirizine (ZYRTEC) 10 MG tablet Take 10 mg by mouth.       diclofenac sodium (VOLTAREN) 1 % Gel Apply 2 g topically 4 (four) times daily. To areas of foot pain (Patient not taking: Reported on 10/26/2023) 100 g 3     EPINEPHrine (EPIPEN) 0.3 mg/0.3 mL AtIn Inject 0.3 mLs (0.3 mg total) into the muscle once. for 1 dose 1 each 0      famotidine (PEPCID) 20 MG tablet Take 1 tablet (20 mg total) by mouth 2 (two) times daily. (Patient not taking: Reported on 10/26/2023) 60 tablet 1     fluticasone propionate (FLONASE) 50 mcg/actuation nasal spray 1 spray (50 mcg total) by Each Nostril route 2 (two) times a day. (Patient not taking: Reported on 10/26/2023) 16 g 0     guaiFENesin-codeine 100-10 mg/5 ml (TUSSI-ORGANIDIN NR)  mg/5 mL syrup Take by mouth.       hydrOXYzine HCL (ATARAX) 25 MG tablet 1 to 8 tablets at bedtime.  Start with 3 tablets.  Increase or decrease as needed until itching is controlled or a maximum of 8 tablets. (Patient not taking: Reported on 10/26/2023) 240 tablet 3     Lactobacillus rhamnosus GG (CULTURELLE) 10 billion cell capsule Take 1 capsule by mouth once daily.       POLY HIST FORTE 10.5-10 mg Tab Take 1 tablet by mouth every 8 (eight) hours as needed.       predniSONE (DELTASONE) 10 MG tablet Take 40 mg by mouth.       rosuvastatin (CRESTOR) 20 MG tablet Take 1 tablet (20 mg total) by mouth once daily. 90 tablet 1     sildenafiL (VIAGRA) 50 MG tablet Take 1 tablet (50 mg total) by mouth daily as needed for Erectile Dysfunction. (Patient not taking: Reported on 10/26/2023) 30 tablet 2     tamsulosin (FLOMAX) 0.4 mg Cap Take 1 capsule (0.4 mg total) by mouth once daily. 90 capsule 3     TIROSINT 150 mcg Cap Take 150 mcg by mouth once daily. (Patient not taking: Reported on 10/26/2023) 90 capsule 3        Objective Findings:    Vital Signs: Per nursing notes.    Physical Exam:  General Appearance: Well appearing in no acute distress  Head:   Normocephalic, without obvious abnormality  Eyes:    No scleral icterus  Airway: Open  Neck: No restriction in mobility  Lungs: CTA bilaterally in anterior and posterior fields, no wheezes, no crackles.  Heart:  Regular rate and rhythm, S1, S2 normal, no murmurs heard  Abdomen: Soft, non tender, non distended      Labs:  Lab Results   Component Value Date    WBC 4.06 03/01/2023     HGB 12.8 (L) 03/01/2023    HCT 40.3 03/01/2023     03/01/2023    CHOL 87 (L) 09/28/2023    TRIG 66 09/28/2023    HDL 32 (L) 09/28/2023    ALT 37 09/28/2023    AST 42 09/28/2023     05/15/2023    K 4.6 05/15/2023     05/15/2023    CREATININE 1.1 05/15/2023    BUN 15 05/15/2023    CO2 30 (H) 05/15/2023    TSH 0.364 (L) 07/12/2023    PSA 1.5 05/15/2023    INR 1.0 09/18/2011    HGBA1C 5.4 07/12/2023         I have explained the risks and benefits of endoscopy procedures to the patient including but not limited to bleeding, perforation, infection, and death.    Thank you so much for allowing me to participate in the care of Zaheer Hammond MD

## 2024-01-04 NOTE — TRANSFER OF CARE
"Anesthesia Transfer of Care Note    Patient: Zaheer Neal    Procedure(s) Performed: Procedure(s) (LRB):  COLONOSCOPY (N/A)    Patient location: PACU    Anesthesia Type: general    Transport from OR: Transported from OR on room air with adequate spontaneous ventilation    Post pain: adequate analgesia    Post assessment: no apparent anesthetic complications and tolerated procedure well    Post vital signs: stable    Level of consciousness: awake    Nausea/Vomiting: no nausea/vomiting    Complications: none    Transfer of care protocol was followed      Last vitals: Visit Vitals  BP (!) 170/81 (BP Location: Left arm, Patient Position: Lying)   Pulse 82   Temp 36.1 °C (97 °F) (Temporal)   Resp 20   Ht 6' 6" (1.981 m)   Wt 131.5 kg (290 lb)   SpO2 99%   BMI 33.51 kg/m²     "

## 2024-01-08 LAB
FINAL PATHOLOGIC DIAGNOSIS: NORMAL
GROSS: NORMAL
Lab: NORMAL

## 2024-01-09 ENCOUNTER — TELEPHONE (OUTPATIENT)
Dept: GASTROENTEROLOGY | Facility: CLINIC | Age: 64
End: 2024-01-09
Payer: COMMERCIAL

## 2024-01-09 NOTE — TELEPHONE ENCOUNTER
----- Message from Bob Hammond MD sent at 1/8/2024  2:00 PM CST -----  Please call and notify patient, the colon polyp was benign.

## 2024-02-16 ENCOUNTER — LAB VISIT (OUTPATIENT)
Dept: LAB | Facility: HOSPITAL | Age: 64
End: 2024-02-16
Attending: FAMILY MEDICINE
Payer: COMMERCIAL

## 2024-02-16 DIAGNOSIS — Z00.00 ANNUAL PHYSICAL EXAM: ICD-10-CM

## 2024-02-16 DIAGNOSIS — Z12.5 PROSTATE CANCER SCREENING: ICD-10-CM

## 2024-02-16 LAB — COMPLEXED PSA SERPL-MCNC: 0.76 NG/ML (ref 0–4)

## 2024-02-16 PROCEDURE — 36415 COLL VENOUS BLD VENIPUNCTURE: CPT | Mod: PN | Performed by: FAMILY MEDICINE

## 2024-02-16 PROCEDURE — 84153 ASSAY OF PSA TOTAL: CPT | Performed by: FAMILY MEDICINE

## 2024-02-21 ENCOUNTER — OFFICE VISIT (OUTPATIENT)
Dept: UROLOGY | Facility: CLINIC | Age: 64
End: 2024-02-21
Payer: COMMERCIAL

## 2024-02-21 VITALS — HEIGHT: 78 IN | WEIGHT: 290 LBS | BODY MASS INDEX: 33.55 KG/M2

## 2024-02-21 DIAGNOSIS — N13.8 BPH WITH URINARY OBSTRUCTION: Primary | ICD-10-CM

## 2024-02-21 DIAGNOSIS — N40.1 BPH WITH URINARY OBSTRUCTION: Primary | ICD-10-CM

## 2024-02-21 PROCEDURE — 99213 OFFICE O/P EST LOW 20 MIN: CPT | Mod: S$GLB,,, | Performed by: UROLOGY

## 2024-02-21 PROCEDURE — 99999 PR PBB SHADOW E&M-EST. PATIENT-LVL III: CPT | Mod: PBBFAC,,, | Performed by: UROLOGY

## 2024-02-21 PROCEDURE — 1159F MED LIST DOCD IN RCRD: CPT | Mod: CPTII,S$GLB,, | Performed by: UROLOGY

## 2024-02-21 PROCEDURE — 3008F BODY MASS INDEX DOCD: CPT | Mod: CPTII,S$GLB,, | Performed by: UROLOGY

## 2024-02-21 RX ORDER — SILDENAFIL 100 MG/1
100 TABLET, FILM COATED ORAL DAILY PRN
Qty: 30 TABLET | Refills: 11 | Status: SHIPPED | OUTPATIENT
Start: 2024-02-21 | End: 2025-02-20

## 2024-02-21 RX ORDER — TAMSULOSIN HYDROCHLORIDE 0.4 MG/1
0.4 CAPSULE ORAL DAILY
Qty: 90 CAPSULE | Refills: 3 | Status: SHIPPED | OUTPATIENT
Start: 2024-02-21 | End: 2024-03-12

## 2024-02-21 NOTE — PROGRESS NOTES
Subjective:       Patient ID: Zaheer Neal is a 63 y.o. male.    Chief Complaint: Annual Exam (Needs refill on flomax)    HPI patient is here for prostate check and refill on Viagra and he would like some Flomax refilled also.  He gets up 2-3 times at night but he drinks a lot of fluids in the evening when he goes home told him the cut out caffeinated products and decrease the volume that he is drinking in the evening    Past Medical History:   Diagnosis Date    Chronic urticaria 11/12/2014    Colonic polyp     Malignant neoplasm of thyroid gland     thyroid    Obesity     Post-surgical hypothyroidism     PUD (peptic ulcer disease)     Urticaria        Past Surgical History:   Procedure Laterality Date    COLONOSCOPY N/A 2/5/2018    Procedure: COLONOSCOPY;  Surgeon: ABHISHEK Hitchcock MD;  Location: [address]);  Service: Endoscopy;  Laterality: N/A;  confirmed appt.    COLONOSCOPY N/A 1/4/2024    Procedure: COLONOSCOPY;  Surgeon: Bob Hammond MD;  [facility];  Service: Endoscopy;  Laterality: N/A;  Referred by: Dr. Hitchcock (f/u from c-scope on 2/5/2018) / Prep: Suprep / Route instructions sent: portal.   12/28- pre call complete. DB    KNEE SURGERY  8/2013    right knee-includes scope    THYROIDECTOMY         Family History   Problem Relation Age of Onset    Breast cancer Mother     Cancer Mother         breast    Breast cancer Sister     Cancer Sister         breast    Heart disease Father 64        MI    Diabetes Neg Hx     Thyroid cancer Neg Hx        Social History     Socioeconomic History    Marital status:      Spouse name: Aubrie    Number of children: 2   Tobacco Use    Smoking status: Never    Smokeless tobacco: Never   Substance and Sexual Activity    Alcohol use: Yes     Comment: couple of times per month    Drug use: No    Sexual activity: Yes     Partners: Female   Social History Narrative    RM x 3, 2 kids,  [employer], played Spus, [place], [place],  etc. [employer] for college. Born in [facility]     Social Determinants of Health     Financial Resource Strain: Low Risk  (2/20/2024)    Overall Financial Resource Strain (CARDIA)     Difficulty of Paying Living Expenses: Not hard at all   Food Insecurity: No Food Insecurity (2/20/2024)    Hunger Vital Sign     Worried About Running Out of Food in the Last Year: Never true     Ran Out of Food in the Last Year: Never true   Transportation Needs: No Transportation Needs (2/20/2024)    PRAPARE - Transportation     Lack of Transportation (Medical): No     Lack of Transportation (Non-Medical): No   Physical Activity: Insufficiently Active (2/20/2024)    Exercise Vital Sign     Days of Exercise per Week: 1 day     Minutes of Exercise per Session: 30 min   Stress: No Stress Concern Present (2/20/2024)    [employer] [place] [employer] - Occupational Stress Questionnaire     Feeling of Stress : Not at all   Social Connections: Unknown (2/20/2024)    Social Connection and Isolation Panel [NHANES]     Frequency of Communication with Friends and Family: Three times a week     Frequency of Social Gatherings with Friends and Family: Twice a week     Active Member of Clubs or Organizations: No     Attends Club or Organization Meetings: Never     Marital Status:    Housing Stability: Low Risk  (2/20/2024)    Housing Stability Vital Sign     Unable to Pay for Housing in the Last Year: No     Number of Places Lived in the Last Year: 1     Unstable Housing in the Last Year: No       Allergies:  Shellfish containing products    Medications:    Current Outpatient Medications:     fluticasone propionate (FLONASE) 50 mcg/actuation nasal spray, 1 spray (50 mcg total) by Each Nostril route 2 (two) times a day., Disp: 16 g, Rfl: 0    Lactobacillus rhamnosus GG (CULTURELLE) 10 billion cell capsule, Take 1 capsule by mouth once daily., Disp: , Rfl:     rosuvastatin (CRESTOR) 20 MG tablet, Take 1 tablet (20 mg total) by  mouth once daily., Disp: 90 tablet, Rfl: 1    tamsulosin (FLOMAX) 0.4 mg Cap, Take 1 capsule (0.4 mg total) by mouth once daily., Disp: 90 capsule, Rfl: 3    TIROSINT 150 mcg Cap, Take 150 mcg by mouth once daily., Disp: 90 capsule, Rfl: 3    cephALEXin (KEFLEX) 250 MG capsule, Take 500 mg by mouth 2 (two) times daily., Disp: , Rfl:     cetirizine (ZYRTEC) 10 MG tablet, Take 10 mg by mouth., Disp: , Rfl:     EPINEPHrine (EPIPEN) 0.3 mg/0.3 mL AtIn, Inject 0.3 mLs (0.3 mg total) into the muscle once. for 1 dose, Disp: 1 each, Rfl: 0    guaiFENesin-codeine 100-10 mg/5 ml (TUSSI-ORGANIDIN NR)  mg/5 mL syrup, Take by mouth., Disp: , Rfl:     POLY HIST FORTE 10.5-10 mg Tab, Take 1 tablet by mouth every 8 (eight) hours as needed., Disp: , Rfl:     predniSONE (DELTASONE) 10 MG tablet, Take 40 mg by mouth., Disp: , Rfl:     sildenafiL (VIAGRA) 100 MG tablet, Take 1 tablet (100 mg total) by mouth daily as needed., Disp: 30 tablet, Rfl: 11    tamsulosin (FLOMAX) 0.4 mg Cap, Take 1 capsule (0.4 mg total) by mouth once daily., Disp: 90 capsule, Rfl: 3    Review of Systems   Constitutional:  Negative for activity change, appetite change, chills, diaphoresis, fatigue, fever and unexpected weight change.   HENT:  Negative for congestion, dental problem, hearing loss, mouth sores, postnasal drip, rhinorrhea, sinus pressure and trouble swallowing.    Eyes:  Negative for pain, discharge and itching.   Respiratory:  Negative for apnea, cough, choking, chest tightness, shortness of breath and wheezing.    Cardiovascular:  Negative for chest pain, palpitations and leg swelling.   Gastrointestinal:  Negative for abdominal distention, abdominal pain, anal bleeding, blood in stool, constipation, diarrhea, nausea, rectal pain and vomiting.   Endocrine: Negative for polydipsia and polyuria.   Genitourinary:  Negative for decreased urine volume, difficulty urinating, dysuria, enuresis, flank pain, frequency, genital sores, hematuria,  penile discharge, penile pain, penile swelling, scrotal swelling, testicular pain and urgency.   Musculoskeletal:  Negative for arthralgias, back pain and myalgias.   Skin:  Negative for color change, rash and wound.   Neurological:  Negative for dizziness, syncope, speech difficulty, light-headedness and headaches.   Hematological:  Negative for adenopathy. Does not bruise/bleed easily.   Psychiatric/Behavioral:  Negative for behavioral problems, confusion, hallucinations and sleep disturbance.        Objective:      Physical Exam  Constitutional:       Appearance: He is well-developed.   HENT:      Head: Normocephalic.   Cardiovascular:      Rate and Rhythm: Normal rate.   Pulmonary:      Effort: Pulmonary effort is normal.   Abdominal:      Palpations: Abdomen is soft.   Genitourinary:     Prostate: Normal.      Comments: 35 g prostate benign PSA is normal and less than 1  Skin:     General: Skin is warm.   Neurological:      Mental Status: He is alert.         Assessment:       1. BPH with urinary obstruction        Plan:       Zaheer was seen today for annual exam.    Diagnoses and all orders for this visit:    BPH with urinary obstruction  -     Prostate Specific Antigen, Diagnostic; Future    Other orders  -     tamsulosin (FLOMAX) 0.4 mg Cap; Take 1 capsule (0.4 mg total) by mouth once daily.  -     sildenafiL (VIAGRA) 100 MG tablet; Take 1 tablet (100 mg total) by mouth daily as needed.        Return to clinic 1 year with PSA

## 2024-03-05 ENCOUNTER — LAB VISIT (OUTPATIENT)
Dept: LAB | Facility: HOSPITAL | Age: 64
End: 2024-03-05
Attending: NURSE PRACTITIONER
Payer: COMMERCIAL

## 2024-03-05 DIAGNOSIS — R74.8 ELEVATED LIVER ENZYMES: ICD-10-CM

## 2024-03-05 LAB
A1AT SERPL-MCNC: 132 MG/DL (ref 100–190)
ALBUMIN SERPL BCP-MCNC: 4.1 G/DL (ref 3.5–5.2)
ALP SERPL-CCNC: 71 U/L (ref 38–126)
ALT SERPL W/O P-5'-P-CCNC: 46 U/L (ref 10–44)
AST SERPL-CCNC: 39 U/L (ref 15–46)
BILIRUB SERPL-MCNC: 0.6 MG/DL (ref 0.1–1)
CERULOPLASMIN SERPL-MCNC: 28 MG/DL (ref 15–45)
CK SERPL-CCNC: 219 U/L (ref 55–170)
IGG SERPL-MCNC: 1116 MG/DL (ref 650–1600)
IRON SERPL-MCNC: 74 UG/DL (ref 45–160)
PROT SERPL-MCNC: 7.3 G/DL (ref 6–8.4)
SATURATED IRON: 19 % (ref 20–50)
TOTAL IRON BINDING CAPACITY: 395 UG/DL (ref 250–450)
TRANSFERRIN SERPL-MCNC: 267 MG/DL (ref 200–375)

## 2024-03-05 PROCEDURE — 82390 ASSAY OF CERULOPLASMIN: CPT | Mod: PN | Performed by: NURSE PRACTITIONER

## 2024-03-05 PROCEDURE — 36415 COLL VENOUS BLD VENIPUNCTURE: CPT | Mod: PN | Performed by: NURSE PRACTITIONER

## 2024-03-05 PROCEDURE — 86039 ANTINUCLEAR ANTIBODIES (ANA): CPT | Mod: PN | Performed by: NURSE PRACTITIONER

## 2024-03-05 PROCEDURE — 80076 HEPATIC FUNCTION PANEL: CPT | Mod: PN | Performed by: NURSE PRACTITIONER

## 2024-03-05 PROCEDURE — 86235 NUCLEAR ANTIGEN ANTIBODY: CPT | Mod: 59,PN | Performed by: NURSE PRACTITIONER

## 2024-03-05 PROCEDURE — 82550 ASSAY OF CK (CPK): CPT | Mod: PN | Performed by: NURSE PRACTITIONER

## 2024-03-05 PROCEDURE — 83540 ASSAY OF IRON: CPT | Mod: PN | Performed by: NURSE PRACTITIONER

## 2024-03-05 PROCEDURE — 82103 ALPHA-1-ANTITRYPSIN TOTAL: CPT | Mod: PN | Performed by: NURSE PRACTITIONER

## 2024-03-05 PROCEDURE — 82784 ASSAY IGA/IGD/IGG/IGM EACH: CPT | Mod: PN | Performed by: NURSE PRACTITIONER

## 2024-03-05 PROCEDURE — 86015 ACTIN ANTIBODY EACH: CPT | Mod: PN | Performed by: NURSE PRACTITIONER

## 2024-03-05 PROCEDURE — 86381 MITOCHONDRIAL ANTIBODY EACH: CPT | Mod: PN | Performed by: NURSE PRACTITIONER

## 2024-03-05 PROCEDURE — 86038 ANTINUCLEAR ANTIBODIES: CPT | Mod: PN | Performed by: NURSE PRACTITIONER

## 2024-03-06 LAB
ANA PATTERN 1: NORMAL
ANA SER QL IF: POSITIVE
ANA TITR SER IF: NORMAL {TITER}

## 2024-03-07 LAB
MITOCHONDRIA AB TITR SER IF: NORMAL {TITER}
SMOOTH MUSCLE AB TITR SER IF: NORMAL {TITER}

## 2024-03-08 LAB
ANTI SM ANTIBODY: 0.07 RATIO (ref 0–0.99)
ANTI SM/RNP ANTIBODY: 0.11 RATIO (ref 0–0.99)
ANTI-SM INTERPRETATION: NEGATIVE
ANTI-SM/RNP INTERPRETATION: NEGATIVE
ANTI-SSA ANTIBODY: 0.06 RATIO (ref 0–0.99)
ANTI-SSA INTERPRETATION: NEGATIVE
ANTI-SSB ANTIBODY: 0.05 RATIO (ref 0–0.99)
ANTI-SSB INTERPRETATION: NEGATIVE
DSDNA AB SER-ACNC: NORMAL [IU]/ML

## 2024-03-12 ENCOUNTER — PROCEDURE VISIT (OUTPATIENT)
Dept: HEPATOLOGY | Facility: CLINIC | Age: 64
End: 2024-03-12
Payer: COMMERCIAL

## 2024-03-12 ENCOUNTER — OFFICE VISIT (OUTPATIENT)
Dept: HEPATOLOGY | Facility: CLINIC | Age: 64
End: 2024-03-12
Payer: COMMERCIAL

## 2024-03-12 VITALS — HEIGHT: 78 IN | BODY MASS INDEX: 33.67 KG/M2 | WEIGHT: 291 LBS

## 2024-03-12 DIAGNOSIS — R74.8 ELEVATED LIVER ENZYMES: Primary | ICD-10-CM

## 2024-03-12 DIAGNOSIS — R76.8 POSITIVE ANA (ANTINUCLEAR ANTIBODY): ICD-10-CM

## 2024-03-12 DIAGNOSIS — R74.8 ELEVATED LIVER ENZYMES: ICD-10-CM

## 2024-03-12 DIAGNOSIS — E78.5 HYPERLIPIDEMIA, UNSPECIFIED HYPERLIPIDEMIA TYPE: ICD-10-CM

## 2024-03-12 DIAGNOSIS — Z23 NEED FOR HEPATITIS B VACCINATION: ICD-10-CM

## 2024-03-12 DIAGNOSIS — E66.09 CLASS 1 OBESITY DUE TO EXCESS CALORIES WITHOUT SERIOUS COMORBIDITY WITH BODY MASS INDEX (BMI) OF 33.0 TO 33.9 IN ADULT: ICD-10-CM

## 2024-03-12 DIAGNOSIS — K76.0 FATTY LIVER: ICD-10-CM

## 2024-03-12 DIAGNOSIS — K74.00 LIVER FIBROSIS: ICD-10-CM

## 2024-03-12 PROBLEM — E66.811 OBESITY (BMI 30.0-34.9): Status: RESOLVED | Noted: 2017-08-29 | Resolved: 2024-03-12

## 2024-03-12 PROBLEM — E66.9 OBESITY (BMI 30.0-34.9): Status: RESOLVED | Noted: 2017-08-29 | Resolved: 2024-03-12

## 2024-03-12 PROCEDURE — 91200 LIVER ELASTOGRAPHY: CPT | Mod: S$GLB,,, | Performed by: NURSE PRACTITIONER

## 2024-03-12 PROCEDURE — 1160F RVW MEDS BY RX/DR IN RCRD: CPT | Mod: CPTII,S$GLB,, | Performed by: NURSE PRACTITIONER

## 2024-03-12 PROCEDURE — 99999 PR PBB SHADOW E&M-EST. PATIENT-LVL III: CPT | Mod: PBBFAC,,, | Performed by: NURSE PRACTITIONER

## 2024-03-12 PROCEDURE — 3008F BODY MASS INDEX DOCD: CPT | Mod: CPTII,S$GLB,, | Performed by: NURSE PRACTITIONER

## 2024-03-12 PROCEDURE — 99214 OFFICE O/P EST MOD 30 MIN: CPT | Mod: S$GLB,,, | Performed by: NURSE PRACTITIONER

## 2024-03-12 PROCEDURE — 1159F MED LIST DOCD IN RCRD: CPT | Mod: CPTII,S$GLB,, | Performed by: NURSE PRACTITIONER

## 2024-03-12 RX ORDER — HEPATITIS B VACCINE (RECOMBINANT) ADJUVANTED 20 UG/.5ML
0.5 INJECTION, SOLUTION INTRAMUSCULAR ONCE
Qty: 0.5 ML | Refills: 1 | Status: SHIPPED | OUTPATIENT
Start: 2024-03-12 | End: 2024-03-12

## 2024-03-12 NOTE — PROCEDURES
FibroScan Cook (Vibration Controlled Transient Elastography)    Date/Time: 3/12/2024 8:45 AM    Performed by: Tabby Spence NP  Authorized by: Tabby Spence, SERGIO    Diagnosis:  NAFLD    Probe:  XL    Universal Protocol: Patient's identity, procedure and site were verified, confirmatory pause was performed.  Discussed procedure including risks and potential complications.  Questions answered.  Patient verbalizes understanding and wishes to proceed with VCTE.     Procedure: After providing explanations of the procedure, patient was placed in the supine position with right arm in maximum abduction to allow optimal exposure of right lateral abdomen.  Patient was briefly assessed, Testing was performed in the mid-axillary location, 50Hz Shear Wave pulses were applied and the resulting Shear Wave and Propagation Speed detected with a 3.5 MHz ultrasonic signal, using the FibroScan probe, Skin to liver capsule distance and liver parenchyma were accessed during the entire examination with the FibroScan probe, Patient was instructed to breathe normally and to abstain from sudden movements during the procedure, allowing for random measurements of liver stiffness. At least 10 Shear Waves were produced, Individual measurements of each Shear Wave were calculated.  Patient tolerated the procedure well with no complications.  Meets discharge criteria as was dismissed.  Rates pain 0 out of 10.  Patient will follow up with ordering provider to review results.    Findings  Median liver stiffness score:  7.7  CAP Reading: dB/m:  255    IQR/med %:  16  Interpretation  Fibrosis interpretation is based on medial liver stiffness - Kilopascal (kPa).    Fibrosis Stage:  F2  Steatosis interpretation is based on controlled attenuation parameter - (dB/m).    Steatosis Grade:  S2

## 2024-03-12 NOTE — PROGRESS NOTES
[facility] VISIT FOLLOW UP NOTE    PCP: Silvino Zuñiga MD     CHIEF COMPLAINT: elevated liver enzymes, fatty liver (?), liver fibrosis (?), positive KENNY    HPI: This is a 63 y.o. male with PMH noted below, presenting for follow up of above    Serological workup was negative for Josué's, alpha-1 antitrypsin deficiency, hemochromatosis,  and viral hepatitis.   + KENNY 1:320, negative AMA, ASMA and IgG  Mildly elevated CK (276) but repeat near normal     Prior serologic workup:   Lab Results   Component Value Date    SMOOTHMUSCAB Negative 1:40 03/05/2024    AMAIFA Negative 1:40 03/05/2024    IGGSERUM 1116 03/05/2024    ANASCREEN Positive (A) 03/05/2024    FESATURATED 19 (L) 03/05/2024    CERULOPLSM 28.0 03/05/2024    HEPBSAG Non-reactive 09/06/2023    HEPCAB Non-reactive 09/06/2023    HEPAIGM Non-reactive 09/06/2023       Liver fibrosis staging:  -- fibroscan 3/2024 noted F2, S2 (kPA 7.7, )    Risk factors for NAFLD include obesity, HLD    Interval HPI: Presents today alone. US normal, no fatty liver noted but fibroscan suggests ~30% fat in the liver   Labs trending down but remain mildly elevated   CK near normal   Started statin 5/2023 but labs were already elevated 5/2023 before starting   +KENNY, no known autoimmune disease  Working on diet/weight loss, lost ~10 lbs, current wt 290   Fibroscan suggests F2 fibrosis so needs further investigation     Labs done 3/2024 show elevated transaminase levels (ALT > AST, elevated May-Sept, otherwise normal before then)  Platelets WNL, alk phos WNL  Synthetic liver functioning WNL    Lab Results   Component Value Date    ALT 46 (H) 03/05/2024    AST 39 03/05/2024    ALKPHOS 71 03/05/2024    BILITOT 0.6 03/05/2024    ALBUMIN 4.1 03/05/2024    INR 1.0 09/18/2011     03/01/2023       Abd U/S done 8/2023 showed normal liver     Denies family history of liver disease . Denies significant alcohol consumption currently or in the past-   Social History  "    Substance and Sexual Activity   Alcohol Use Yes    Comment: couple of times per month         +Immunity to Hep A  - needs Hep B vaccine, sent to [facility] pharm and iD        Allergy and medication list reviewed and updated     PMHX:  has a past medical history of Chronic urticaria (11/12/2014), Colonic polyp, Malignant neoplasm of thyroid gland, Obesity, Post-surgical hypothyroidism, PUD (peptic ulcer disease), and Urticaria.    PSHX:  has a past surgical history that includes Knee surgery (8/2013); Thyroidectomy; Colonoscopy (N/A, 2/5/2018); and Colonoscopy (N/A, 1/4/2024).    FAMILY HISTORY: Updated and reviewed in [facility]    SOCIAL HISTORY:   Social History     Substance and Sexual Activity   Alcohol Use Yes    Comment: couple of times per month       Social History     Substance and Sexual Activity   Drug Use No       ROS:   GENERAL: Denies fatigue  CARDIOVASCULAR: Denies edema  GI: Denies abdominal pain  SKIN: Denies rash, itching   NEURO: Denies confusion, memory loss, or mood changes    PHYSICAL EXAM:   Friendly Black or  male, in no acute distress; alert and oriented to person, place and time  VITALS: Ht 6' 6" (1.981 m)   Wt 132 kg (291 lb 0.1 oz)   BMI 33.63 kg/m²   EYES: Sclerae anicteric  GI: Soft, non-tender, non-distended. No ascites.  EXTREMITIES:  No edema.  SKIN: Warm and dry. No jaundice. No telangectasias noted. No palmar erythema.  NEURO:  No asterixis.  PSYCH:  Thought and speech pattern appropriate. Behavior normal      EDUCATION:  See instructions discussed with patient in Instructions section of the After Visit Summary     ASSESSMENT & PLAN:  63 y.o. male with:  1. Elevated liver enzymes   -- trending down, only mildly elevated, which is not typical for AIH. Given positive KENNY, will need to consider biopsy if liver enzymes do not normalize or worsen, but currently will try to avoid given AIH unlikely (stiven with normalization of labs 9/2023)  --- synthetic liver function WNL  --- " Abd US noted normal liver  -- do not suspect any medications contributing, liver enzymes elevated before statin started and trending down  --- previous serological work up : see HPI  --- Hep A and B immunity: see HPI      2. Liver fibrosis (?)  F2 on fibroscan, will compare to MRI elasto. If MRI notes fibrosis, will need to consider a biopsy but otherwise can monitor     3. Fatty liver  Noted on fibroscan  - see HPI  -- Immunity to Hep A and B : see HPI  -- Recommendations discussed with patient:  Limit alcohol consumption, no more than 1-2 serving(s) of alcohol in any day (1 serving is 5 ounces of wine, 12 ounces of beer, or 1.5 ounces of liquor) and do NOT recommend any daily alcohol use    2 Weight loss goal of 10-15 lbs  3. Low carb/sugar, high fiber and protein diet, limit your carb intake to LESS than 30-45 grams of carbs with a meal or LESS than 5-10 grams with any snack   4. Exercise, 5 days per week, 30 minutes per day, as tolerated  5. Recommend good cholesterol, blood pressure, blood sugar levels     4. Obesity, HLD  -- Body mass index is 33.63 kg/m².   -- increases risk for fatty liver    5. + KENNY  Will await MRI results. If MRI also noted fibrosis, will need to do biopsy to assess for fibrosis, AIH (although AIH seems unlikely given lab trend)        Follow up for pending results of workup. Based on results of MRI  Orders Placed This Encounter   Procedures    MR Elastography    Hepatitis B (Recombinant) Adjuvanted, 2 dose        Thank you for allowing me to participate in the care of DRAKE Cabral    I spent a total of 30 minutes on the day of the visit.This includes face to face time and non-face to face time preparing to see the patient (eg, review of tests), obtaining and/or reviewing separately obtained history, documenting clinical information in the electronic or other health record, independently interpreting results and communicating results to the  patient/family/caregiver, and coordinating care.         CC'ed note to:   Silvino Zuñiga MD

## 2024-03-12 NOTE — PATIENT INSTRUCTIONS
1. Fibroscan to look for fat or scar tissue in the liver showed ~30% fat in the liver, possible scar tissue. Since the fibroscan is abnormal, I recommend an MRI to compare. If your MRI also shows scar tissue, we may have to do a biopsy   2. Recommend vaccines for Hepatitis  B, see below   3. Follow up based on MRI results     There is no FDA approved therapy for fatty liver disease. Therefore, these things are important:  Limit alcohol consumption, none given possible scar tissue   2 Weight loss goal of 25 lbs. [facility] offers benefits to patients so let me know if you want a referral to the [facility] gym. Also, [facility] has dieticians that can create a weight loss plan. If you are interested let me know and I can place a referral. If so, contact the dietician team at [facility] at email [email or web address] or call [phone] to get scheduled. They do offer video visits   3. Low carb/sugar and high protein diet (~1 g/kg/day of protein).Try to limit your carb intake to LESS than 30-45 grams of carbs with a meal or LESS than 5-10 grams with any snack (total of any snack foods eaten during that time). Use [employer] Trader Pal or Lose It sammy to add up your carbs through the day. Do NOT drink any beverages with calories or carbs (this can lead to high blood sugar and weight gain). The main thing to focus on is low calorie, high protein, low carb)  4. Exercise, 5 days per week, 30 minutes per day, as tolerated  5. Recommend good cholesterol, blood pressure, blood sugar levels     In some people, fatty liver can progress to steatohepatitis (inflamatory fatty liver) and possibly to cirrhosis, increasing the risk for liver cancer, liver failure, and death. Therefore, the lifestyle changes are very important to decrease this risk      HEP B VACCINE  Your labs show that you DO have immunity against Hep A (+ Hep A IgG), so no further vaccine needed for Hep A    Your immunity  markers show that you do NOT have immunity against Hepatitis B, so I recommend that you receive the Hepatitis B vaccine. This will protect your liver from the virus, which can make your liver very sick. The vaccine series is either 2 or 3 vaccines (whichever your insurance covers), either:  1. One now, one at 4 weeks (completed after this)  OR  2.  One now, one at 4 weeks and one 6 months after the 1st one.    I sent the vaccine to the [facility] pharmacy. I recommend calling them in a couple of days to see if the vaccine is covered by your insurance and arrange the vaccines if they are covered    If the vaccine is not covered at the pharmacy level, I sent an order that you can get the vaccine in the infectious disease department at [facility]. If that is the case, please call [phone]  to schedule your vaccine administration appointment in the infectious disease department.  If you need to proceed with vaccines with the infectious disease department, you can call to obtain a cost for these vaccines before you proceed, you can call the [facility] office at [phone] or [phone]

## 2024-03-13 ENCOUNTER — PATIENT MESSAGE (OUTPATIENT)
Dept: ENDOCRINOLOGY | Facility: CLINIC | Age: 64
End: 2024-03-13
Payer: COMMERCIAL

## 2024-03-13 DIAGNOSIS — E89.0 POST-SURGICAL HYPOTHYROIDISM: ICD-10-CM

## 2024-03-13 DIAGNOSIS — C73 MALIGNANT NEOPLASM OF THYROID GLAND: ICD-10-CM

## 2024-03-13 RX ORDER — LEVOTHYROXINE SODIUM 13 UG/1
150 CAPSULE ORAL DAILY
Qty: 90 CAPSULE | Refills: 0 | Status: SHIPPED | OUTPATIENT
Start: 2024-03-13 | End: 2025-03-13

## 2024-04-04 ENCOUNTER — TELEPHONE (OUTPATIENT)
Dept: HEPATOLOGY | Facility: CLINIC | Age: 64
End: 2024-04-04
Payer: COMMERCIAL

## 2024-04-04 DIAGNOSIS — R74.8 ELEVATED LIVER ENZYMES: Primary | ICD-10-CM

## 2024-04-04 DIAGNOSIS — K74.00 LIVER FIBROSIS: ICD-10-CM

## 2024-04-04 NOTE — TELEPHONE ENCOUNTER
Reached out to pt to discuss MRI needing to be canceled. Pt did not answer, left vm for pt to give the office a call back.  Canceled MRI appt, scheduled f/u appt in August.  Will send pt portal message to pt.

## 2024-04-04 NOTE — TELEPHONE ENCOUNTER
Please notify pt that I received a letter from his insurance [employer] that they will not complete his MRI so we can just repeat the fibroscan in a few months and see If there is any improvement     Therefore, I recommend cancelling his MRI and scheduling a follow up with me in August instead with labs 1 week before, fibroscan same day    THx!

## 2024-04-05 ENCOUNTER — TELEPHONE (OUTPATIENT)
Dept: HEPATOLOGY | Facility: CLINIC | Age: 64
End: 2024-04-05
Payer: COMMERCIAL

## 2024-04-05 NOTE — TELEPHONE ENCOUNTER
"Returned pts call. Discussed MRI not being approved by ins and needing to be canceled. Informed pt that his f/u can be in August. Pt vu and all questions answered     ----- Message from Marco A Bowden sent at 4/5/2024 10:44 AM CDT -----  Consult/Advisory      Name Of Caller: Self    Contact Preference?: [phone]    What is the nature of the call?: Returning call to Briana    Additional Notes:  "Thank you for all that you do for our patients"       "

## 2024-05-10 DIAGNOSIS — E78.5 HYPERLIPIDEMIA, UNSPECIFIED HYPERLIPIDEMIA TYPE: ICD-10-CM

## 2024-05-10 RX ORDER — ROSUVASTATIN CALCIUM 20 MG/1
20 TABLET, COATED ORAL DAILY
Qty: 90 TABLET | Refills: 0 | Status: SHIPPED | OUTPATIENT
Start: 2024-05-10

## 2024-05-10 NOTE — TELEPHONE ENCOUNTER
Care Due:                  Date            Visit Type   Department     Provider  --------------------------------------------------------------------------------                                EP -                              PRIMARY      [facility] INTERNAL  Last Visit: 05-      CARE ([facility])   MEDICINE       TRE NUNEZ  Next Visit: None Scheduled  None         None Found                                                            Last  Test          Frequency    Reason                     Performed    Due Date  --------------------------------------------------------------------------------    Office Visit  15 months..  rosuvastatin.............  05-   08-    [facility] Embedded Care Due Messages. Reference number: [id number].   5/10/2024 1:00:00 PM CDT

## 2024-05-10 NOTE — TELEPHONE ENCOUNTER
Refill Routing Note   Medication(s) are not appropriate for processing by [facility] for the following reason(s):        Due for refill >6 months ago:     [facility] action(s):  Defer   Requires appointment : Yes      Medication Therapy Plan:  annual with PCP      Appointments  past 12m or future 3m with PCP    Date Provider   Last Visit   5/15/2023 Silvino Zuñiga MD   Next Visit   Visit date not found Silvino Zuñiga MD   ED visits in past 90 days: 0        Note composed:2:38 PM 05/10/2024

## 2024-06-10 ENCOUNTER — PATIENT MESSAGE (OUTPATIENT)
Dept: INTERNAL MEDICINE | Facility: CLINIC | Age: 64
End: 2024-06-10
Payer: COMMERCIAL

## 2024-06-17 NOTE — PROGRESS NOTES
INTERNAL MEDICINE SAME DAY PRIMARY CARE VISIT NOTE    Subjective:     Chief Complaint: Urticaria       Patient ID: Zaheer Neal is a 62 y.o. male with hyperlipidemia and hypothyroidism, here today for focused primary care visit.    Today, patient with complaint of hives    Patient recently seen at [place] urgent care several times for care.  Symptoms started after patient had fish that was fried in grease that also fried shellfish.  Patient has known shellfish allergy which he avoids due to history of hives in the past.      Symptoms started very 20th.  He presented to urgent care on January 21st for hives where he received a steroid injection and prednisone prescription.  States symptoms originally improved but then returned.  On January 28th, he went back to urgent care where he received prescription for prednisone and Vistaril.  After taking prednisone course, symptoms returned again and he went to urgent care February 5th where he received another prednisone course.  Overall, patient feels his hives have improved.  He still has evidence of hives on bilateral hands, arms, chest, and buttocks.  He is currently taking Zyrtec daily and Benadryl as needed for itching.  No fevers or chills.  No recent changes in soaps, detergents, or other home products.  No other recent changes to food.  He denies the road itching, change in voice, difficulty swallowing, difficulty breathing, shortness a breath, or chest pain.    Past Medical History:  Past Medical History:   Diagnosis Date    Chronic urticaria 11/12/2014    Colonic polyp     Malignant neoplasm of thyroid gland     thyroid    Obesity     Post-surgical hypothyroidism     PUD (peptic ulcer disease)     Thyroid cancer     Thyroid cancer     Urticaria        Home Medications:  Prior to Admission medications    Medication Sig Start Date End Date Taking? Authorizing Provider   ascorbic acid, vitamin C, (VITAMIN C) 500 MG tablet Take 500 mg by mouth once daily.  Patient here at the clinic ststed that he did not know his appointment was cancelled. Informed patient that I left a vM on his phone explaining to him about the cancellation and the reason for it. Rescheduled patient for another appointment.      Yes Historical Provider   calcium carbonate (OS-BISMARK) 600 mg calcium (1,500 mg) Tab Take 600 mg by mouth once.   Yes Historical Provider   cholecalciferol, vitamin D3, (VITAMIN D3) 25 mcg (1,000 unit) capsule Take 1,000 Units by mouth once daily.   Yes Historical Provider   diclofenac sodium (VOLTAREN) 1 % Gel Apply 2 g topically 4 (four) times daily. To areas of foot pain 10/1/21  Yes Zhanna Durand DPM   fish oil-omega-3 fatty acids 300-1,000 mg capsule Take by mouth once daily.   Yes Historical Provider   Lactobacillus rhamnosus GG (CULTURELLE) 10 billion cell capsule Take 1 capsule by mouth once daily.   Yes Historical Provider   multivitamin (THERAGRAN) per tablet Take 1 tablet by mouth once daily.   Yes Historical Provider   pomegranate xt/pomegranat seed (POMEGRANATE ORAL) Take by mouth.   Yes Historical Provider   pumpkin seed extract-soy germ 300 mg Cap Take by mouth.   Yes Historical Provider   SYNTHROID 150 mcg tablet TAKE 1 TABLET(150 MCG) BY MOUTH BEFORE BREAKFAST  Strength: 150 mcg 9/26/22  Yes Neema Bess NP   tamsulosin (FLOMAX) 0.4 mg Cap Take 1 capsule (0.4 mg total) by mouth once daily. 7/8/22  Yes Silvino Zuñiga MD   tamsulosin (FLOMAX) 0.4 mg Cap Take 1 capsule (0.4 mg total) by mouth once daily. 7/8/22   Silvino Zuñiga MD       Allergies:  Review of patient's allergies indicates:   Allergen Reactions    Shellfish containing products Hives       Social History:  Social History     Tobacco Use    Smoking status: Never    Smokeless tobacco: Never   Substance Use Topics    Alcohol use: No     Comment: rarely     Drug use: No         Review of Systems   Constitutional:  Negative for activity change, appetite change, fatigue and unexpected weight change.   HENT:  Negative for congestion, hearing loss, postnasal drip, sinus pressure and sore throat.    Eyes:  Negative for visual disturbance.   Respiratory:  Negative for cough and shortness of breath.    Cardiovascular:  Negative for chest  "pain and palpitations.   Gastrointestinal:  Negative for abdominal pain, constipation and diarrhea.   Skin:  Positive for rash.   Neurological:  Negative for weakness.   Psychiatric/Behavioral:  The patient is not nervous/anxious.          Objective:   /80 (BP Location: Left arm, Patient Position: Sitting, BP Method: Medium (Manual))   Pulse 82   Ht 6' 6" (1.981 m)   Wt (!) 138.4 kg (305 lb 1.9 oz)   SpO2 97%   BMI 35.26 kg/m²        General: AAO x3, no apparent distress  HEENT: PERRL, OP clear  CV: RRR, no m/r/g  Pulm: Lungs CTAB, no crackles, no wheezes  Abd: s/NT/ND +BS  Extremities: no c/c/e  Skin:  Urticaria bilateral hands, arms, legs evident on exam    Labs:         Assessment/Plan     Zaheer was seen today for urticaria.    Diagnoses and all orders for this visit:    Shellfish allergy  -     Ambulatory referral/consult to Allergy; Future  -     EPINEPHrine (EPIPEN) 0.3 mg/0.3 mL AtIn; Inject 0.3 mLs (0.3 mg total) into the muscle once. for 1 dose    Chronic urticaria  -     Ambulatory referral/consult to Allergy; Future  -     famotidine (PEPCID) 20 MG tablet; Take 1 tablet (20 mg total) by mouth 2 (two) times daily. (Patient not taking: Reported on 2/15/2023)  Referral placed to allergy for further evaluation.  Pepcid 20 mg b.i.d. added to regimen  Continue daily antihistamine.  Epinephrine prescription sent to pharmacy  ED precautions given.  Patient verbalizes understanding.    RTC prn and with PCP as per routine.    Ora Gonzalez MD  Department of Internal Medicine - [facility]      "

## 2024-06-20 DIAGNOSIS — E89.0 POST-SURGICAL HYPOTHYROIDISM: ICD-10-CM

## 2024-06-20 DIAGNOSIS — C73 MALIGNANT NEOPLASM OF THYROID GLAND: ICD-10-CM

## 2024-06-21 DIAGNOSIS — E89.0 POST-SURGICAL HYPOTHYROIDISM: ICD-10-CM

## 2024-06-21 DIAGNOSIS — C73 MALIGNANT NEOPLASM OF THYROID GLAND: ICD-10-CM

## 2024-06-21 RX ORDER — LEVOTHYROXINE SODIUM 13 UG/1
150 CAPSULE ORAL DAILY
Qty: 90 CAPSULE | Refills: 0 | Status: SHIPPED | OUTPATIENT
Start: 2024-06-21 | End: 2024-06-21 | Stop reason: SDUPTHER

## 2024-06-21 RX ORDER — LEVOTHYROXINE SODIUM 13 UG/1
150 CAPSULE ORAL DAILY
Qty: 90 CAPSULE | Refills: 0 | Status: SHIPPED | OUTPATIENT
Start: 2024-06-21 | End: 2025-06-21

## 2024-06-25 NOTE — PROGRESS NOTES
Subjective:      Patient ID: Zaheer Neal is a 64 y.o. male.    Chief Complaint:  No chief complaint on file.    History of Present Illness  Zaheer Neal is here for follow up of Postsurgical Hypothyroidism.  Previously seen by me 5/2023.  This is a MyChart video visit.    The patient location is: [place]  The chief complaint leading to consultation is: Thyroid  Visit type: Virtual visit with synchronous audio and video  Total time spent with patient: see below  Each patient to whom he or she provides medical services by telemedicine is:  (1) informed of the relationship between the physician and patient and the respective role of any other health care provider with respect to management of the patient; and (2) notified that he or she may decline to receive medical services by telemedicine and may withdraw from such care at any time.    With regards to Hypothyroidism:    Thyroid cancer 12/2011 diagnosed in [place].   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  No sonographic evidence of malignancy in this patient with an undetectable serum thyroglobulin level.     Latest Reference Range & Units 07/12/23 07:37   Thyroglobulin Interpretation  SEE BELOW   Thyroglobulin Antibody Screen <1.8 IU/mL <1.8   Thyroglobulin, Tumor Marker ng/mL <0.1       Current Medication:  Tirosint 150mcg daily     Calcium (800mg daily)/Iron: Yes - takes >4 hours from thyroid replacement.     Biotin Use: Denies    Takes thyroid medication properly without food first thing in the morning.    LT4, Synthroid - Hives    Current Symptoms:   Denies skin  changes   Denies unexplained weight gain  Denies fatigue   Reports constipation but tolerable with OTC medication   Denies hair loss  Denies brittle nails  Denies cp, palpations or sob  Denies heat or cold intolerance    With regards to Vitamin D Deficiency:    Vit D, 25-Hydroxy   Date Value Ref Range Status   07/12/2023 39 30 - 96 ng/mL Final     Comment:     Vitamin D deficiency.........<10 ng/mL                              Vitamin D insufficiency......10-29 ng/mL       Vitamin D sufficiency........> or equal to 30 ng/mL  Vitamin D toxicity............>100 ng/mL         Current Meds: OTC Vit D3 2000iu daily     Review of Systems   as above      There were no vitals taken for this visit.      There is no height or weight on file to calculate BMI.    Lab Review:   Lab Results   Component Value Date    HGBA1C 5.4 07/12/2023     Lab Results   Component Value Date    CHOL 87 (L) 09/28/2023    HDL 32 (L) 09/28/2023    LDLCALC 41.8 (L) 09/28/2023    TRIG 66 09/28/2023    CHOLHDL 36.8 09/28/2023     Lab Results   Component Value Date     05/15/2023    K 4.6 05/15/2023     05/15/2023    CO2 30 (H) 05/15/2023    GLU 92 05/15/2023    BUN 15 05/15/2023    CREATININE 1.1 05/15/2023    CALCIUM 9.8 05/15/2023    PROT 7.3 03/05/2024    ALBUMIN 4.1 03/05/2024    BILITOT 0.6 03/05/2024    ALKPHOS 71 03/05/2024    AST 39 03/05/2024    ALT 46 (H) 03/05/2024    ANIONGAP 8 05/15/2023    ESTGFRAFRICA >60.0 06/13/2022    EGFRNONAA >60.0 06/13/2022    TSH 0.364 (L) 07/12/2023     Vit D, 25-Hydroxy   Date Value Ref Range Status   07/12/2023 39 30 - 96 ng/mL Final     Comment:     Vitamin D deficiency.........<10 ng/mL                              Vitamin D insufficiency......10-29 ng/mL       Vitamin D sufficiency........> or equal to 30 ng/mL  Vitamin D toxicity............>100 ng/mL       Assessment and Plan     1. Post-surgical hypothyroidism  TIROSINT 150 mcg Cap    Renal Function Panel    Vitamin D    TSH    Thyroglobulin     Hemoglobin A1C    US Soft Tissue Head Neck      2. Malignant neoplasm of thyroid gland  TIROSINT 150 mcg Cap    Renal Function Panel    Vitamin D    TSH    Thyroglobulin    Hemoglobin A1C    US Soft Tissue Head Neck        Post-surgical hypothyroidism  -- Labs now.  -- Calcium and iron need to be  from thyroid hormone replacement by 4 or > hours.  -- Please note: if you are taking biotin please hold it for 5 days prior to labs as it can interfere with the thyroid testing.  -- Medication Changes:   Tirosint 150mcg daily   -- Clinically and biochemically euthyroid.  -- Goal is a low normal TSH.  -- Avoid exogenous hyperthyroidism as this can accelerate bone loss and increase risk of CV complications.  -- Advised to take LT4 on an empty stomach with water and to wait 30-45 minutes before eating or taking other medications   -- Reviewed usual times of thyroid hormone changes  -- Reviewed that symptoms of hypothyroidism may not correlate with tsh, and a normal TSH is the goal of therapy. Symptoms are not a justification for over treatment.    Malignant neoplasm of thyroid gland  -- Tg and thyroid US due now.  -- 2011 Thyroidectomy, 2012 ARIZA.  -- DEBORAH intermediate risk given extra thyroidal extension.      Follow up in about 1 year (around 7/5/2025).        I spent 20 minutes face-to-face with the patient, over half of the visit was spent on counseling and/or coordinating the care of the patient.    Counseling includes:  Diagnostic results, impressions, recommendations   Prognosis   Risk and benefits of management/treatment options   Instructions for management treatment and or follow-up   Importance of compliance with management   Risk factor reduction   Patient education    Visit today included increased complexity associated with the care of the problems addressed and managing the longitudinal care of the patient due to the serious and/or complex managed problems.

## 2024-07-01 ENCOUNTER — PATIENT OUTREACH (OUTPATIENT)
Dept: ADMINISTRATIVE | Facility: HOSPITAL | Age: 64
End: 2024-07-01
Payer: COMMERCIAL

## 2024-07-01 NOTE — PROGRESS NOTES
Health Maintenance Due   Topic Date Due    HIV Screening  Never done    RSV Vaccine (Age 60+ and Pregnant patients) (1 - 1-dose 60+ series) Never done    COVID-19 Vaccine (7 - 2023-24 season) 12/20/2023       Chart reviewed and updated. Reconciled immunizations.    Soco Evans LPN   Clinical Care Coordinator  Primary Care and Wellness

## 2024-07-05 ENCOUNTER — TELEPHONE (OUTPATIENT)
Dept: ENDOCRINOLOGY | Facility: CLINIC | Age: 64
End: 2024-07-05

## 2024-07-05 ENCOUNTER — OFFICE VISIT (OUTPATIENT)
Dept: ENDOCRINOLOGY | Facility: CLINIC | Age: 64
End: 2024-07-05
Payer: COMMERCIAL

## 2024-07-05 DIAGNOSIS — C73 MALIGNANT NEOPLASM OF THYROID GLAND: ICD-10-CM

## 2024-07-05 DIAGNOSIS — E89.0 POST-SURGICAL HYPOTHYROIDISM: Primary | ICD-10-CM

## 2024-07-05 RX ORDER — LEVOTHYROXINE SODIUM 13 UG/1
150 CAPSULE ORAL DAILY
Qty: 90 CAPSULE | Refills: 3 | Status: SHIPPED | OUTPATIENT
Start: 2024-07-05 | End: 2025-07-05

## 2024-07-05 NOTE — ASSESSMENT & PLAN NOTE
-- Labs now.  -- Calcium and iron need to be  from thyroid hormone replacement by 4 or > hours.  -- Please note: if you are taking biotin please hold it for 5 days prior to labs as it can interfere with the thyroid testing.  -- Medication Changes:   Tirosint 150mcg daily   -- Clinically and biochemically euthyroid.  -- Goal is a low normal TSH.  -- Avoid exogenous hyperthyroidism as this can accelerate bone loss and increase risk of CV complications.  -- Advised to take LT4 on an empty stomach with water and to wait 30-45 minutes before eating or taking other medications   -- Reviewed usual times of thyroid hormone changes  -- Reviewed that symptoms of hypothyroidism may not correlate with tsh, and a normal TSH is the goal of therapy. Symptoms are not a justification for over treatment.

## 2024-07-05 NOTE — ASSESSMENT & PLAN NOTE
-- Tg and thyroid US due now.  -- 2011 Thyroidectomy, 2012 ARIZA.  -- DEBORAH intermediate risk given extra thyroidal extension.

## 2024-07-05 NOTE — TELEPHONE ENCOUNTER
----- Message from Tessa De La Paz sent at 7/5/2024  4:49 PM CDT -----  [employer]      Pt is calling to speak with someone in provider office regarding she states that the patient    TIROSINT 150 mcg Cap has been approved and the auth number is [id number] states you can call if you have any questions [phone]

## 2024-07-05 NOTE — Clinical Note
Labs and neck US now Tirosint Rx needs a PA RTC 1 year  Orders Placed This Encounter     US Soft Tissue Head Neck     Renal Function Panel     Vitamin D     TSH     Thyroglobulin     Hemoglobin A1C

## 2024-07-15 ENCOUNTER — OFFICE VISIT (OUTPATIENT)
Dept: INTERNAL MEDICINE | Facility: CLINIC | Age: 64
End: 2024-07-15
Attending: FAMILY MEDICINE
Payer: COMMERCIAL

## 2024-07-15 ENCOUNTER — PATIENT MESSAGE (OUTPATIENT)
Dept: INTERNAL MEDICINE | Facility: CLINIC | Age: 64
End: 2024-07-15

## 2024-07-15 VITALS
DIASTOLIC BLOOD PRESSURE: 74 MMHG | BODY MASS INDEX: 33.16 KG/M2 | WEIGHT: 286.63 LBS | OXYGEN SATURATION: 96 % | SYSTOLIC BLOOD PRESSURE: 116 MMHG | HEART RATE: 72 BPM | HEIGHT: 78 IN

## 2024-07-15 DIAGNOSIS — Z85.850 HISTORY OF THYROID CANCER: ICD-10-CM

## 2024-07-15 DIAGNOSIS — Z00.00 ANNUAL PHYSICAL EXAM: Primary | ICD-10-CM

## 2024-07-15 DIAGNOSIS — E78.5 HYPERLIPIDEMIA, UNSPECIFIED HYPERLIPIDEMIA TYPE: ICD-10-CM

## 2024-07-15 DIAGNOSIS — R74.8 ELEVATED CK: ICD-10-CM

## 2024-07-15 DIAGNOSIS — R76.8 POSITIVE ANA (ANTINUCLEAR ANTIBODY): ICD-10-CM

## 2024-07-15 DIAGNOSIS — E89.0 POST-SURGICAL HYPOTHYROIDISM: ICD-10-CM

## 2024-07-15 DIAGNOSIS — R74.8 ELEVATED LIVER ENZYMES: ICD-10-CM

## 2024-07-15 DIAGNOSIS — M72.2 PLANTAR FASCIITIS: ICD-10-CM

## 2024-07-15 DIAGNOSIS — E03.9 HYPOTHYROIDISM (ACQUIRED): ICD-10-CM

## 2024-07-15 PROCEDURE — 3074F SYST BP LT 130 MM HG: CPT | Mod: CPTII,S$GLB,, | Performed by: FAMILY MEDICINE

## 2024-07-15 PROCEDURE — 3078F DIAST BP <80 MM HG: CPT | Mod: CPTII,S$GLB,, | Performed by: FAMILY MEDICINE

## 2024-07-15 PROCEDURE — 1159F MED LIST DOCD IN RCRD: CPT | Mod: CPTII,S$GLB,, | Performed by: FAMILY MEDICINE

## 2024-07-15 PROCEDURE — 99396 PREV VISIT EST AGE 40-64: CPT | Mod: S$GLB,,, | Performed by: FAMILY MEDICINE

## 2024-07-15 PROCEDURE — 99999 PR PBB SHADOW E&M-EST. PATIENT-LVL IV: CPT | Mod: PBBFAC,,, | Performed by: FAMILY MEDICINE

## 2024-07-15 PROCEDURE — 3008F BODY MASS INDEX DOCD: CPT | Mod: CPTII,S$GLB,, | Performed by: FAMILY MEDICINE

## 2024-07-15 PROCEDURE — 1160F RVW MEDS BY RX/DR IN RCRD: CPT | Mod: CPTII,S$GLB,, | Performed by: FAMILY MEDICINE

## 2024-07-15 NOTE — ASSESSMENT & PLAN NOTE
-noted on hepatology workup circa 2023.  Unclear contribution of Crestor.  Will discontinue and recheck laboratory in August.  Patient was asymptomatic.

## 2024-07-15 NOTE — ASSESSMENT & PLAN NOTE
-ASCVD risk score 10% 05/15/2023.  Patient was amenable to starting a statin medication at that time.  LDL was 167. Dose later increased.  -elevated LFT, refer to hepatology for assessment. Mild.  Also note that CPK during their workup was mildly elevated.  Experiences no muscle pain, weakness.    -after reviewing chart today, recommend discontinuing Crestor for the time being.  Unclear whether this is contributing or not.  ALT was mildly elevated before initiation.    -check labs in August again.  Further recommendations to follow.  May consider lower strength statin such as pravastatin, or further testing such as calcium score    -CASSIA nml 7/6/2023

## 2024-07-15 NOTE — PROGRESS NOTES
Subjective:       Patient ID: Zaheer Neal is a 64 y.o. male.    Chief Complaint: Annual Exam    Established patient for an annual wellness check/physical exam and also chronic disease management. Specific complaints - see dictation, M*model entries and please see ROS.  Past, Surgical, Family, Social Histories; Medications, Allergies reviewed and reconciled.  Health maintenance file reviewed and addressed items due. Recent applicable lab, imaging and cardiovascular results reviewed.  Problem list items reviewed and modified or added entries (in the overview section) may not be transcribed into this encounter note due to note writer format.      L heel pain 4 months, NKI        Review of Systems   Constitutional:  Negative for activity change and unexpected weight change.   HENT:  Negative for hearing loss, rhinorrhea and trouble swallowing.    Eyes:  Negative for discharge and visual disturbance.   Respiratory:  Negative for chest tightness, shortness of breath and wheezing.    Cardiovascular:  Negative for chest pain, palpitations and leg swelling.   Gastrointestinal:  Negative for blood in stool, constipation, diarrhea and vomiting.   Endocrine: Negative for polydipsia and polyuria.   Genitourinary:  Negative for difficulty urinating, hematuria and urgency.   Musculoskeletal:  Positive for arthralgias. Negative for joint swelling and neck pain.   Neurological:  Negative for weakness and headaches.   Psychiatric/Behavioral:  Negative for confusion and dysphoric mood.        Objective:      Physical Exam  Vitals and nursing note reviewed.   Constitutional:       Appearance: He is well-developed. He is not diaphoretic.   Eyes:      General: No scleral icterus.  Neck:      Thyroid: No thyromegaly.      Vascular: No JVD.      Trachea: No tracheal deviation.   Cardiovascular:      Rate and Rhythm: Normal rate and regular rhythm.      Heart sounds: Normal heart sounds. No murmur heard.     No friction rub. No  gallop.   Pulmonary:      Effort: Pulmonary effort is normal. No respiratory distress.      Breath sounds: Normal breath sounds. No wheezing or rales.   Abdominal:      General: There is no distension.      Palpations: Abdomen is soft. There is no mass.      Tenderness: There is no abdominal tenderness. There is no guarding or rebound.   Musculoskeletal:      Cervical back: Normal range of motion and neck supple.   Lymphadenopathy:      Cervical: No cervical adenopathy.   Skin:     General: Skin is warm and dry.      Findings: No erythema or rash.   Neurological:      Mental Status: He is alert and oriented to person, place, and time.      Cranial Nerves: No cranial nerve deficit.      Motor: No tremor.      Coordination: Coordination normal.      Gait: Gait normal.   Psychiatric:         Behavior: Behavior normal.         Thought Content: Thought content normal.         Judgment: Judgment normal.         Assessment:       1. Annual physical exam    2. Elevated CK    3. Elevated liver enzymes    4. Hyperlipidemia, unspecified hyperlipidemia type    5. Hypothyroidism (acquired)    6. Post-surgical hypothyroidism    7. Positive KENNY (antinuclear antibody)    8. History of thyroid cancer    9. Plantar fasciitis        Plan:     Medication List with Changes/Refills   Current Medications    CETIRIZINE (ZYRTEC) 10 MG TABLET    Take 10 mg by mouth.    EPINEPHRINE (EPIPEN) 0.3 MG/0.3 ML ATIN    Inject 0.3 mLs (0.3 mg total) into the muscle once. for 1 dose    FLUTICASONE PROPIONATE (FLONASE) 50 MCG/ACTUATION NASAL SPRAY    1 spray (50 mcg total) by Each Nostril route 2 (two) times a day.    LACTOBACILLUS RHAMNOSUS GG (CULTURELLE) 10 BILLION CELL CAPSULE    Take 1 capsule by mouth once daily.    SILDENAFIL (VIAGRA) 100 MG TABLET    Take 1 tablet (100 mg total) by mouth daily as needed.    TIROSINT 150 MCG CAP    Take 150 mcg by mouth once daily.   Discontinued Medications    ROSUVASTATIN (CRESTOR) 20 MG TABLET    Take 1  tablet (20 mg total) by mouth once daily. Due for annual with PCP     1. Annual physical exam  -     CK; Future; Expected date: 07/15/2024  -     Basic Metabolic Panel; Future; Expected date: 07/15/2024  -     Lipid Panel; Future; Expected date: 07/15/2024    2. Elevated CK  Assessment & Plan:  -noted on hepatology workup circa 2023.  Unclear contribution of Crestor.  Will discontinue and recheck laboratory in August.  Patient was asymptomatic.    Orders:  -     CK; Future; Expected date: 07/15/2024  -     Basic Metabolic Panel; Future; Expected date: 07/15/2024  -     Lipid Panel; Future; Expected date: 07/15/2024    3. Elevated liver enzymes    4. Hyperlipidemia, unspecified hyperlipidemia type  Assessment & Plan:  -ASCVD risk score 10% 05/15/2023.  Patient was amenable to starting a statin medication at that time.  LDL was 167. Dose later increased.  -elevated LFT, refer to hepatology for assessment. Mild.  Also note that CPK during their workup was mildly elevated.  Experiences no muscle pain, weakness.    -after reviewing chart today, recommend discontinuing Crestor for the time being.  Unclear whether this is contributing or not.  ALT was mildly elevated before initiation.    -check labs in August again.  Further recommendations to follow.  May consider lower strength statin such as pravastatin, or further testing such as calcium score    -[facility] 7/6/2023    Orders:  -     Lipid Panel; Future; Expected date: 07/15/2024    5. Hypothyroidism (acquired)    6. Post-surgical hypothyroidism  Overview:  -Thyroid cancer 2011, thyroidectomy in [place]  -followed in endocrinology    >>OVERVIEW FOR HYPOTHYROIDISM (ACQUIRED) WRITTEN ON 7/15/2024  8:33 AM BY TRE NUNEZ MD    -Thyroid cancer 2011, thyroidectomy in [place]  -followed in endocrinology      7. Positive KENNY (antinuclear antibody)  Assessment & Plan:  -recent hepatology labs      8. History of thyroid cancer  Overview:  -Thyroid cancer 2011, 'stage 3',  thyroidectomy in [place]  -followed in endocrinology      9. Plantar fasciitis  Assessment & Plan:  -heel cord stretching    Orders:  -     Ambulatory referral/consult to Podiatry; Future; Expected date: 07/22/2024      See meds, orders, follow up, routing and instructions sections of encounter and AVS. Discussed with patient and provided on AVS.    Discussed diet and exercise as therapeutic modalities for metabolic and other conditions. Provided patient information, which are included as links on the AVS for detailed information.    Lab Results   Component Value Date     05/15/2023    K 4.6 05/15/2023     05/15/2023    BUN 15 05/15/2023    CREATININE 1.1 05/15/2023    GLU 92 05/15/2023    HGBA1C 5.4 07/12/2023    AST 39 03/05/2024    AST 50 (H) 04/15/2016    ALT 46 (H) 03/05/2024    ALBUMIN 4.1 03/05/2024    PROT 7.3 03/05/2024    BILITOT 0.6 03/05/2024    CHOL 87 (L) 09/28/2023    CHOL 172 09/26/2017    HDL 32 (L) 09/28/2023    LDLCALC 41.8 (L) 09/28/2023    TRIG 66 09/28/2023    WBC 4.06 03/01/2023    HGB 12.8 (L) 03/01/2023    HCT 40.3 03/01/2023     03/01/2023    PSA 0.76 02/16/2024    PSADIAG 1.1 01/05/2023    TSH 0.364 (L) 07/12/2023         Message Endocrinology and hepatology

## 2024-07-18 ENCOUNTER — OFFICE VISIT (OUTPATIENT)
Dept: PODIATRY | Facility: CLINIC | Age: 64
End: 2024-07-18
Payer: COMMERCIAL

## 2024-07-18 VITALS
HEIGHT: 78 IN | BODY MASS INDEX: 33.16 KG/M2 | TEMPERATURE: 97 F | DIASTOLIC BLOOD PRESSURE: 76 MMHG | SYSTOLIC BLOOD PRESSURE: 120 MMHG | HEART RATE: 73 BPM | WEIGHT: 286.63 LBS

## 2024-07-18 DIAGNOSIS — M79.673 PAIN OF PLANTAR ASPECT OF HEEL: ICD-10-CM

## 2024-07-18 DIAGNOSIS — M72.2 PLANTAR FASCIITIS OF LEFT FOOT: Primary | ICD-10-CM

## 2024-07-18 DIAGNOSIS — M79.2 NEUROGENIC PAIN OF LEFT FOOT: ICD-10-CM

## 2024-07-18 DIAGNOSIS — M79.672 LEFT FOOT PAIN: ICD-10-CM

## 2024-07-18 PROCEDURE — 99999 PR PBB SHADOW E&M-EST. PATIENT-LVL III: CPT | Mod: PBBFAC,,, | Performed by: STUDENT IN AN ORGANIZED HEALTH CARE EDUCATION/TRAINING PROGRAM

## 2024-07-18 PROCEDURE — 99213 OFFICE O/P EST LOW 20 MIN: CPT | Mod: 25,S$GLB,, | Performed by: STUDENT IN AN ORGANIZED HEALTH CARE EDUCATION/TRAINING PROGRAM

## 2024-07-18 PROCEDURE — 3078F DIAST BP <80 MM HG: CPT | Mod: CPTII,S$GLB,, | Performed by: STUDENT IN AN ORGANIZED HEALTH CARE EDUCATION/TRAINING PROGRAM

## 2024-07-18 PROCEDURE — 3008F BODY MASS INDEX DOCD: CPT | Mod: CPTII,S$GLB,, | Performed by: STUDENT IN AN ORGANIZED HEALTH CARE EDUCATION/TRAINING PROGRAM

## 2024-07-18 PROCEDURE — 3074F SYST BP LT 130 MM HG: CPT | Mod: CPTII,S$GLB,, | Performed by: STUDENT IN AN ORGANIZED HEALTH CARE EDUCATION/TRAINING PROGRAM

## 2024-07-18 PROCEDURE — 1159F MED LIST DOCD IN RCRD: CPT | Mod: CPTII,S$GLB,, | Performed by: STUDENT IN AN ORGANIZED HEALTH CARE EDUCATION/TRAINING PROGRAM

## 2024-07-18 PROCEDURE — 20550 NJX 1 TENDON SHEATH/LIGAMENT: CPT | Mod: LT,S$GLB,, | Performed by: STUDENT IN AN ORGANIZED HEALTH CARE EDUCATION/TRAINING PROGRAM

## 2024-07-18 NOTE — PROGRESS NOTES
Subjective:     Patient    Zaheer Neal is a 64 y.o. male.    Problem    Seen by Dr Durand in 2021 for right arch pain; recommended OTC arch supports and prescribed Voltaren. Now having left heel pain which is a new issue as of 3-4 months ago. The pain is worse when standing and walking, especially with first steps. The pain does sometimes radiate and burn. Bothers him when first going to bed as well but does not disrupt his sleep overall. No prior injury, has attempted some stretches per PCP with partial temporary improvement.     History    History obtained from patient and review of medical records.     Past Medical History:   Diagnosis Date    Chronic urticaria 11/12/2014    Colonic polyp     Malignant neoplasm of thyroid gland     thyroid    Obesity     Post-surgical hypothyroidism     PUD (peptic ulcer disease)     Urticaria        Past Surgical History:   Procedure Laterality Date    COLONOSCOPY N/A 2/5/2018    Procedure: COLONOSCOPY;  Surgeon: ABHISHEK Hitchcock MD;  Location: [address]);  Service: Endoscopy;  Laterality: N/A;  confirmed appt.    COLONOSCOPY N/A 1/4/2024    Procedure: COLONOSCOPY;  Surgeon: Bob Hammond MD;  Location: [facility] ENDOSCOPY;  Service: Endoscopy;  Laterality: N/A;  Referred by: Dr. Hitchcock (f/u from c-scope on 2/5/2018) / Prep: Suprep / Route instructions sent: portal.   12/28- pre call complete. [facility]    KNEE SURGERY  8/2013    right knee-includes scope    THYROIDECTOMY          Objective:     Vitals  Wt Readings from Last 1 Encounters:   07/18/24 130 kg (286 lb 9.6 oz)     Temp Readings from Last 1 Encounters:   07/18/24 97.3 °F (36.3 °C) (Oral)     BP Readings from Last 1 Encounters:   07/18/24 120/76     Pulse Readings from Last 1 Encounters:   07/18/24 73       Dermatological Exam    Skin:  Pedal hair growth, skin color, and skin texture normal on right  Pedal hair growth, skin color, and skin texture normal on left    Nails:  All nails normal in  length, thickness, color    Vascular Exam    Arteries:  Posterior tibial artery palpable on right  Dorsalis pedis artery palpable on right  Posterior tibial artery palpable on left  Dorsalis pedis artery palpable on left    Veins:  Superficial veins unremarkable on right  Superficial veins unremarkable on left    Swelling:  None on right  None on left    Neurological Exam    [place] touch test:  6/6 sites sensed, light touch intact    [place] Sign:  + at tibial nerve on left     Musculoskeletal Exam    Footwear:  Athletic on right  Athletic on left    Gait Exam:   Ambulatory Status: Ambulatory  Gait: Antalgic  Assistive Devices: None    Foot Progression Angle:  Normal on right  Normal on left    Right Lower Extremity Additional Findings:  Right foot and ankle function, strength, and range of motion unremarkable except as noted above.     Left Lower Extremity Additional Findings:  Pain on palpation of proximal plantar fascia, partially reproduced with windlass test  Left foot and ankle function, strength, and range of motion unremarkable except as noted above.    Imaging and Other Tests    Imaging:  Independently reviewed and interpreted imaging, findings are as follows: N/A     Assessment:     Encounter Diagnoses   Name Primary?    Plantar fasciitis of left foot Yes    Left foot pain     Neurogenic pain of left foot     Pain of plantar aspect of heel         Plan:     I counseled the patient on his conditions, their implications and medical management.    Left plantar heel pain: chronic stable  -Suspect mixed source: nerve + fascia.   -Recommended 5 min/day plantar fascia specific stretching.   -Recommended OTC arch supports.   -Recommended load management, discussed.   -Recommended local steroid injection vs oral steroids. Reviewed potential risks, benefits, alternatives.  Patient opted for injection. After time out was performed, the procedure site was marked and the patient was prepped aseptically. A  diagnostic and therapeutic injection of 4 mg dexamethasone and 1 mL 1% lidocaine plain was given under sterile technique using a 25g x 1.5 needle into the left plantar fascia at site of worst pain. Adhesive bandage applied. Tolerated well.          Return to clinic PRN.

## 2024-07-19 ENCOUNTER — TELEPHONE (OUTPATIENT)
Dept: INTERNAL MEDICINE | Facility: CLINIC | Age: 64
End: 2024-07-19
Payer: COMMERCIAL

## 2024-07-19 RX ORDER — DEXAMETHASONE SODIUM PHOSPHATE 4 MG/ML
4 INJECTION, SOLUTION INTRA-ARTICULAR; INTRALESIONAL; INTRAMUSCULAR; INTRAVENOUS; SOFT TISSUE
Status: COMPLETED | OUTPATIENT
Start: 2024-07-19 | End: 2024-07-19

## 2024-07-19 RX ADMIN — DEXAMETHASONE SODIUM PHOSPHATE 4 MG: 4 INJECTION, SOLUTION INTRA-ARTICULAR; INTRALESIONAL; INTRAMUSCULAR; INTRAVENOUS; SOFT TISSUE at 08:07

## 2024-07-19 NOTE — TELEPHONE ENCOUNTER
----- Message from Tabby Spence NP sent at 7/15/2024 12:41 PM CDT -----  Sounds good thanks!  ----- Message -----  From: Silvino Zuñiga MD  Sent: 7/15/2024  10:54 AM CDT  To: Tabby Spence NP; Neema Bess NP    Looked over patient's chart today.  The elevation of CPK seem to be noted after starting on rosuvastatin.  Started him on this for an ASCVD risk score at 10%.  I will go ahead and stop this at this time and add a recheck in August when he is due for the laboratory with you.  May need to discuss future options for cholesterol management at that time.  Please let me know your thoughts, thank you

## 2024-08-15 ENCOUNTER — HOSPITAL ENCOUNTER (OUTPATIENT)
Dept: ENDOCRINOLOGY | Facility: CLINIC | Age: 64
Discharge: HOME | End: 2024-08-15
Attending: NURSE PRACTITIONER
Payer: COMMERCIAL

## 2024-08-15 DIAGNOSIS — C73 MALIGNANT NEOPLASM OF THYROID GLAND: ICD-10-CM

## 2024-08-15 DIAGNOSIS — E89.0 POST-SURGICAL HYPOTHYROIDISM: ICD-10-CM

## 2024-08-15 PROCEDURE — 76536 US EXAM OF HEAD AND NECK: CPT | Mod: S$GLB,,, | Performed by: INTERNAL MEDICINE

## 2024-08-27 ENCOUNTER — LAB VISIT (OUTPATIENT)
Dept: LAB | Facility: HOSPITAL | Age: 64
End: 2024-08-27
Attending: NURSE PRACTITIONER
Payer: COMMERCIAL

## 2024-08-27 DIAGNOSIS — K74.00 LIVER FIBROSIS: ICD-10-CM

## 2024-08-27 DIAGNOSIS — E78.5 HYPERLIPIDEMIA, UNSPECIFIED HYPERLIPIDEMIA TYPE: ICD-10-CM

## 2024-08-27 DIAGNOSIS — R74.8 ELEVATED LIVER ENZYMES: ICD-10-CM

## 2024-08-27 DIAGNOSIS — R74.8 ELEVATED CK: ICD-10-CM

## 2024-08-27 DIAGNOSIS — Z00.00 ANNUAL PHYSICAL EXAM: ICD-10-CM

## 2024-08-27 DIAGNOSIS — E89.0 POST-SURGICAL HYPOTHYROIDISM: ICD-10-CM

## 2024-08-27 DIAGNOSIS — C73 MALIGNANT NEOPLASM OF THYROID GLAND: ICD-10-CM

## 2024-08-27 LAB
25(OH)D3+25(OH)D2 SERPL-MCNC: 36 NG/ML (ref 30–96)
ALBUMIN SERPL BCP-MCNC: 4.5 G/DL (ref 3.5–5.2)
ALBUMIN SERPL BCP-MCNC: 4.5 G/DL (ref 3.5–5.2)
ALP SERPL-CCNC: 76 U/L (ref 38–126)
ALT SERPL W/O P-5'-P-CCNC: 33 U/L (ref 10–44)
ANION GAP SERPL CALC-SCNC: 10 MMOL/L (ref 8–16)
AST SERPL-CCNC: 36 U/L (ref 15–46)
BILIRUB SERPL-MCNC: 0.6 MG/DL (ref 0.1–1)
CALCIUM SERPL-MCNC: 9.3 MG/DL (ref 8.7–10.5)
CHLORIDE SERPL-SCNC: 104 MMOL/L (ref 95–110)
CHOLEST SERPL-MCNC: 180 MG/DL (ref 120–199)
CHOLEST/HDLC SERPL: 4.6 {RATIO} (ref 2–5)
CK SERPL-CCNC: 246 U/L (ref 55–170)
CO2 SERPL-SCNC: 31 MMOL/L (ref 23–29)
CREAT SERPL-MCNC: 1.1 MG/DL (ref 0.5–1.4)
EST. GFR  (NO RACE VARIABLE): >60 ML/MIN/1.73 M^2
ESTIMATED AVG GLUCOSE: 114 MG/DL (ref 68–131)
GLUCOSE SERPL-MCNC: 105 MG/DL (ref 70–110)
HBA1C MFR BLD: 5.6 % (ref 4–5.6)
HDLC SERPL-MCNC: 39 MG/DL (ref 40–75)
HDLC SERPL: 21.7 % (ref 20–50)
IGG SERPL-MCNC: 1249 MG/DL (ref 650–1600)
LDLC SERPL CALC-MCNC: 116.6 MG/DL (ref 63–159)
NONHDLC SERPL-MCNC: 141 MG/DL
PHOSPHATE SERPL-MCNC: 4 MG/DL (ref 2.7–4.5)
POTASSIUM SERPL-SCNC: 4.4 MMOL/L (ref 3.5–5.1)
PROT SERPL-MCNC: 7.8 G/DL (ref 6–8.4)
SODIUM SERPL-SCNC: 145 MMOL/L (ref 136–145)
TRIGL SERPL-MCNC: 122 MG/DL (ref 30–150)
TSH SERPL DL<=0.005 MIU/L-ACNC: <0.026 UIU/ML (ref 0.4–4)
UUN UR-MCNC: 15 MG/DL (ref 2–20)

## 2024-08-27 PROCEDURE — 82550 ASSAY OF CK (CPK): CPT | Mod: PN | Performed by: FAMILY MEDICINE

## 2024-08-27 PROCEDURE — 84443 ASSAY THYROID STIM HORMONE: CPT | Mod: PN | Performed by: NURSE PRACTITIONER

## 2024-08-27 PROCEDURE — 82784 ASSAY IGA/IGD/IGG/IGM EACH: CPT | Mod: PN | Performed by: NURSE PRACTITIONER

## 2024-08-27 PROCEDURE — 80061 LIPID PANEL: CPT | Performed by: FAMILY MEDICINE

## 2024-08-27 PROCEDURE — 80069 RENAL FUNCTION PANEL: CPT | Mod: PN | Performed by: NURSE PRACTITIONER

## 2024-08-27 PROCEDURE — 80076 HEPATIC FUNCTION PANEL: CPT | Mod: PN | Performed by: NURSE PRACTITIONER

## 2024-08-27 PROCEDURE — 86800 THYROGLOBULIN ANTIBODY: CPT | Mod: PN | Performed by: NURSE PRACTITIONER

## 2024-08-27 PROCEDURE — 82306 VITAMIN D 25 HYDROXY: CPT | Mod: PN | Performed by: NURSE PRACTITIONER

## 2024-08-27 PROCEDURE — 86039 ANTINUCLEAR ANTIBODIES (ANA): CPT | Mod: PN | Performed by: NURSE PRACTITIONER

## 2024-08-27 PROCEDURE — 86235 NUCLEAR ANTIGEN ANTIBODY: CPT | Mod: 59,PN | Performed by: NURSE PRACTITIONER

## 2024-08-27 PROCEDURE — 86015 ACTIN ANTIBODY EACH: CPT | Mod: PN | Performed by: NURSE PRACTITIONER

## 2024-08-27 PROCEDURE — 84439 ASSAY OF FREE THYROXINE: CPT | Performed by: NURSE PRACTITIONER

## 2024-08-27 PROCEDURE — 84432 ASSAY OF THYROGLOBULIN: CPT | Mod: PN | Performed by: NURSE PRACTITIONER

## 2024-08-27 PROCEDURE — 86038 ANTINUCLEAR ANTIBODIES: CPT | Mod: PN | Performed by: NURSE PRACTITIONER

## 2024-08-27 PROCEDURE — 83036 HEMOGLOBIN GLYCOSYLATED A1C: CPT | Performed by: NURSE PRACTITIONER

## 2024-08-28 LAB
ANA PATTERN 1: NORMAL
ANA SER QL IF: POSITIVE
ANA TITR SER IF: NORMAL {TITER}
T4 FREE SERPL-MCNC: 1.17 NG/DL (ref 0.71–1.51)

## 2024-08-29 ENCOUNTER — TELEPHONE (OUTPATIENT)
Dept: INTERNAL MEDICINE | Facility: CLINIC | Age: 64
End: 2024-08-29
Payer: COMMERCIAL

## 2024-08-29 ENCOUNTER — PATIENT MESSAGE (OUTPATIENT)
Dept: ENDOCRINOLOGY | Facility: CLINIC | Age: 64
End: 2024-08-29
Payer: COMMERCIAL

## 2024-08-29 DIAGNOSIS — R74.8 ELEVATED CK: Primary | ICD-10-CM

## 2024-08-29 DIAGNOSIS — E89.0 POST-SURGICAL HYPOTHYROIDISM: Primary | ICD-10-CM

## 2024-08-29 LAB
SMOOTH MUSCLE AB TITR SER IF: NORMAL {TITER}
THRYOGLOBULIN INTERPRETATION: NORMAL
THYROGLOB AB SERPL-ACNC: <1.8 IU/ML
THYROGLOB SERPL-MCNC: <0.1 NG/ML

## 2024-08-29 NOTE — TELEPHONE ENCOUNTER
Component      Latest Ref Rng 8/27/2024   Glucose      70 - 110 mg/dL 105    Sodium      136 - 145 mmol/L 145    Potassium      3.5 - 5.1 mmol/L 4.4    Chloride      95 - 110 mmol/L 104    CO2      23 - 29 mmol/L 31 (H)    BUN      2 - 20 mg/dL 15    Calcium      8.7 - 10.5 mg/dL 9.3    Creatinine      0.50 - 1.40 mg/dL 1.10    Albumin      3.5 - 5.2 g/dL 4.5    Phosphorus Level      2.7 - 4.5 mg/dL 4.0    eGFR      >60 mL/min/1.73 m^2 >60.0    Anion Gap      8 - 16 mmol/L 10    Thyroglobulin, Tumor Marker      ng/mL <0.1    Thyroglobulin Antibody Screen      <1.8 IU/mL <1.8    Thyroglobulin Interpretation SEE BELOW    Hemoglobin A1C External      4.0 - 5.6 % 5.6    Estimated Avg Glucose      68 - 131 mg/dL 114    Vitamin D      30 - 96 ng/mL 36    TSH      0.400 - 4.000 uIU/mL <0.026 (L)    Free T4      0.71 - 1.51 ng/dL 1.17       Legend:  (H) High  (L) Low

## 2024-08-30 LAB
ANTI SM ANTIBODY: 0.09 RATIO (ref 0–0.99)
ANTI SM/RNP ANTIBODY: 0.06 RATIO (ref 0–0.99)
ANTI-SM INTERPRETATION: NEGATIVE
ANTI-SM/RNP INTERPRETATION: NEGATIVE
ANTI-SSA ANTIBODY: 0.06 RATIO (ref 0–0.99)
ANTI-SSA INTERPRETATION: NEGATIVE
ANTI-SSB ANTIBODY: 0.07 RATIO (ref 0–0.99)
ANTI-SSB INTERPRETATION: NEGATIVE
DSDNA AB SER-ACNC: NORMAL [IU]/ML

## 2024-08-30 NOTE — TELEPHONE ENCOUNTER
Please call patient and explain that the test(s) show mild elevation of CK which is a muscle inflammation test.  The result was the same as the 1 in July.    I would like to refer to the rheumatology department for further evaluation and treatment.    Please see referral orders and please call patient to schedule.     Thank you.

## 2024-09-09 ENCOUNTER — PATIENT MESSAGE (OUTPATIENT)
Dept: HEPATOLOGY | Facility: CLINIC | Age: 64
End: 2024-09-09
Payer: COMMERCIAL

## 2024-09-10 ENCOUNTER — OFFICE VISIT (OUTPATIENT)
Dept: HEPATOLOGY | Facility: CLINIC | Age: 64
End: 2024-09-10
Payer: COMMERCIAL

## 2024-09-10 VITALS — WEIGHT: 290.13 LBS | HEIGHT: 78 IN | BODY MASS INDEX: 33.57 KG/M2

## 2024-09-10 DIAGNOSIS — E66.09 CLASS 1 OBESITY DUE TO EXCESS CALORIES WITHOUT SERIOUS COMORBIDITY WITH BODY MASS INDEX (BMI) OF 33.0 TO 33.9 IN ADULT: ICD-10-CM

## 2024-09-10 DIAGNOSIS — K74.00 LIVER FIBROSIS: Primary | ICD-10-CM

## 2024-09-10 DIAGNOSIS — K76.0 METABOLIC DYSFUNCTION-ASSOCIATED STEATOTIC LIVER DISEASE (MASLD): ICD-10-CM

## 2024-09-10 DIAGNOSIS — E78.5 HYPERLIPIDEMIA, UNSPECIFIED HYPERLIPIDEMIA TYPE: ICD-10-CM

## 2024-09-10 PROBLEM — R74.8 ELEVATED LIVER ENZYMES: Status: RESOLVED | Noted: 2023-08-18 | Resolved: 2024-09-10

## 2024-09-10 PROCEDURE — 3008F BODY MASS INDEX DOCD: CPT | Mod: CPTII,S$GLB,, | Performed by: NURSE PRACTITIONER

## 2024-09-10 PROCEDURE — 99214 OFFICE O/P EST MOD 30 MIN: CPT | Mod: S$GLB,,, | Performed by: NURSE PRACTITIONER

## 2024-09-10 PROCEDURE — 1160F RVW MEDS BY RX/DR IN RCRD: CPT | Mod: CPTII,S$GLB,, | Performed by: NURSE PRACTITIONER

## 2024-09-10 PROCEDURE — 99999 PR PBB SHADOW E&M-EST. PATIENT-LVL III: CPT | Mod: PBBFAC,,, | Performed by: NURSE PRACTITIONER

## 2024-09-10 PROCEDURE — 1159F MED LIST DOCD IN RCRD: CPT | Mod: CPTII,S$GLB,, | Performed by: NURSE PRACTITIONER

## 2024-09-10 PROCEDURE — 3044F HG A1C LEVEL LT 7.0%: CPT | Mod: CPTII,S$GLB,, | Performed by: NURSE PRACTITIONER

## 2024-09-10 RX ORDER — ROSUVASTATIN CALCIUM 5 MG/1
5 TABLET, COATED ORAL DAILY
COMMUNITY

## 2024-09-10 RX ORDER — TAMSULOSIN HYDROCHLORIDE 0.4 MG/1
1 CAPSULE ORAL
COMMUNITY
Start: 2024-08-31

## 2024-09-10 NOTE — Clinical Note
His repeat liver enzymes are normal and his KENNY is trending down and near normal after stopping the Crestor so they may be related since Crestor can cause an autoimmune type inflammation picture. Let me know if you plan to start a different statin and I can monitor labs after starting.  Thanks!

## 2024-09-10 NOTE — PROGRESS NOTES
[facility] VISIT FOLLOW UP NOTE    PCP: Silvino Zuñiga MD     CHIEF COMPLAINT: elevated liver enzymes, fatty liver (?), liver fibrosis (?), positive KENNY    HPI: This is a 64 y.o. male with PMH noted below, presenting for follow up of above    Serological workup was negative for Josué's, alpha-1 antitrypsin deficiency, hemochromatosis,  and viral hepatitis.   + KENNY 1:160, negative AMA, ASMA and IgG  Mildly elevated CK (240s), near normal     Prior serologic workup:   Lab Results   Component Value Date    SMOOTHMUSCAB Negative 1:40 08/27/2024    AMAIFA Negative 1:40 03/05/2024    IGGSERUM 1249 08/27/2024    ANASCREEN Positive (A) 08/27/2024    FESATURATED 19 (L) 03/05/2024    CERULOPLSM 28.0 03/05/2024    HEPBSAG Non-reactive 09/06/2023    HEPCAB Non-reactive 09/06/2023    HEPAIGM Non-reactive 09/06/2023       Liver fibrosis staging:  -- fibroscan 3/2024 noted F2, S2 (kPA 7.7, )  -- fibroscan 9/2024 - unable to obtain due to inconsistent images, body habitus per mA/RN    Risk factors for NAFLD include obesity, HLD    Interval HPI: Presents today alone. US normal, no fatty liver noted but previous fibroscan suggests ~30% fat in the liver and possible stage 2 scar tissue. Unable to get consistent images on repeat fibroscan so unsure of fibrosis staging  MRI not covered by insurance   PCP stopped statin 7/2024, repeat liver enzymes normal and KENNY trending down so Crestor may have been contributing   CK near normal, do not think it is contributing   +KENNY, trending down, repeat liver enzymes normal so no suspicion for AIH  current wt 290, similar to previous     Labs done 8/2024 show normal transaminase levels   Platelets WNL, alk phos WNL  Synthetic liver functioning WNL    Lab Results   Component Value Date    ALT 33 08/27/2024    AST 36 08/27/2024    ALKPHOS 76 08/27/2024    BILITOT 0.6 08/27/2024    ALBUMIN 4.5 08/27/2024    ALBUMIN 4.5 08/27/2024    INR 1.0 09/18/2011     03/01/2023  "      Abd U/S done 8/2023 showed normal liver     Denies family history of liver disease . Denies significant alcohol consumption currently or in the past-   Social History     Substance and Sexual Activity   Alcohol Use Yes    Comment: couple of times per month         +Immunity to Hep A  - needs Hep B vaccine, previously sent to [facility] pharm and iD        Allergy and medication list reviewed and updated     PMHX:  has a past medical history of Chronic urticaria (11/12/2014), Colonic polyp, Malignant neoplasm of thyroid gland, Obesity, Post-surgical hypothyroidism, PUD (peptic ulcer disease), and Urticaria.    PSHX:  has a past surgical history that includes Knee surgery (8/2013); Thyroidectomy; Colonoscopy (N/A, 2/5/2018); and Colonoscopy (N/A, 1/4/2024).    FAMILY HISTORY: Updated and reviewed in [facility]    SOCIAL HISTORY:   Social History     Substance and Sexual Activity   Alcohol Use Yes    Comment: couple of times per month       Social History     Substance and Sexual Activity   Drug Use No       ROS:   GENERAL: Denies fatigue  CARDIOVASCULAR: Denies edema  GI: Denies abdominal pain  SKIN: Denies rash, itching   NEURO: Denies confusion, memory loss, or mood changes    PHYSICAL EXAM:   Friendly Black or  male, in no acute distress; alert and oriented to person, place and time  VITALS: Ht 6' 6" (1.981 m)   Wt 131.6 kg (290 lb 2 oz)   BMI 33.53 kg/m²   EYES: Sclerae anicteric  GI: Soft, non-tender, non-distended. No ascites.  EXTREMITIES:  No edema.  SKIN: Warm and dry. No jaundice. No telangectasias noted. No palmar erythema.  NEURO:  No asterixis.  PSYCH:  Thought and speech pattern appropriate. Behavior normal      EDUCATION:  See instructions discussed with patient in Instructions section of the After Visit Summary     ASSESSMENT & PLAN:  64 y.o. male with:  1. Elevated liver enzymes, + KENNY  Both improved after stopping crestor, may be contributing? Would avoid restarting if possible. If starts " a different statin, can monitor liver enzymes at 6 weeks and 3 months (message sent  pcp )    --- synthetic liver function WNL  --- Abd US noted normal liver  --- previous serological work up : see HPI  --- Hep A and B immunity: see HPI    2. Liver fibrosis (?)  F2 on fibroscan,  would recommend comparing to MRI elasto but insurance would not cover MRI  Attempted repeat fibroscan today but unable to get consistent waveforms, unsure of fibrosis  staging  Only other options are biopsy (not indicated, liver enzymes normal) or US elasto (has a higher degree of inaccuracy/higher chance of overstaging fibrosis)  Since no other options, will arrange US elasto. If Fo, will discharge   If fibrosis, will need to consider future options    3. Metabolic associated steatotic liver disease ?  None on US but minimal on fibroscan  - see HPI  -- Immunity to Hep A and B : see HPI  -- Recommendations discussed with patient:  Limit alcohol consumption, no more than 1-2 serving(s) of alcohol in any day (1 serving is 5 ounces of wine, 12 ounces of beer, or 1.5 ounces of liquor) and do NOT recommend any daily alcohol use    2 Weight loss goal of 10-15 lbs  3. Low carb/sugar, high fiber and protein diet, limit your carb intake to LESS than 30-45 grams of carbs with a meal or LESS than 5-10 grams with any snack   4. Exercise, 5 days per week, 30 minutes per day, as tolerated  5. Recommend good cholesterol, blood pressure, blood sugar levels     4. Obesity, HLD  -- Body mass index is 33.53 kg/m².   -- increases risk for fatty liver            No follow-ups on file. Based on results of US elasto  Orders Placed This Encounter   Procedures    US Elastography Liver w/imaging        Thank you for allowing me to participate in the care of DRAKE Cabral    I spent a total of 30 minutes on the day of the visit.This includes face to face time and non-face to face time preparing to see the patient (eg, review of  tests), obtaining and/or reviewing separately obtained history, documenting clinical information in the electronic or other health record, independently interpreting results and communicating results to the patient/family/caregiver, and coordinating care.         CC'ed note to:   Silvino Zuñiga MD

## 2024-09-10 NOTE — PATIENT INSTRUCTIONS
1. Previous Fibroscan to look for fat or scar tissue in the liver showed possible mild fatty liver and possible scar tissue. They were unable to get the fibroscan today. Usually, we obtain an MRI to compare but your insurance did not pay for it, so the only other option (besides a biopsy which is not needed currently) is to do an older scar tissue scan called an US elastography. If that is normal, no follow up will be needed. If it shows scar tissue, we will have to brainstorm about other options or maybe your insurance will eventually cover the MRI  2.  Follow up based on results of US elasto    These things are important to improve fatty liver:  Limit alcohol consumption  2 Weight loss goal of 15 lbs. [facility] offers benefits to patients so let me know if you want a referral to the [facility] gym. Also, [facility] has dieticians that can create a weight loss plan. If you are interested let me know and I can place a referral. If so, contact the dietician team at [facility] at email [email or web address] or call [phone].  to get scheduled. They do offer video visits   3. Avoid processed foods. No fried/fast foods. No sugary drinks or drinks with any calories.   4. Low carb/sugar and high protein diet (~1 g/kg/day of protein).Try to limit your carb intake to LESS than 30-45 grams of carbs with a meal or LESS than 5-10 grams with any snack (total of any snack foods eaten during that time). Use One Hour Translation Pal or Lose It sammy to add up your carbs through the day. Do NOT drink any beverages with calories or carbs (this can lead to high blood sugar and weight gain). The main thing to focus on is high protein, low carb)  5. Exercise, 5 days per week, 30 minutes per day, as tolerated  6. Recommend good cholesterol, blood pressure, blood sugar levels   7. There is a new medication called Rezdiffra for the treatment of F2-F3 liver scarring due to fatty liver. It is only  indicated for patients with significant scar tissue from fatty liver (can reassess after further testing)    In some people, fatty liver can progress to steatohepatitis (inflamatory fatty liver) and possibly to cirrhosis, increasing the risk for liver cancer, liver failure, and death. Therefore, the lifestyle changes are very important to decrease this risk.

## 2024-09-16 ENCOUNTER — HOSPITAL ENCOUNTER (OUTPATIENT)
Dept: RADIOLOGY | Facility: HOSPITAL | Age: 64
Discharge: HOME | End: 2024-09-16
Attending: NURSE PRACTITIONER
Payer: COMMERCIAL

## 2024-09-16 DIAGNOSIS — K76.0 METABOLIC DYSFUNCTION-ASSOCIATED STEATOTIC LIVER DISEASE (MASLD): ICD-10-CM

## 2024-09-16 PROCEDURE — 76981 USE PARENCHYMA: CPT | Mod: TC

## 2024-09-16 PROCEDURE — 76981 USE PARENCHYMA: CPT | Mod: 26,,, | Performed by: RADIOLOGY

## 2024-09-24 ENCOUNTER — TELEPHONE (OUTPATIENT)
Dept: HEPATOLOGY | Facility: CLINIC | Age: 64
End: 2024-09-24
Payer: COMMERCIAL

## 2024-09-24 ENCOUNTER — PATIENT MESSAGE (OUTPATIENT)
Dept: HEPATOLOGY | Facility: CLINIC | Age: 64
End: 2024-09-24
Payer: COMMERCIAL

## 2024-09-24 DIAGNOSIS — K74.00 LIVER FIBROSIS: Primary | ICD-10-CM

## 2024-09-24 DIAGNOSIS — K76.0 METABOLIC DYSFUNCTION-ASSOCIATED STEATOTIC LIVER DISEASE (MASLD): ICD-10-CM

## 2024-09-24 NOTE — TELEPHONE ENCOUNTER
I discussed this patient's case with Dr. Barclay and their pertinent findings during our weekly Patient Care Conference. I presented their medical history, relevant labs, and imaging to the physician. We discussed their plan of care and current assessment.    Discussed fibroscan in the past early F2 but unable to get repeat fibroscan recently because of  body habitus. US elasto notes F2-F3 (although high degree of inaccuracy).   Recommends MRI elasto and/or ELF lab test to compare    Message sent to pt in [facility]

## 2024-09-25 DIAGNOSIS — E89.0 POST-SURGICAL HYPOTHYROIDISM: ICD-10-CM

## 2024-09-25 DIAGNOSIS — C73 MALIGNANT NEOPLASM OF THYROID GLAND: ICD-10-CM

## 2024-09-25 RX ORDER — LEVOTHYROXINE SODIUM 13 UG/1
150 CAPSULE ORAL DAILY
Qty: 90 CAPSULE | Refills: 3 | Status: CANCELLED | OUTPATIENT
Start: 2024-09-25 | End: 2025-09-25

## 2024-09-25 RX ORDER — LEVOTHYROXINE SODIUM 13 UG/1
150 CAPSULE ORAL DAILY
Qty: 90 CAPSULE | Refills: 3 | Status: SHIPPED | OUTPATIENT
Start: 2024-09-25 | End: 2025-09-25

## 2024-09-30 ENCOUNTER — PATIENT OUTREACH (OUTPATIENT)
Dept: ADMINISTRATIVE | Facility: HOSPITAL | Age: 64
End: 2024-09-30
Payer: COMMERCIAL

## 2024-09-30 NOTE — PROGRESS NOTES
Health Maintenance Due   Topic Date Due    HIV Screening  Never done    RSV Vaccine (Age 60+ and Pregnant patients) (1 - Risk 60-74 years 1-dose series) Never done       Chart reviewed and updated. Reconciled immunizations.    Soco Evans LPN   Clinical Care Coordinator  Primary Care and Wellness

## 2024-10-04 ENCOUNTER — HOSPITAL ENCOUNTER (OUTPATIENT)
Dept: RADIOLOGY | Facility: HOSPITAL | Age: 64
Discharge: HOME | End: 2024-10-04
Attending: NURSE PRACTITIONER
Payer: COMMERCIAL

## 2024-10-04 DIAGNOSIS — K76.0 METABOLIC DYSFUNCTION-ASSOCIATED STEATOTIC LIVER DISEASE (MASLD): ICD-10-CM

## 2024-10-04 DIAGNOSIS — K74.00 LIVER FIBROSIS: ICD-10-CM

## 2024-10-04 PROCEDURE — 76391 MR ELASTOGRAPHY: CPT | Mod: TC

## 2024-10-04 PROCEDURE — 76391 MR ELASTOGRAPHY: CPT | Mod: 26,,, | Performed by: RADIOLOGY

## 2024-10-24 ENCOUNTER — LAB VISIT (OUTPATIENT)
Dept: LAB | Facility: HOSPITAL | Age: 64
End: 2024-10-24
Attending: NURSE PRACTITIONER
Payer: COMMERCIAL

## 2024-10-24 DIAGNOSIS — E89.0 POST-SURGICAL HYPOTHYROIDISM: ICD-10-CM

## 2024-10-24 LAB
T4 FREE SERPL-MCNC: 1.12 NG/DL (ref 0.71–1.51)
TSH SERPL DL<=0.005 MIU/L-ACNC: 0.23 UIU/ML (ref 0.4–4)

## 2024-10-24 PROCEDURE — 84439 ASSAY OF FREE THYROXINE: CPT | Performed by: NURSE PRACTITIONER

## 2024-10-24 PROCEDURE — 84443 ASSAY THYROID STIM HORMONE: CPT | Mod: PN | Performed by: NURSE PRACTITIONER

## 2024-10-24 PROCEDURE — 36415 COLL VENOUS BLD VENIPUNCTURE: CPT | Mod: PN | Performed by: NURSE PRACTITIONER

## 2024-10-25 ENCOUNTER — PATIENT MESSAGE (OUTPATIENT)
Dept: ENDOCRINOLOGY | Facility: CLINIC | Age: 64
End: 2024-10-25
Payer: COMMERCIAL

## 2024-10-25 NOTE — TELEPHONE ENCOUNTER
Latest Reference Range & Units 08/27/24 09:55 10/24/24 11:22   TSH 0.400 - 4.000 uIU/mL <0.026 (L) 0.232 (L)   Free T4 0.71 - 1.51 ng/dL 1.17 1.12   (L): Data is abnormally low

## 2024-11-13 ENCOUNTER — OFFICE VISIT (OUTPATIENT)
Dept: RHEUMATOLOGY | Facility: CLINIC | Age: 64
End: 2024-11-13
Payer: COMMERCIAL

## 2024-11-13 VITALS
HEART RATE: 68 BPM | HEIGHT: 78 IN | BODY MASS INDEX: 33.62 KG/M2 | SYSTOLIC BLOOD PRESSURE: 138 MMHG | WEIGHT: 290.56 LBS | DIASTOLIC BLOOD PRESSURE: 84 MMHG

## 2024-11-13 DIAGNOSIS — R74.8 ELEVATED CK: ICD-10-CM

## 2024-11-13 DIAGNOSIS — R76.8 POSITIVE ANA (ANTINUCLEAR ANTIBODY): Primary | ICD-10-CM

## 2024-11-13 PROCEDURE — 3075F SYST BP GE 130 - 139MM HG: CPT | Mod: CPTII,S$GLB,, | Performed by: STUDENT IN AN ORGANIZED HEALTH CARE EDUCATION/TRAINING PROGRAM

## 2024-11-13 PROCEDURE — 1159F MED LIST DOCD IN RCRD: CPT | Mod: CPTII,S$GLB,, | Performed by: STUDENT IN AN ORGANIZED HEALTH CARE EDUCATION/TRAINING PROGRAM

## 2024-11-13 PROCEDURE — 3044F HG A1C LEVEL LT 7.0%: CPT | Mod: CPTII,S$GLB,, | Performed by: STUDENT IN AN ORGANIZED HEALTH CARE EDUCATION/TRAINING PROGRAM

## 2024-11-13 PROCEDURE — 3008F BODY MASS INDEX DOCD: CPT | Mod: CPTII,S$GLB,, | Performed by: STUDENT IN AN ORGANIZED HEALTH CARE EDUCATION/TRAINING PROGRAM

## 2024-11-13 PROCEDURE — 3079F DIAST BP 80-89 MM HG: CPT | Mod: CPTII,S$GLB,, | Performed by: STUDENT IN AN ORGANIZED HEALTH CARE EDUCATION/TRAINING PROGRAM

## 2024-11-13 PROCEDURE — 99204 OFFICE O/P NEW MOD 45 MIN: CPT | Mod: S$GLB,,, | Performed by: STUDENT IN AN ORGANIZED HEALTH CARE EDUCATION/TRAINING PROGRAM

## 2024-11-13 PROCEDURE — 99999 PR PBB SHADOW E&M-EST. PATIENT-LVL III: CPT | Mod: PBBFAC,,, | Performed by: STUDENT IN AN ORGANIZED HEALTH CARE EDUCATION/TRAINING PROGRAM

## 2024-11-13 NOTE — PROGRESS NOTES
11/7/2024    12:51 PM   Rapid3 Question Responses and Scores   MDHAQ Score 0   Psychologic Score 0   Pain Score 1.5   When you awakened in the morning OVER THE LAST WEEK, did you feel stiff? No   Fatigue Score 0   Global Health Score 0   RAPID3 Score 0.5

## 2024-11-13 NOTE — PROGRESS NOTES
RHEUMATOLOGY OUTPATIENT CLINIC NOTE    11/13/2024    Attending Rheumatologist: Karyna Del Rio  Primary Care Provider: Silvino Zuñiga MD   Physician Requesting Consultation: Silvino Zuñiga MD  [id number] [place] [address]  Chief Complaint/Reason For Consultation:  Abnormal Lab and Joint Pain      Subjective:       HPI  Zaheer Neal is a 64 y.o. Black or  male who comes for evaluation of elevated CK.     He reports intermittent aches at times, shoulders, knees, hands, feet at times. Sometimes notices difficulty taking ring off at the end of the day. Noted intermittent swelling in right knee.   He denies any muscle weakness, skin rashes, oral ulcers,  hands, raynauds, SOB, CP, trouble swallowing, abdominal pain, hematochezia.   He used to play basketball. Played in college, high school, [employer].     He follows in the hepatology clinic for elevated liver enzymes. During workup he was found to have mild elevated CK and positive KENNY.   Liver enzymes normalized after stopping atorvastatin. No biopsy planned. MRI     He also has post surgical hypothyroidism after thyroid cancer. stable. Follows with endocrinology.     Smokes 1 cigar very sporadic. Drinks alcohol occasionally. No illicit drugs.     Denies any family history of autoimmune conditions.     Labs  8/2024  KENNY 1:320 homogeneus  Negative DSDNA, SSA, SSB, SM, SM/RNP  Negative ASMA, AMA, IgG              Component Ref Range & Units 2 mo ago  (8/27/24) 8 mo ago  (3/5/24) 1 yr ago  (9/6/23) 13 yr ago  (9/19/11) 13 yr ago  (9/19/11) 13 yr ago  (9/18/11)   CPK 55 - 170 U/L 246 High  219 High  276 High  84 R 86 R 114 R   Resulting Agency  RPLB RPLB RPLB LISLLB LISLLB LISLLB            Review of Systems   Constitutional:  Negative for fever and unexpected weight change.   HENT:  Negative for mouth sores and trouble swallowing.    Eyes:  Negative for redness.   Respiratory:  Negative for cough and  shortness of breath.    Cardiovascular:  Negative for chest pain.   Gastrointestinal:  Negative for constipation and diarrhea.   Genitourinary:  Negative for genital sores.   Integumentary:  Negative for rash.   Neurological:  Negative for headaches.   Hematological:  Does not bruise/bleed easily.        Chronic comorbid conditions affecting medical decision making today:  Past Medical History:   Diagnosis Date    Allergy     Chronic urticaria 11/12/2014    Colonic polyp     Malignant neoplasm of thyroid gland     thyroid    Obesity     Post-surgical hypothyroidism     PUD (peptic ulcer disease)     Urticaria      Past Surgical History:   Procedure Laterality Date    COLONOSCOPY N/A 2/5/2018    Procedure: COLONOSCOPY;  Surgeon: ABHISHEK Hitchcock MD;  Location: [facility] ENDO (4TH FLR);  Service: Endoscopy;  Laterality: N/A;  confirmed appt.    COLONOSCOPY N/A 1/4/2024    Procedure: COLONOSCOPY;  Surgeon: Bob Hammond MD;  Location: [facility] ENDOSCOPY;  Service: Endoscopy;  Laterality: N/A;  Referred by: Dr. Hitchcock (f/u from c-scope on 2/5/2018) / Prep: Suprep / Route instructions sent: portal.   12/28- pre call complete. [facility]    KNEE SURGERY  8/2013    right knee-includes scope    THYROIDECTOMY       Family History   Problem Relation Name Age of Onset    Breast cancer Mother      Cancer Mother          breast    Breast cancer Sister      Cancer Sister          breast    Heart disease Father  64        MI    Diabetes Neg Hx      Thyroid cancer Neg Hx       Social History     Substance and Sexual Activity   Alcohol Use Yes    Comment: socially     Social History     Tobacco Use   Smoking Status Never   Smokeless Tobacco Never   Tobacco Comments    Smoke cigar every once in a while     Social History     Substance and Sexual Activity   Drug Use No       Current Outpatient Medications:     cetirizine (ZYRTEC) 10 MG tablet, Take 10 mg by mouth., Disp: , Rfl:     EPINEPHrine (EPIPEN) 0.3 mg/0.3 mL AtIn, Inject 0.3 mLs (0.3  mg total) into the muscle once. for 1 dose, Disp: 1 each, Rfl: 0    fluticasone propionate (FLONASE) 50 mcg/actuation nasal spray, 1 spray (50 mcg total) by Each Nostril route 2 (two) times a day., Disp: 16 g, Rfl: 0    Lactobacillus rhamnosus GG (CULTURELLE) 10 billion cell capsule, Take 1 capsule by mouth once daily., Disp: , Rfl:     rosuvastatin (CRESTOR) 5 MG tablet, Take 5 mg by mouth once daily., Disp: , Rfl:     sildenafiL (VIAGRA) 100 MG tablet, Take 1 tablet (100 mg total) by mouth daily as needed., Disp: 30 tablet, Rfl: 11    tamsulosin (FLOMAX) 0.4 mg Cap, Take 1 capsule by mouth., Disp: , Rfl:     TIROSINT 150 mcg Cap, Take 150 mcg by mouth once daily., Disp: 90 capsule, Rfl: 3     Objective:         Vitals:    11/13/24 0806   BP: 138/84   Pulse: 68     Physical Exam   Constitutional: He is oriented to person, place, and time. He appears well-developed.   HENT:   Head: Normocephalic.   Pulmonary/Chest: Effort normal.   Musculoskeletal:         General: No swelling or tenderness.      Cervical back: Neck supple.   Lymphadenopathy:     He has no cervical adenopathy.   Neurological: He is alert and oriented to person, place, and time.   Skin: No rash noted.   No Heliotrope rash  No Gottron papules  No sclerodactyly   No Raynaud's  No Petechiae  No discoid lesions  No alopecia  Normal Capillaroscopy   Vitals reviewed.      Right Side Rheumatological Exam     Muscle Strength (0-5 scale):  Deltoid:  5  Biceps: 5/5   Triceps:  5  : 5/5   Iliopsoas: 5  Quadriceps:  5   Distal Lower Extremity: 5    Left Side Rheumatological Exam     Muscle Strength (0-5 scale):  Deltoid:  5  Biceps: 5/5   Triceps:  5  :  5/5   Iliopsoas: 5  Quadriceps:  5   Distal Lower Extremity: 5          Reviewed old and all outside pertinent medical records available.    All lab results personally reviewed and interpreted by me.  Lab Results   Component Value Date    WBC 4.06 03/01/2023    HGB 12.8 (L) 03/01/2023    HCT 40.3  "03/01/2023    MCV 92 03/01/2023    MCH 29.1 03/01/2023    MCHC 31.8 (L) 03/01/2023    RDW 13.6 03/01/2023     03/01/2023    MPV 10.8 03/01/2023       Lab Results   Component Value Date     08/27/2024    K 4.4 08/27/2024     08/27/2024    CO2 31 (H) 08/27/2024     08/27/2024    BUN 15 08/27/2024    CALCIUM 9.3 08/27/2024    PROT 7.8 08/27/2024    ALBUMIN 4.5 08/27/2024    ALBUMIN 4.5 08/27/2024    BILITOT 0.6 08/27/2024    AST 36 08/27/2024    ALKPHOS 76 08/27/2024    ALT 33 08/27/2024       Lab Results   Component Value Date    COLORU Yellow 06/15/2021    APPEARANCEUA Clear 06/15/2021    SPECGRAV 1.015 06/15/2021    PHUR 5.0 06/15/2021    PROTEINUA Negative 06/15/2021    KETONESU Negative 06/15/2021    LEUKOCYTESUR Negative 06/15/2021    NITRITE Negative 06/15/2021    UROBILINOGEN n 11/01/2018       No results found for: "CRP"    No results found for: "KENNY", "RF", "SEDRATE", "CCPANTIBODIE"    No components found for: "25OHVITDTOT", "91THPIVU4", "30WVFCWB3", "METHODNOTE"    No results found for: "URICACID"    Lab Results   Component Value Date    HEPBSAB <3.00 09/06/2023    HEPBSAB Non-reactive 09/06/2023    HEPBSAG Non-reactive 09/06/2023    HEPBSAG Negative 12/12/2016    HEPCAB Non-reactive 09/06/2023    HEPCAB Negative 12/12/2016    HEPCAB Negative 12/12/2016     Imaging:  All imaging reviewed and independently interpreted by me.     ASSESSMENT / PLAN:     Zaheer Neal is a 64 y.o. Black or  male with:    1. Elevated CK  2. Positive KENNY  -Mild elevated CK in the 200s. KENNY 1:320 homogeneous with negative subserologies  -Normal proximal and distal upper and lower extremity strength.   -could be related to atorvastatin use  -TFT ok. He has post surgical hypothyroidism.   -will investigate for autoimmune/inflammatory etiologies now. Repeat CK and aldolase.   -Follow up depending on lab results.     Method of contact with patient concerns: [facility] " Rheumatology    Disclaimer:  This note is prepared using voice recognition software and as such is likely to have errors and has not been proof read. Please contact me for questions.     Time spent: 30 minutes in face to face discussion concerning diagnosis, prognosis, review of lab and test results, benefits of treatment as well as management of disease, counseling of patient and coordination of care between various health care providers.      Karyna Del Rio M.D.  Rheumatology  [facility]

## 2024-12-03 ENCOUNTER — TELEPHONE (OUTPATIENT)
Dept: INTERNAL MEDICINE | Facility: CLINIC | Age: 64
End: 2024-12-03

## 2024-12-03 ENCOUNTER — TELEPHONE (OUTPATIENT)
Dept: INTERNAL MEDICINE | Facility: CLINIC | Age: 64
End: 2024-12-03
Payer: COMMERCIAL

## 2024-12-03 DIAGNOSIS — R76.8 POSITIVE ANA (ANTINUCLEAR ANTIBODY): Primary | ICD-10-CM

## 2024-12-03 DIAGNOSIS — E78.5 HYPERLIPIDEMIA, UNSPECIFIED HYPERLIPIDEMIA TYPE: ICD-10-CM

## 2024-12-03 DIAGNOSIS — R74.8 ELEVATED CK: ICD-10-CM

## 2024-12-04 NOTE — TELEPHONE ENCOUNTER
Please call patient to schedule for a follow up appointment with me in 3 weeks or so to go over his cholesterol medications and recent testing with the liver and rheumatology specialists. Thank you.

## 2024-12-04 NOTE — TELEPHONE ENCOUNTER
----- Message from Tabby Spence NP sent at 9/10/2024 11:35 AM CDT -----  His repeat liver enzymes are normal and his KENNY is trending down and near normal after stopping the Crestor so they may be related since Crestor can cause an autoimmune type inflammation picture. Let me know if you plan to start a different statin and I can monitor labs after starting.   Thanks!

## 2024-12-31 ENCOUNTER — OFFICE VISIT (OUTPATIENT)
Dept: INTERNAL MEDICINE | Facility: CLINIC | Age: 64
End: 2024-12-31
Attending: FAMILY MEDICINE
Payer: COMMERCIAL

## 2024-12-31 VITALS
BODY MASS INDEX: 33.77 KG/M2 | OXYGEN SATURATION: 97 % | SYSTOLIC BLOOD PRESSURE: 128 MMHG | HEIGHT: 78 IN | HEART RATE: 74 BPM | DIASTOLIC BLOOD PRESSURE: 82 MMHG | WEIGHT: 291.88 LBS

## 2024-12-31 DIAGNOSIS — R74.8 ELEVATED CK: Primary | ICD-10-CM

## 2024-12-31 DIAGNOSIS — E89.0 POST-SURGICAL HYPOTHYROIDISM: ICD-10-CM

## 2024-12-31 DIAGNOSIS — E78.5 HYPERLIPIDEMIA, UNSPECIFIED HYPERLIPIDEMIA TYPE: ICD-10-CM

## 2024-12-31 DIAGNOSIS — K74.00 LIVER FIBROSIS: ICD-10-CM

## 2024-12-31 DIAGNOSIS — R76.8 POSITIVE ANA (ANTINUCLEAR ANTIBODY): ICD-10-CM

## 2024-12-31 DIAGNOSIS — Z13.6 ENCOUNTER FOR SCREENING FOR CARDIOVASCULAR DISORDERS: ICD-10-CM

## 2024-12-31 PROCEDURE — 1159F MED LIST DOCD IN RCRD: CPT | Mod: CPTII,S$GLB,, | Performed by: FAMILY MEDICINE

## 2024-12-31 PROCEDURE — 3079F DIAST BP 80-89 MM HG: CPT | Mod: CPTII,S$GLB,, | Performed by: FAMILY MEDICINE

## 2024-12-31 PROCEDURE — 99999 PR PBB SHADOW E&M-EST. PATIENT-LVL V: CPT | Mod: PBBFAC,,, | Performed by: FAMILY MEDICINE

## 2024-12-31 PROCEDURE — 3008F BODY MASS INDEX DOCD: CPT | Mod: CPTII,S$GLB,, | Performed by: FAMILY MEDICINE

## 2024-12-31 PROCEDURE — 99214 OFFICE O/P EST MOD 30 MIN: CPT | Mod: S$GLB,,, | Performed by: FAMILY MEDICINE

## 2024-12-31 PROCEDURE — 3074F SYST BP LT 130 MM HG: CPT | Mod: CPTII,S$GLB,, | Performed by: FAMILY MEDICINE

## 2024-12-31 PROCEDURE — 1160F RVW MEDS BY RX/DR IN RCRD: CPT | Mod: CPTII,S$GLB,, | Performed by: FAMILY MEDICINE

## 2024-12-31 PROCEDURE — 3044F HG A1C LEVEL LT 7.0%: CPT | Mod: CPTII,S$GLB,, | Performed by: FAMILY MEDICINE

## 2024-12-31 NOTE — ASSESSMENT & PLAN NOTE
-comments from July 15, 2024: noted on hepatology workup circa 2023. Unclear contribution of Crestor. Will discontinue and recheck laboratory in August. Patient was asymptomatic  -rheumatology 11/13/2024. No conclusive recommendation concerning KENNY. Follow-up muscle testing was normal with the exception of a mildly elevated CK.  Unclear disposition regarding statin medication

## 2024-12-31 NOTE — PROGRESS NOTES
Subjective:       Patient ID: Zaheer Neal is a 64 y.o. male.    Chief Complaint: Follow-up    Established patient follows up for management of chronic medical illnesses with complaints today. Please see dictation and ROS for interval problems, specific complaints and disease management discussion.    Past, Surgical, Family, Social, Histories; Medications, allergies reviewed and reconciled.  Health maintenance file reviewed and addressed items due. Recent applicable lab, imaging and cardiovascular results reviewed.  Problem list items reviewed and modified or added entries (in the overview section) may not be transcribed into this encounter note due to note writer format.      Follow-up at my request concerning confusion about CK, lipid management and LFTs.  Hepatologist had concerns about LFT abnormality and KENNY as an autoimmune phenomenon from Crestor.  See 09/10/2024 clinic encounter note.  We noted CK was elevated in July and stopped Crestor at that time.  Patient previously had no complaints of myalgias however today states he does have leg cramps at times.  No muscle weakness is reported.    Referred to rheumatology for positive KENNY and elevated CK.  See clinic note 11/13/2024, no particular recommendation concerning statin medication was made.  Laboratory at that time was mostly normal with the exception of a mildly elevated CK.  Patient states he was told by the rheumatologist that this could be characteristic of large muscular individuals.    Concerning cholesterol naive , placed on Crestor with follow-up LDL 41.  Most recently  after being off Crestor.  CK was a proximally 246 which was similar to before taking off of medication.        Review of Systems   Constitutional:  Negative for appetite change, chills, diaphoresis, fatigue and fever.   HENT:  Negative for congestion, postnasal drip, rhinorrhea, sore throat and trouble swallowing.    Eyes:  Negative for visual disturbance.    Respiratory:  Negative for cough, choking, chest tightness, shortness of breath and wheezing.    Cardiovascular:  Negative for chest pain and leg swelling.   Gastrointestinal:  Negative for abdominal distention, abdominal pain, diarrhea, nausea and vomiting.   Genitourinary:  Negative for difficulty urinating and hematuria.   Musculoskeletal:  Positive for myalgias. Negative for arthralgias.   Skin:  Negative for rash.   Neurological:  Negative for weakness, light-headedness and headaches.   Psychiatric/Behavioral:  Negative for confusion and dysphoric mood.        Objective:      Physical Exam  Vitals and nursing note reviewed.   Constitutional:       General: He is not in acute distress.     Appearance: He is well-developed.   Pulmonary:      Effort: Pulmonary effort is normal.   Musculoskeletal:      Cervical back: Neck supple.      Right lower leg: No edema.      Left lower leg: No edema.   Skin:     General: Skin is warm and dry.      Findings: No rash.   Neurological:      Mental Status: He is alert and oriented to person, place, and time.   Psychiatric:         Behavior: Behavior normal.         Thought Content: Thought content normal.         Judgment: Judgment normal.         Assessment:       1. Elevated CK    2. Liver fibrosis    3. Hyperlipidemia, unspecified hyperlipidemia type    4. Positive KENNY (antinuclear antibody)    5. Encounter for screening for cardiovascular disorders    6. Post-surgical hypothyroidism        Plan:     Medication List with Changes/Refills   Current Medications    CETIRIZINE (ZYRTEC) 10 MG TABLET    Take 10 mg by mouth.    EPINEPHRINE (EPIPEN) 0.3 MG/0.3 ML ATIN    Inject 0.3 mLs (0.3 mg total) into the muscle once. for 1 dose    FLUTICASONE PROPIONATE (FLONASE) 50 MCG/ACTUATION NASAL SPRAY    1 spray (50 mcg total) by Each Nostril route 2 (two) times a day.    LACTOBACILLUS RHAMNOSUS GG (CULTURELLE) 10 BILLION CELL CAPSULE    Take 1 capsule by mouth once daily.     ROSUVASTATIN (CRESTOR) 5 MG TABLET    Take 5 mg by mouth once daily.    SILDENAFIL (VIAGRA) 100 MG TABLET    Take 1 tablet (100 mg total) by mouth daily as needed.    TAMSULOSIN (FLOMAX) 0.4 MG CAP    Take 1 capsule by mouth.    TIROSINT 150 MCG CAP    Take 150 mcg by mouth once daily.     1. Elevated CK  Assessment & Plan:  -comments from July 15, 2024: noted on hepatology workup circa 2023. Unclear contribution of Crestor. Will discontinue and recheck laboratory in August. Patient was asymptomatic  -rheumatology 11/13/2024. No conclusive recommendation concerning KENNY. Follow-up muscle testing was normal with the exception of a mildly elevated CK.  Unclear disposition regarding statin medication      Orders:  -     Ambulatory referral/consult to Cardiology; Future; Expected date: 01/07/2025    2. Liver fibrosis    3. Hyperlipidemia, unspecified hyperlipidemia type  Assessment & Plan:  -comments from July 15, 2024: noted on hepatology workup circa 2023. Unclear contribution of Crestor. Will discontinue and recheck laboratory in August. Patient was asymptomatic    -rheumatology 11/13/2024. No conclusive recommendation concerning KENNY. Follow-up muscle testing was normal with the exception of a mildly elevated CK.  Unclear disposition regarding statin medication     -hepatology nurse practitioner recommends holding statin medication for the time being due to concerns over LFTs and KENNY.    -check calcium score and cardiology consult    Orders:  -     CT Cardiac Scoring; Future; Expected date: 12/31/2024  -     Ambulatory referral/consult to Cardiology; Future; Expected date: 01/07/2025    4. Positive KENNY (antinuclear antibody)  Assessment & Plan:  -hepatology concerned about Crestor causing autoimmune  -rheumatology 11/13/2024.  No conclusive recommendation concerning KENNY.  Follow-up muscle testing was normal with the exception of a mildly elevated CK.      5. Encounter for screening for cardiovascular disorders  -      CT Cardiac Scoring; Future; Expected date: 12/31/2024  -     Ambulatory referral/consult to Cardiology; Future; Expected date: 01/07/2025    6. Post-surgical hypothyroidism  Overview:  -Thyroid cancer 2011, thyroidectomy in [place]  -followed in endocrinology    >>OVERVIEW FOR HYPOTHYROIDISM (ACQUIRED) WRITTEN ON 7/15/2024  8:33 AM BY TRE NUNEZ MD    -Thyroid cancer 2011, thyroidectomy in [place]  -followed in endocrinology    Assessment & Plan:  -managed by Endocrinology, October TSH slightly low.        See meds, orders, follow up, routing and instructions sections of encounter and AVS. Discussed with patient and provided on AVS.    Lab Results   Component Value Date     08/27/2024    K 4.4 08/27/2024     08/27/2024    BUN 15 08/27/2024    CREATININE 1.10 08/27/2024     08/27/2024    HGBA1C 5.6 08/27/2024    AST 36 08/27/2024    AST 50 (H) 04/15/2016    ALT 33 08/27/2024    ALBUMIN 4.5 08/27/2024    ALBUMIN 4.5 08/27/2024    PROT 7.8 08/27/2024    BILITOT 0.6 08/27/2024    CHOL 180 08/27/2024    CHOL 172 09/26/2017    HDL 39 (L) 08/27/2024    LDLCALC 116.6 08/27/2024    TRIG 122 08/27/2024    WBC 4.06 03/01/2023    HGB 12.8 (L) 03/01/2023    HCT 40.3 03/01/2023     03/01/2023    PSA 0.76 02/16/2024    PSADIAG 1.1 01/05/2023    TSH 0.232 (L) 10/24/2024         Confusing aspects to LFTs, CK and cholesterol medication.  Based on opinion from hepatology Clinic will continue to hold Crestor for the time being.  Inconclusive opinion from rheumatology concerning cause and effect.  ASCVD risk score through epic 11.3% today.  Discussed this with patient.  I would like to proceed with the calcium score to better define cardiovascular risk and a cardiology consult to discuss alternative medications.

## 2024-12-31 NOTE — ASSESSMENT & PLAN NOTE
-hepatology concerned about Crestor causing autoimmune  -rheumatology 11/13/2024.  No conclusive recommendation concerning KENNY.  Follow-up muscle testing was normal with the exception of a mildly elevated CK.

## 2025-01-06 ENCOUNTER — HOSPITAL ENCOUNTER (OUTPATIENT)
Dept: RADIOLOGY | Facility: HOSPITAL | Age: 65
Discharge: HOME | End: 2025-01-06
Attending: FAMILY MEDICINE
Payer: COMMERCIAL

## 2025-01-06 DIAGNOSIS — Z13.6 ENCOUNTER FOR SCREENING FOR CARDIOVASCULAR DISORDERS: ICD-10-CM

## 2025-01-06 DIAGNOSIS — E78.5 HYPERLIPIDEMIA, UNSPECIFIED HYPERLIPIDEMIA TYPE: ICD-10-CM

## 2025-01-06 PROCEDURE — 75571 CT HRT W/O DYE W/CA TEST: CPT | Mod: 26,,, | Performed by: RADIOLOGY

## 2025-01-06 PROCEDURE — 75571 CT HRT W/O DYE W/CA TEST: CPT | Mod: TC

## 2025-01-07 ENCOUNTER — OFFICE VISIT (OUTPATIENT)
Dept: CARDIOLOGY | Facility: CLINIC | Age: 65
End: 2025-01-07
Attending: FAMILY MEDICINE
Payer: COMMERCIAL

## 2025-01-07 VITALS
HEART RATE: 59 BPM | HEIGHT: 78 IN | SYSTOLIC BLOOD PRESSURE: 130 MMHG | OXYGEN SATURATION: 98 % | DIASTOLIC BLOOD PRESSURE: 70 MMHG | BODY MASS INDEX: 34.36 KG/M2 | WEIGHT: 296.94 LBS

## 2025-01-07 DIAGNOSIS — E89.0 HISTORY OF THYROIDECTOMY: ICD-10-CM

## 2025-01-07 DIAGNOSIS — Z82.49 FAMILY HISTORY OF PREMATURE CORONARY ARTERY DISEASE: ICD-10-CM

## 2025-01-07 DIAGNOSIS — R74.8 ELEVATED CK: ICD-10-CM

## 2025-01-07 DIAGNOSIS — Z13.6 ENCOUNTER FOR SCREENING FOR CARDIOVASCULAR DISORDERS: ICD-10-CM

## 2025-01-07 DIAGNOSIS — Z85.850 HISTORY OF THYROID CANCER: ICD-10-CM

## 2025-01-07 DIAGNOSIS — E66.811 CLASS 1 OBESITY DUE TO EXCESS CALORIES WITHOUT SERIOUS COMORBIDITY WITH BODY MASS INDEX (BMI) OF 33.0 TO 33.9 IN ADULT: Primary | ICD-10-CM

## 2025-01-07 DIAGNOSIS — E78.5 HYPERLIPIDEMIA, UNSPECIFIED HYPERLIPIDEMIA TYPE: ICD-10-CM

## 2025-01-07 DIAGNOSIS — E66.09 CLASS 1 OBESITY DUE TO EXCESS CALORIES WITHOUT SERIOUS COMORBIDITY WITH BODY MASS INDEX (BMI) OF 33.0 TO 33.9 IN ADULT: Primary | ICD-10-CM

## 2025-01-07 PROCEDURE — 99203 OFFICE O/P NEW LOW 30 MIN: CPT | Mod: S$GLB,,, | Performed by: INTERNAL MEDICINE

## 2025-01-07 PROCEDURE — 3078F DIAST BP <80 MM HG: CPT | Mod: CPTII,S$GLB,, | Performed by: INTERNAL MEDICINE

## 2025-01-07 PROCEDURE — 99999 PR PBB SHADOW E&M-EST. PATIENT-LVL IV: CPT | Mod: PBBFAC,,, | Performed by: INTERNAL MEDICINE

## 2025-01-07 PROCEDURE — 3008F BODY MASS INDEX DOCD: CPT | Mod: CPTII,S$GLB,, | Performed by: INTERNAL MEDICINE

## 2025-01-07 PROCEDURE — 1159F MED LIST DOCD IN RCRD: CPT | Mod: CPTII,S$GLB,, | Performed by: INTERNAL MEDICINE

## 2025-01-07 PROCEDURE — 3075F SYST BP GE 130 - 139MM HG: CPT | Mod: CPTII,S$GLB,, | Performed by: INTERNAL MEDICINE

## 2025-01-07 PROCEDURE — 1160F RVW MEDS BY RX/DR IN RCRD: CPT | Mod: CPTII,S$GLB,, | Performed by: INTERNAL MEDICINE

## 2025-01-07 RX ORDER — EVOLOCUMAB 140 MG/ML
140 INJECTION, SOLUTION SUBCUTANEOUS
Qty: 2 ML | Refills: 3 | Status: ACTIVE | OUTPATIENT
Start: 2025-01-07 | End: 2025-04-29

## 2025-01-07 RX ORDER — EZETIMIBE 10 MG/1
10 TABLET ORAL DAILY
Qty: 90 TABLET | Refills: 3 | Status: SHIPPED | OUTPATIENT
Start: 2025-01-07 | End: 2026-01-07

## 2025-01-07 RX ORDER — EVOLOCUMAB 140 MG/ML
140 INJECTION, SOLUTION SUBCUTANEOUS
Qty: 2 ML | Refills: 3 | Status: SHIPPED | OUTPATIENT
Start: 2025-01-07 | End: 2025-01-07 | Stop reason: SDUPTHER

## 2025-01-07 NOTE — PROGRESS NOTES
Subjective:   Patient ID:  Zaheer Neal is a 64 y.o. male is a new patient who presents for evaluation of Establish Care  Referral for statin management     Established patient follows up for management of chronic medical illnesses with complaints today. Please see dictation and ROS for interval problems, specific complaints and disease management discussion.     Past, Surgical, Family, Social, Histories; Medications, allergies reviewed and reconciled.  Health maintenance file reviewed and addressed items due. Recent applicable lab, imaging and cardiovascular results reviewed.  Problem list items reviewed and modified or added entries (in the overview section) may not be transcribed into this encounter note due to note writer format.        Follow-up at my request concerning confusion about CK, lipid management and LFTs.  Hepatologist had concerns about LFT abnormality and KENNY as an autoimmune phenomenon from Crestor.  See 09/10/2024 clinic encounter note.  We noted CK was elevated in July and stopped Crestor at that time.  Patient previously had no complaints of myalgias however today states he does have leg cramps at times.  No muscle weakness is reported.     Referred to rheumatology for positive KENNY and elevated CK.  See clinic note 11/13/2024, no particular recommendation concerning statin medication was made.  Laboratory at that time was mostly normal with the exception of a mildly elevated CK.  Patient states he was told by the rheumatologist that this could be characteristic of large muscular individuals.     Concerning cholesterol naive , placed on Crestor with follow-up LDL 41.  Most recently  after being off Crestor.  CK was a proximally 246 which was similar to before taking off of medication.    MRI elastography  1. Liver fat fraction measures 3.2%, within normal limits.  2. Liver elasticity measures 2.30 kPa, consistent with normal or F0 fibrosis.  3. Liver iron content  "measures 1.14 mg/g, within normal limits.    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    Patient Active Problem List   Diagnosis    Peptic ulcer    Chronic pain of both knees    History of thyroid cancer    Post-surgical hypothyroidism    Class 1 obesity due to excess calories without serious comorbidity with body mass index (BMI) of 33.0 to 33.9 in adult    Personal history of allergy to shellfish    Hyperlipidemia    Trigger middle finger of right hand    Osteoarthritis of both knees    Benign prostatic hyperplasia    Tinnitus of right ear    Elevated CK    Knee joint stiffness, bilateral    Colon polyps    Liver fibrosis    Positive KENNY (antinuclear antibody)    Plantar fasciitis     /70 (Patient Position: Sitting)   Pulse (!) 59   Ht 6' 6" (1.981 m)   Wt 134.7 kg (296 lb 15.4 oz)   SpO2 98%   BMI 34.32 kg/m²   Body mass index is 34.32 kg/m².  CrCl cannot be calculated (Patient's most recent lab result is older than the maximum 7 days allowed.).    Lab Results   Component Value Date     08/27/2024    K 4.4 08/27/2024     08/27/2024    CO2 31 (H) 08/27/2024    BUN 15 08/27/2024    CREATININE 1.10 08/27/2024     08/27/2024    HGBA1C 5.6 08/27/2024    AST 36 08/27/2024    AST 50 (H) 04/15/2016    ALT 33 08/27/2024    ALBUMIN 4.5 08/27/2024    ALBUMIN 4.5 08/27/2024    PROT 7.8 08/27/2024    BILITOT 0.6 08/27/2024    WBC 4.06 03/01/2023    HGB 12.8 (L) 03/01/2023    HCT 40.3 03/01/2023    MCV 92 03/01/2023     03/01/2023    INR 1.0 09/18/2011    PSA 0.76 02/16/2024    TSH 0.232 (L) 10/24/2024    CHOL 180 08/27/2024    CHOL 172 09/26/2017    HDL 39 (L) 08/27/2024    LDLCALC 116.6 08/27/2024    TRIG 122 08/27/2024       Current Outpatient " Medications   Medication Sig    fluticasone propionate (FLONASE) 50 mcg/actuation nasal spray 1 spray (50 mcg total) by Each Nostril route 2 (two) times a day.    sildenafiL (VIAGRA) 100 MG tablet Take 1 tablet (100 mg total) by mouth daily as needed.    tamsulosin (FLOMAX) 0.4 mg Cap Take 1 capsule by mouth.    TIROSINT 150 mcg Cap Take 150 mcg by mouth once daily.    cetirizine (ZYRTEC) 10 MG tablet Take 10 mg by mouth. (Patient not taking: Reported on 1/7/2025)    EPINEPHrine (EPIPEN) 0.3 mg/0.3 mL AtIn Inject 0.3 mLs (0.3 mg total) into the muscle once. for 1 dose    evolocumab (REPATHA SURECLICK) 140 mg/mL PnIj Inject 1 mL (140 mg total) into the skin every 14 (fourteen) days.    ezetimibe (ZETIA) 10 mg tablet Take 1 tablet (10 mg total) by mouth once daily.    Lactobacillus rhamnosus GG (CULTURELLE) 10 billion cell capsule Take 1 capsule by mouth once daily. (Patient not taking: Reported on 1/7/2025)     No current facility-administered medications for this visit.       Review of Systems   Constitutional: Positive for weight gain. Negative for chills, decreased appetite, malaise/fatigue and night sweats.   Eyes:  Negative for blurred vision, double vision, visual disturbance and visual halos.   Cardiovascular:  Negative for chest pain, claudication, cyanosis, dyspnea on exertion, irregular heartbeat, leg swelling, near-syncope, orthopnea, palpitations, paroxysmal nocturnal dyspnea and syncope.   Respiratory:  Negative for cough, hemoptysis, snoring, sputum production and wheezing.    Endocrine: Negative for cold intolerance, heat intolerance, polydipsia and polyphagia.   Hematologic/Lymphatic: Negative for adenopathy and bleeding problem. Does not bruise/bleed easily.   Skin:  Negative for flushing, itching, poor wound healing and rash.   Musculoskeletal:  Positive for arthritis. Negative for back pain, falls, gout, joint pain, joint swelling, muscle cramps, muscle weakness, myalgias, neck pain and stiffness.    Gastrointestinal:  Negative for bloating, abdominal pain, anorexia, diarrhea, dysphagia, excessive appetite, flatus, hematemesis, jaundice, melena and nausea.   Genitourinary:  Negative for hesitancy and incomplete emptying.   Neurological:  Negative for aphonia, brief paralysis, difficulty with concentration, disturbances in coordination, excessive daytime sleepiness, dizziness, focal weakness, light-headedness, loss of balance and weakness.   Psychiatric/Behavioral:  Negative for altered mental status, depression, hallucinations, hypervigilance, memory loss, substance abuse and suicidal ideas. The patient does not have insomnia and is not nervous/anxious.        Objective:   Physical Exam  Constitutional:       General: He is not in acute distress.     Appearance: He is well-developed. He is not diaphoretic.   HENT:      Head: Normocephalic and atraumatic.      Nose: Nose normal.      Mouth/Throat:      Pharynx: No oropharyngeal exudate.   Eyes:      General: No scleral icterus.        Right eye: No discharge.         Left eye: No discharge.      Conjunctiva/sclera: Conjunctivae normal.      Pupils: Pupils are equal, round, and reactive to light.   Neck:      Thyroid: No thyromegaly.      Vascular: No JVD.      Trachea: No tracheal deviation.   Cardiovascular:      Rate and Rhythm: Normal rate and regular rhythm.      Pulses: Intact distal pulses.      Heart sounds: Normal heart sounds. No murmur heard.     No friction rub. No gallop.   Pulmonary:      Effort: Pulmonary effort is normal. No respiratory distress.      Breath sounds: Normal breath sounds. No stridor. No wheezing or rales.   Chest:      Chest wall: No tenderness.   Abdominal:      General: Bowel sounds are normal. There is no distension.      Palpations: Abdomen is soft. There is no mass.      Tenderness: There is no abdominal tenderness.   Musculoskeletal:         General: No tenderness.      Cervical back: Normal range of motion and neck supple.    Lymphadenopathy:      Cervical: No cervical adenopathy.   Skin:     General: Skin is warm.      Coloration: Skin is not pale.      Findings: No erythema or rash.   Neurological:      Mental Status: He is alert and oriented to person, place, and time.      Cranial Nerves: No cranial nerve deficit.      Motor: No abnormal muscle tone.      Coordination: Coordination normal.      Deep Tendon Reflexes: Reflexes are normal and symmetric. Reflexes normal.   Psychiatric:         Behavior: Behavior normal.         Thought Content: Thought content normal.         Judgment: Judgment normal.         Assessment:     1. Class 1 obesity due to excess calories without serious comorbidity with body mass index (BMI) of 33.0 to 33.9 in adult    2. Elevated CK    3. Hyperlipidemia, unspecified hyperlipidemia type    4. Encounter for screening for cardiovascular disorders    5. Family history of premature coronary artery disease    6. History of thyroid cancer    7. History of thyroidectomy        Plan:   Zaheer was seen today for [facility] care.    Diagnoses and all orders for this visit:    Class 1 obesity due to excess calories without serious comorbidity with body mass index (BMI) of 33.0 to 33.9 in adult    Elevated CK  -     Ambulatory referral/consult to Cardiology    Hyperlipidemia, unspecified hyperlipidemia type  -     Ambulatory referral/consult to Cardiology  -     Discontinue: evolocumab (REPATHA SURECLICK) 140 mg/mL PnIj; Inject 1 mL (140 mg total) into the skin every 14 (fourteen) days.  -     evolocumab (REPATHA SURECLICK) 140 mg/mL PnIj; Inject 1 mL (140 mg total) into the skin every 14 (fourteen) days.  -     Lipid Panel; Future  -     Comprehensive Metabolic Panel; Future    Encounter for screening for cardiovascular disorders  -     Ambulatory referral/consult to Cardiology    Family history of premature coronary artery disease  -     LIPOPROTEIN A (LPA); Future  -     CRP, High Sensitivity; Future    History of  thyroid cancer    History of thyroidectomy    Other orders  -     ezetimibe (ZETIA) 10 mg tablet; Take 1 tablet (10 mg total) by mouth once daily.      Given LFT elevation from statin, will try zetia and PCSK9. Follow up in 5 mo with labs. We discussed mediterranean diet and exercise.  I do not think KENNY positivity is directly related to statin.   Counseled on importance of heart healthy diet low in saturated and trans fat and salt as well gradually starting a regular aerobic exercise regimen with goal of 30min 5x/week. Recommend BP diary. Call if systolic BP > 130 mmHg on checking repeatedly

## 2025-01-12 ENCOUNTER — TELEPHONE (OUTPATIENT)
Dept: INTERNAL MEDICINE | Facility: CLINIC | Age: 65
End: 2025-01-12
Payer: COMMERCIAL

## 2025-01-12 DIAGNOSIS — R74.8 ELEVATED CK: Primary | ICD-10-CM

## 2025-01-12 DIAGNOSIS — E78.5 HYPERLIPIDEMIA, UNSPECIFIED HYPERLIPIDEMIA TYPE: ICD-10-CM

## 2025-02-02 RX ORDER — TAMSULOSIN HYDROCHLORIDE 0.4 MG/1
1 CAPSULE ORAL DAILY
Qty: 90 CAPSULE | Refills: 0 | Status: SHIPPED | OUTPATIENT
Start: 2025-02-02

## 2025-02-02 NOTE — TELEPHONE ENCOUNTER
No care due was identified.  [facility] Embedded Care Due Messages. Reference number: [id number].   2/02/2025 9:50:03 AM CST

## 2025-02-03 NOTE — TELEPHONE ENCOUNTER
Refill Routing Note   Medication(s) are not appropriate for processing by [facility] for the following reason(s):        No active prescription written by provider    [facility] action(s):  Defer               Appointments  past 12m or future 3m with PCP    Date Provider   Last Visit   12/31/2024 Silvino Zuñiga MD   Next Visit   4/30/2025 Silvino Zuñiga MD   ED visits in past 90 days: 0        Note composed:8:58 PM 02/02/2025

## 2025-02-05 RX ORDER — TAMSULOSIN HYDROCHLORIDE 0.4 MG/1
1 CAPSULE ORAL DAILY
Qty: 90 CAPSULE | Refills: 0 | OUTPATIENT
Start: 2025-02-05

## 2025-02-05 NOTE — TELEPHONE ENCOUNTER
Refill Decision Note   Zaheer Neal  is requesting a refill authorization.  Brief Assessment and Rationale for Refill:  Quick Discontinue     Medication Therapy Plan: E-Prescribing Status: Receipt confirmed by pharmacy (2/2/2025  9:27 PM CST)      Comments:     Note composed:10:33 AM 02/05/2025

## 2025-02-05 NOTE — TELEPHONE ENCOUNTER
Refill Decision Note   Zaheer Neal  is requesting a refill authorization.  Brief Assessment and Rationale for Refill:  Quick Discontinue     Medication Therapy Plan:  E-Prescribing Status: Receipt confirmed by pharmacy (2/2/2025  9:27 PM CST)      Comments:     Note composed:12:24 PM 02/05/2025

## 2025-02-05 NOTE — TELEPHONE ENCOUNTER
No care due was identified.  [facility] Embedded Care Due Messages. Reference number: [id number].   2/04/2025 6:59:55 PM CST

## 2025-02-15 ENCOUNTER — RESULTS FOLLOW-UP (OUTPATIENT)
Dept: INTERNAL MEDICINE | Facility: CLINIC | Age: 65
End: 2025-02-15
Payer: COMMERCIAL

## 2025-02-15 DIAGNOSIS — R76.8 POSITIVE ANA (ANTINUCLEAR ANTIBODY): Primary | ICD-10-CM

## 2025-02-15 NOTE — ASSESSMENT & PLAN NOTE
-rheumatologist communication 11/13/2024:    All the autoimmune workup is negative. He did not have any muscle weakness on examination. Besides mildly elevated CK, he does not have any features of inflammatory/autoimmune myositis. Thanks Karyna Hall

## 2025-02-15 NOTE — TELEPHONE ENCOUNTER
----- Message from Karyna Del Rio MD sent at 12/16/2024  4:23 PM CST -----  Hi,   All the autoimmune workup is negative. He did not have any muscle weakness on examination. Besides mildly elevated CK, he does not have any features of inflammatory/autoimmune myositis. Thanks   Karyna Hall  ----- Message -----  From: James, [facility]  Sent: 11/13/2024   9:54 AM CST  To: Karyna Del Rio MD

## 2025-03-03 ENCOUNTER — PATIENT MESSAGE (OUTPATIENT)
Dept: ADMINISTRATIVE | Facility: OTHER | Age: 65
End: 2025-03-03
Payer: COMMERCIAL

## 2025-03-08 ENCOUNTER — RESULTS FOLLOW-UP (OUTPATIENT)
Dept: INTERNAL MEDICINE | Facility: CLINIC | Age: 65
End: 2025-03-08

## 2025-03-08 DIAGNOSIS — R93.1 AGATSTON CAC SCORE 100-199: Primary | ICD-10-CM

## 2025-04-01 ENCOUNTER — PATIENT MESSAGE (OUTPATIENT)
Dept: ADMINISTRATIVE | Facility: OTHER | Age: 65
End: 2025-04-01
Payer: COMMERCIAL

## 2025-04-30 ENCOUNTER — OFFICE VISIT (OUTPATIENT)
Dept: INTERNAL MEDICINE | Facility: CLINIC | Age: 65
End: 2025-04-30
Attending: FAMILY MEDICINE
Payer: COMMERCIAL

## 2025-04-30 ENCOUNTER — LAB VISIT (OUTPATIENT)
Dept: LAB | Facility: HOSPITAL | Age: 65
End: 2025-04-30
Attending: FAMILY MEDICINE
Payer: COMMERCIAL

## 2025-04-30 VITALS
SYSTOLIC BLOOD PRESSURE: 118 MMHG | HEIGHT: 78 IN | DIASTOLIC BLOOD PRESSURE: 72 MMHG | HEART RATE: 69 BPM | BODY MASS INDEX: 33.87 KG/M2 | WEIGHT: 292.75 LBS | OXYGEN SATURATION: 99 %

## 2025-04-30 DIAGNOSIS — E89.0 POST-SURGICAL HYPOTHYROIDISM: ICD-10-CM

## 2025-04-30 DIAGNOSIS — Z00.00 ANNUAL PHYSICAL EXAM: ICD-10-CM

## 2025-04-30 DIAGNOSIS — Z12.5 PROSTATE CANCER SCREENING: ICD-10-CM

## 2025-04-30 DIAGNOSIS — K74.00 LIVER FIBROSIS: ICD-10-CM

## 2025-04-30 DIAGNOSIS — R93.1 AGATSTON CAC SCORE 100-199: ICD-10-CM

## 2025-04-30 DIAGNOSIS — E78.5 HYPERLIPIDEMIA, UNSPECIFIED HYPERLIPIDEMIA TYPE: ICD-10-CM

## 2025-04-30 DIAGNOSIS — Z00.00 ANNUAL PHYSICAL EXAM: Primary | ICD-10-CM

## 2025-04-30 LAB
ALBUMIN SERPL BCP-MCNC: 3.8 G/DL (ref 3.5–5.2)
ALP SERPL-CCNC: 75 UNIT/L (ref 40–150)
ALT SERPL W/O P-5'-P-CCNC: 51 UNIT/L (ref 10–44)
ANION GAP (OHS): 7 MMOL/L (ref 8–16)
AST SERPL-CCNC: 43 UNIT/L (ref 11–45)
BILIRUB SERPL-MCNC: 0.6 MG/DL (ref 0.1–1)
BUN SERPL-MCNC: 16 MG/DL (ref 8–23)
CALCIUM SERPL-MCNC: 8.7 MG/DL (ref 8.7–10.5)
CHLORIDE SERPL-SCNC: 106 MMOL/L (ref 95–110)
CHOLEST SERPL-MCNC: 103 MG/DL (ref 120–199)
CHOLEST/HDLC SERPL: 2.3 {RATIO} (ref 2–5)
CO2 SERPL-SCNC: 28 MMOL/L (ref 23–29)
CREAT SERPL-MCNC: 1.1 MG/DL (ref 0.5–1.4)
GFR SERPLBLD CREATININE-BSD FMLA CKD-EPI: >60 ML/MIN/1.73/M2
GLUCOSE SERPL-MCNC: 83 MG/DL (ref 70–110)
HDLC SERPL-MCNC: 44 MG/DL (ref 40–75)
HDLC SERPL: 42.7 % (ref 20–50)
LDLC SERPL CALC-MCNC: 39.4 MG/DL (ref 63–159)
NONHDLC SERPL-MCNC: 59 MG/DL
POTASSIUM SERPL-SCNC: 4.4 MMOL/L (ref 3.5–5.1)
PROT SERPL-MCNC: 7.1 GM/DL (ref 6–8.4)
PSA SERPL-MCNC: 0.75 NG/ML
SODIUM SERPL-SCNC: 141 MMOL/L (ref 136–145)
TRIGL SERPL-MCNC: 98 MG/DL (ref 30–150)

## 2025-04-30 PROCEDURE — 3078F DIAST BP <80 MM HG: CPT | Mod: CPTII,S$GLB,, | Performed by: FAMILY MEDICINE

## 2025-04-30 PROCEDURE — 99999 PR PBB SHADOW E&M-EST. PATIENT-LVL IV: CPT | Mod: PBBFAC,,, | Performed by: FAMILY MEDICINE

## 2025-04-30 PROCEDURE — 99396 PREV VISIT EST AGE 40-64: CPT | Mod: S$GLB,,, | Performed by: FAMILY MEDICINE

## 2025-04-30 PROCEDURE — 3008F BODY MASS INDEX DOCD: CPT | Mod: CPTII,S$GLB,, | Performed by: FAMILY MEDICINE

## 2025-04-30 PROCEDURE — 82465 ASSAY BLD/SERUM CHOLESTEROL: CPT

## 2025-04-30 PROCEDURE — 36415 COLL VENOUS BLD VENIPUNCTURE: CPT

## 2025-04-30 PROCEDURE — 1160F RVW MEDS BY RX/DR IN RCRD: CPT | Mod: CPTII,S$GLB,, | Performed by: FAMILY MEDICINE

## 2025-04-30 PROCEDURE — 84153 ASSAY OF PSA TOTAL: CPT

## 2025-04-30 PROCEDURE — 1159F MED LIST DOCD IN RCRD: CPT | Mod: CPTII,S$GLB,, | Performed by: FAMILY MEDICINE

## 2025-04-30 PROCEDURE — 80053 COMPREHEN METABOLIC PANEL: CPT

## 2025-04-30 PROCEDURE — 3074F SYST BP LT 130 MM HG: CPT | Mod: CPTII,S$GLB,, | Performed by: FAMILY MEDICINE

## 2025-04-30 RX ORDER — EVOLOCUMAB 140 MG/ML
140 INJECTION, SOLUTION SUBCUTANEOUS
Qty: 2 ML | Refills: 3 | Status: ACTIVE | OUTPATIENT
Start: 2025-04-30 | End: 2025-08-20

## 2025-04-30 RX ORDER — MULTIVITAMIN
1 TABLET ORAL DAILY
COMMUNITY

## 2025-04-30 RX ORDER — MULTIVITAMIN WITH IRON
TABLET ORAL DAILY
COMMUNITY

## 2025-04-30 NOTE — PROGRESS NOTES
Subjective:       Patient ID: Zaheer Neal is a 64 y.o. male.    Chief Complaint: Annual Exam    Established patient for an annual wellness check/physical exam and also chronic disease management. Specific complaints - see dictation, M*model entries and please see ROS.  Past, Surgical, Family, Social Histories; Medications, Allergies reviewed and reconciled.  Health maintenance file reviewed and addressed items due. Recent applicable lab, imaging and cardiovascular results reviewed.  Problem list items reviewed and modified or added entries (in the overview section) may not be transcribed into this encounter note due to note writer format.      No c/o today.    Recent visit concerning confusion about CK, lipid management and LFTs.  Hepatologist had concerns about LFT abnormality and KENNY as an autoimmune phenomenon from Crestor.  See 09/10/2024 clinic encounter note.  We noted CK was elevated in July and stopped Crestor at that time.  Patient previously had no complaints of myalgias however today states he does have leg cramps at times.  No muscle weakness is reported.     Referred to rheumatology for positive KENNY and elevated CK.  See clinic note 11/13/2024, no particular recommendation concerning statin medication was made.  Laboratory at that time was mostly normal with the exception of a mildly elevated CK.  Patient states he was told by the rheumatologist that this could be characteristic of large muscular individuals.     Concerning cholesterol naive , placed on Crestor with follow-up LDL 41.  Most recently  after being off Crestor.  CK was a proximally 246 which was similar to before taking off of medication. Cardiology recently started repatha in addition to zetia.              Review of Systems   Constitutional:  Negative for appetite change, chills, diaphoresis, fatigue and fever.   HENT:  Negative for congestion, postnasal drip, rhinorrhea, sore throat and trouble swallowing.     Eyes:  Negative for visual disturbance.   Respiratory:  Negative for cough, choking, chest tightness, shortness of breath and wheezing.    Cardiovascular:  Negative for chest pain and leg swelling.   Gastrointestinal:  Negative for abdominal distention, abdominal pain, diarrhea, nausea and vomiting.   Genitourinary:  Negative for difficulty urinating and hematuria.   Musculoskeletal:  Negative for arthralgias and myalgias.   Skin:  Negative for rash.   Neurological:  Negative for weakness, light-headedness and headaches.   Psychiatric/Behavioral:  Negative for confusion and dysphoric mood.        Objective:      Physical Exam  Vitals and nursing note reviewed.   Constitutional:       Appearance: He is well-developed. He is not diaphoretic.   Eyes:      General: No scleral icterus.  Neck:      Thyroid: No thyromegaly.      Vascular: No JVD.      Trachea: No tracheal deviation.   Cardiovascular:      Rate and Rhythm: Normal rate and regular rhythm.      Heart sounds: Normal heart sounds. No murmur heard.     No friction rub. No gallop.   Pulmonary:      Effort: Pulmonary effort is normal. No respiratory distress.      Breath sounds: Normal breath sounds. No wheezing or rales.   Abdominal:      General: There is no distension.      Palpations: Abdomen is soft. There is no mass.      Tenderness: There is no abdominal tenderness. There is no guarding or rebound.   Musculoskeletal:      Cervical back: Normal range of motion and neck supple.   Lymphadenopathy:      Cervical: No cervical adenopathy.   Skin:     General: Skin is warm and dry.      Findings: No erythema or rash.   Neurological:      Mental Status: He is alert and oriented to person, place, and time.      Cranial Nerves: No cranial nerve deficit.      Motor: No tremor.      Coordination: Coordination normal.      Gait: Gait normal.   Psychiatric:         Behavior: Behavior normal.         Thought Content: Thought content normal.         Judgment: Judgment  normal.         Assessment:       1. Annual physical exam    2. Agatston CAC score 100-199    3. Hyperlipidemia, unspecified hyperlipidemia type    4. Post-surgical hypothyroidism    5. Liver fibrosis    6. Prostate cancer screening        Plan:     Medication List with Changes/Refills   Current Medications    CALCIUM CIT/MAG/D3/ZN//PIPPA (CALCIUM CITRATE PLUS ORAL)    Take by mouth.    CETIRIZINE (ZYRTEC) 10 MG TABLET    Take 10 mg by mouth.    CYANOCOBALAMIN/THIAMINE HCL (HALEY-B-12 ORAL)    Take by mouth.    EPINEPHRINE (EPIPEN) 0.3 MG/0.3 ML ATIN    Inject 0.3 mLs (0.3 mg total) into the muscle once. for 1 dose    EZETIMIBE (ZETIA) 10 MG TABLET    Take 1 tablet (10 mg total) by mouth once daily.    FLUTICASONE PROPIONATE (FLONASE) 50 MCG/ACTUATION NASAL SPRAY    1 spray (50 mcg total) by Each Nostril route 2 (two) times a day.    LACTOBACILLUS RHAMNOSUS GG (CULTURELLE) 10 BILLION CELL CAPSULE    Take 1 capsule by mouth once daily.    MULTIVITAMIN (THERAGRAN) PER TABLET    Take 1 tablet by mouth once daily.    OMEGA-3 FATTY ACIDS/FISH OIL (FISH OIL-OMEGA-3 FATTY ACIDS) 300-1,000 MG CAPSULE    Take by mouth once daily.    SILDENAFIL (VIAGRA) 100 MG TABLET    Take 1 tablet (100 mg total) by mouth daily as needed.    TAMSULOSIN (FLOMAX) 0.4 MG CAP    Take 1 capsule (0.4 mg total) by mouth once daily.    TIROSINT 150 MCG CAP    Take 150 mcg by mouth once daily.     1. Annual physical exam  -     Comprehensive Metabolic Panel; Future; Expected date: 04/30/2025  -     Lipid Panel; Future; Expected date: 04/30/2025  -     PSA, Screening; Future; Expected date: 04/30/2025    2. Agatston CAC score 100-199  Overview:  -174 on 1/6/2025, on repatha  -followed by cardiology  -mild prior elevation of CK undetermined significance      3. Hyperlipidemia, unspecified hyperlipidemia type  -     Comprehensive Metabolic Panel; Future; Expected date: 04/30/2025  -     Lipid Panel; Future; Expected date: 04/30/2025    4.  Post-surgical hypothyroidism  Overview:  -Thyroid cancer 2011, thyroidectomy in [place]  -followed in endocrinology    >>OVERVIEW FOR HYPOTHYROIDISM (ACQUIRED) WRITTEN ON 7/15/2024  8:33 AM BY TRE NUNEZ MD    -Thyroid cancer 2011, thyroidectomy in [place]  -followed in endocrinology      5. Liver fibrosis  Overview:  -hepatology 9/24/2024      6. Prostate cancer screening  -     PSA, Screening; Future; Expected date: 04/30/2025      See meds, orders, follow up, routing and instructions sections of encounter and AVS. Discussed with patient and provided on AVS.    Discussed diet and exercise as therapeutic modalities for metabolic and other conditions. Provided patient information, which are included as links on the AVS for detailed information.    Lab Results   Component Value Date     08/27/2024    K 4.4 08/27/2024     08/27/2024    BUN 15 08/27/2024    CREATININE 1.10 08/27/2024     08/27/2024    HGBA1C 5.6 08/27/2024    AST 36 08/27/2024    AST 50 (H) 04/15/2016    ALT 33 08/27/2024    ALBUMIN 4.5 08/27/2024    ALBUMIN 4.5 08/27/2024    PROT 7.8 08/27/2024    BILITOT 0.6 08/27/2024    CHOL 180 08/27/2024    CHOL 172 09/26/2017    HDL 39 (L) 08/27/2024    LDLCALC 116.6 08/27/2024    TRIG 122 08/27/2024    WBC 4.06 03/01/2023    HGB 12.8 (L) 03/01/2023    HCT 40.3 03/01/2023     03/01/2023    PSA 0.76 02/16/2024    PSADIAG 1.1 01/05/2023    TSH 0.232 (L) 10/24/2024

## 2025-05-01 ENCOUNTER — RESULTS FOLLOW-UP (OUTPATIENT)
Dept: INTERNAL MEDICINE | Facility: CLINIC | Age: 65
End: 2025-05-01

## 2025-05-01 NOTE — TELEPHONE ENCOUNTER
Refill Routing Note   Medication(s) are not appropriate for processing by [facility] for the following reason(s):        New or recently adjusted medication    [facility] action(s):  Defer               Appointments  past 12m or future 3m with PCP    Date Provider   Last Visit   4/30/2025 Silvino Zuñiga MD   Next Visit   Visit date not found Silvino Zuñiga MD   ED visits in past 90 days: 0        Note composed:9:26 AM 05/01/2025

## 2025-05-01 NOTE — TELEPHONE ENCOUNTER
No care due was identified.  [facility] Embedded Care Due Messages. Reference number: [id number].   5/01/2025 9:05:34 AM CDT

## 2025-05-02 RX ORDER — TAMSULOSIN HYDROCHLORIDE 0.4 MG/1
CAPSULE ORAL
Qty: 90 CAPSULE | Refills: 0 | Status: SHIPPED | OUTPATIENT
Start: 2025-05-02

## 2025-05-11 ENCOUNTER — PATIENT MESSAGE (OUTPATIENT)
Dept: INTERNAL MEDICINE | Facility: CLINIC | Age: 65
End: 2025-05-11
Payer: COMMERCIAL

## 2025-05-15 ENCOUNTER — OFFICE VISIT (OUTPATIENT)
Dept: UROLOGY | Facility: CLINIC | Age: 65
End: 2025-05-15
Payer: COMMERCIAL

## 2025-05-15 VITALS
HEIGHT: 78 IN | BODY MASS INDEX: 33.87 KG/M2 | HEART RATE: 64 BPM | SYSTOLIC BLOOD PRESSURE: 130 MMHG | DIASTOLIC BLOOD PRESSURE: 75 MMHG | WEIGHT: 292.75 LBS

## 2025-05-15 DIAGNOSIS — N40.1 BPH WITH URINARY OBSTRUCTION: Primary | ICD-10-CM

## 2025-05-15 DIAGNOSIS — N13.8 BPH WITH URINARY OBSTRUCTION: Primary | ICD-10-CM

## 2025-05-15 PROCEDURE — 99214 OFFICE O/P EST MOD 30 MIN: CPT | Mod: S$GLB,,,

## 2025-05-15 PROCEDURE — 99999 PR PBB SHADOW E&M-EST. PATIENT-LVL III: CPT | Mod: PBBFAC,,,

## 2025-05-15 PROCEDURE — 3075F SYST BP GE 130 - 139MM HG: CPT | Mod: CPTII,S$GLB,,

## 2025-05-15 PROCEDURE — 3008F BODY MASS INDEX DOCD: CPT | Mod: CPTII,S$GLB,,

## 2025-05-15 PROCEDURE — 1101F PT FALLS ASSESS-DOCD LE1/YR: CPT | Mod: CPTII,S$GLB,,

## 2025-05-15 PROCEDURE — 1159F MED LIST DOCD IN RCRD: CPT | Mod: CPTII,S$GLB,,

## 2025-05-15 PROCEDURE — 3078F DIAST BP <80 MM HG: CPT | Mod: CPTII,S$GLB,,

## 2025-05-15 PROCEDURE — 3288F FALL RISK ASSESSMENT DOCD: CPT | Mod: CPTII,S$GLB,,

## 2025-05-15 RX ORDER — TAMSULOSIN HYDROCHLORIDE 0.4 MG/1
0.4 CAPSULE ORAL DAILY
Qty: 90 CAPSULE | Refills: 3 | Status: SHIPPED | OUTPATIENT
Start: 2025-05-15 | End: 2026-05-15

## 2025-05-15 NOTE — PROGRESS NOTES
CHIEF COMPLAINT:  Annual       HISTORY OF PRESENTING ILLINESS:  Zaheer Neal is a 65 y.o. male is an established patient with the Urology dept. He was last seen by Dr Ocasio 2/2024. Today he is here to review his most recent PSA which was 0.75. He has a few cousins that have had prostate cancer. He reports nocturia x 1 unless he is drinking alcohol which will increase to 5 times. He has taken Viagra in the past but no longer needs it. He's able to get an erection on his own. He denies LUTS. He denies fever, chills, and hematuria. He currently takes Flomax daily.           REVIEW OF SYSTEMS:  Review of Systems   Constitutional:  Negative for chills, malaise/fatigue and weight loss.   Gastrointestinal:  Negative for abdominal pain, nausea and vomiting.   Genitourinary:  Negative for dysuria, flank pain, frequency, hematuria and urgency.         PATIENT HISTORY:    Past Medical History:   Diagnosis Date    Allergy     Chronic urticaria 11/12/2014    Colonic polyp     Malignant neoplasm of thyroid gland     thyroid    Obesity     Post-surgical hypothyroidism     PUD (peptic ulcer disease)     Urticaria        Past Surgical History:   Procedure Laterality Date    COLONOSCOPY N/A 2/5/2018    Procedure: COLONOSCOPY;  Surgeon: ABHISHEK Hitchcock MD;  Location: [address]);  Service: Endoscopy;  Laterality: N/A;  confirmed appt.    COLONOSCOPY N/A 1/4/2024    Procedure: COLONOSCOPY;  Surgeon: Bob Hammond MD;  Location: [facility];  Service: Endoscopy;  Laterality: N/A;  Referred by: Dr. Hitchcock (f/u from c-scope on 2/5/2018) / Prep: Suprep / Route instructions sent: portal.   12/28- pre call complete. [facility]    KNEE SURGERY  8/2013    right knee-includes scope    THYROIDECTOMY         Family History   Problem Relation Name Age of Onset    Breast cancer Mother      Cancer Mother          breast    Breast cancer Sister      Cancer Sister          breast    Heart disease Father  64        MI    Diabetes  Neg Hx      Thyroid cancer Neg Hx         Social History[1]    Allergies:  Shellfish containing products and Crestor [rosuvastatin]    Medications:  Current Medications[2]    PHYSICAL EXAMINATION:  Physical Exam  Vitals reviewed.   Constitutional:       General: He is awake.      Appearance: Normal appearance.   HENT:      Head: Normocephalic.   Pulmonary:      Effort: Pulmonary effort is normal. No respiratory distress.   Skin:     General: Skin is warm and dry.      Capillary Refill: Capillary refill takes less than 2 seconds.   Neurological:      General: No focal deficit present.      Mental Status: He is alert.   Psychiatric:         Mood and Affect: Mood normal.         Behavior: Behavior normal. Behavior is cooperative.         Thought Content: Thought content normal.               LABS:            Lab Results   Component Value Date    PSA 0.75 04/30/2025    PSA 0.76 02/16/2024    PSA 1.5 05/15/2023    PSADIAG 1.1 01/05/2023    PSADIAG 0.97 06/13/2022    PSADIAG 0.71 12/09/2021       Lab Results   Component Value Date    CREATININE 1.1 04/30/2025    EGFRNORACEVR >60 04/30/2025               IMPRESSION:    Encounter Diagnoses   Name Primary?    BPH with urinary obstruction Yes         Assessment:       1. BPH with urinary obstruction        Plan:       -Refilled Flomax for a year  -RTc in 1 year or sooner if needed    I spent a total of 30 minutes on the day of the visit. This includes face to face time and non-face to face time preparing to see the patient (eg, review of tests), obtaining and/or reviewing separately obtained history, documenting clinical information in the electronic or other health record, independently interpreting results and communicating results to the patient/family/caregiver, or care coordinator  Reviewed the possible contributory factors for LUTS.        ESA Grigsby         [1]   Social History  Socioeconomic History    Marital status:      Spouse name: Aubrie    Number of  children: 2   Tobacco Use    Smoking status: Never    Smokeless tobacco: Never    Tobacco comments:     Smoke cigar every once in a while   Substance and Sexual Activity    Alcohol use: Yes     Comment: socially    Drug use: No    Sexual activity: Yes     Partners: Female   Social History Narrative    RM x 3, 2 kids,  Thom [place]arrow, played [employer], [place], [employer], etc. [employer] for college. Born in [facility]     Social Drivers of Health     Financial Resource Strain: Low Risk  (2/20/2024)    Overall Financial Resource Strain (CARDIA)     Difficulty of Paying Living Expenses: Not hard at all   Food Insecurity: No Food Insecurity (2/20/2024)    Hunger Vital Sign     Worried About Running Out of Food in the Last Year: Never true     Ran Out of Food in the Last Year: Never true   Transportation Needs: No Transportation Needs (2/20/2024)    PRAPARE - Transportation     Lack of Transportation (Medical): No     Lack of Transportation (Non-Medical): No   Physical Activity: Insufficiently Active (2/20/2024)    Exercise Vital Sign     Days of Exercise per Week: 1 day     Minutes of Exercise per Session: 30 min   Stress: No Stress Concern Present (2/20/2024)    [employer] [place] [employer] - Occupational Stress Questionnaire     Feeling of Stress : Not at all   Housing Stability: Low Risk  (2/20/2024)    Housing Stability Vital Sign     Unable to Pay for Housing in the Last Year: No     Number of Places Lived in the Last Year: 1     Unstable Housing in the Last Year: No   [2]   Current Outpatient Medications:     calcium cit/mag/D3/Zn//singh (CALCIUM CITRATE PLUS ORAL), Take by mouth., Disp: , Rfl:     cyanocobalamin/thiamine HCl (HALEY-B-12 ORAL), Take by mouth., Disp: , Rfl:     evolocumab (REPATHA SURECLICK) 140 mg/mL PnIj, Inject 1 mL (140 mg total) into the skin every 14 (fourteen) days., Disp: 2 mL, Rfl: 3    ezetimibe (ZETIA) 10 mg tablet, Take 1 tablet (10 mg total) by mouth once daily., Disp: 90  tablet, Rfl: 3    fluticasone propionate (FLONASE) 50 mcg/actuation nasal spray, 1 spray (50 mcg total) by Each Nostril route 2 (two) times a day., Disp: 16 g, Rfl: 0    Lactobacillus rhamnosus GG (CULTURELLE) 10 billion cell capsule, Take 1 capsule by mouth once daily., Disp: , Rfl:     multivitamin (THERAGRAN) per tablet, Take 1 tablet by mouth once daily., Disp: , Rfl:     omega-3 fatty acids/fish oil (FISH OIL-OMEGA-3 FATTY ACIDS) 300-1,000 mg capsule, Take by mouth once daily., Disp: , Rfl:     TIROSINT 150 mcg Cap, Take 150 mcg by mouth once daily., Disp: 90 capsule, Rfl: 3    cetirizine (ZYRTEC) 10 MG tablet, Take 10 mg by mouth. (Patient not taking: Reported on 5/15/2025), Disp: , Rfl:     EPINEPHrine (EPIPEN) 0.3 mg/0.3 mL AtIn, Inject 0.3 mLs (0.3 mg total) into the muscle once. for 1 dose, Disp: 1 each, Rfl: 0    sildenafiL (VIAGRA) 100 MG tablet, Take 1 tablet (100 mg total) by mouth daily as needed. (Patient not taking: Reported on 4/30/2025), Disp: 30 tablet, Rfl: 11    tamsulosin (FLOMAX) 0.4 mg Cap, Take 1 capsule (0.4 mg total) by mouth once daily., Disp: 90 capsule, Rfl: 3

## 2025-06-04 ENCOUNTER — LAB VISIT (OUTPATIENT)
Dept: LAB | Facility: HOSPITAL | Age: 65
End: 2025-06-04
Attending: INTERNAL MEDICINE
Payer: COMMERCIAL

## 2025-06-04 ENCOUNTER — PATIENT MESSAGE (OUTPATIENT)
Dept: ADMINISTRATIVE | Facility: OTHER | Age: 65
End: 2025-06-04
Payer: COMMERCIAL

## 2025-06-04 DIAGNOSIS — Z82.49 FAMILY HISTORY OF PREMATURE CORONARY ARTERY DISEASE: ICD-10-CM

## 2025-06-04 DIAGNOSIS — E78.5 HYPERLIPIDEMIA, UNSPECIFIED HYPERLIPIDEMIA TYPE: ICD-10-CM

## 2025-06-04 LAB
ALBUMIN SERPL BCP-MCNC: 4.1 G/DL (ref 3.5–5.2)
ALP SERPL-CCNC: 68 UNIT/L (ref 38–126)
ALT SERPL W/O P-5'-P-CCNC: 29 UNIT/L (ref 10–44)
ANION GAP (OHS): 10 MMOL/L (ref 8–16)
AST SERPL-CCNC: 37 UNIT/L (ref 15–46)
BILIRUB SERPL-MCNC: 0.4 MG/DL (ref 0.1–1)
BUN SERPL-MCNC: 15 MG/DL (ref 2–20)
CALCIUM SERPL-MCNC: 8.8 MG/DL (ref 8.7–10.5)
CHLORIDE SERPL-SCNC: 105 MMOL/L (ref 95–110)
CHOLEST SERPL-MCNC: 76 MG/DL (ref 120–199)
CHOLEST/HDLC SERPL: 1.8 {RATIO} (ref 2–5)
CO2 SERPL-SCNC: 28 MMOL/L (ref 23–29)
CREAT SERPL-MCNC: 1 MG/DL (ref 0.5–1.4)
CRP SERPL-MCNC: 3.81 MG/L
GFR SERPLBLD CREATININE-BSD FMLA CKD-EPI: >60 ML/MIN/1.73/M2
GLUCOSE SERPL-MCNC: 111 MG/DL (ref 70–110)
HDLC SERPL-MCNC: 43 MG/DL (ref 40–75)
HDLC SERPL: 56.6 % (ref 20–50)
LDLC SERPL CALC-MCNC: 20.4 MG/DL (ref 63–159)
NONHDLC SERPL-MCNC: 33 MG/DL
POTASSIUM SERPL-SCNC: 4.3 MMOL/L (ref 3.5–5.1)
PROT SERPL-MCNC: 6.9 GM/DL (ref 6–8.4)
SODIUM SERPL-SCNC: 143 MMOL/L (ref 136–145)
TRIGL SERPL-MCNC: 63 MG/DL (ref 30–150)

## 2025-06-04 PROCEDURE — 86141 C-REACTIVE PROTEIN HS: CPT | Mod: PN

## 2025-06-04 PROCEDURE — 82565 ASSAY OF CREATININE: CPT | Mod: PN

## 2025-06-04 PROCEDURE — 80061 LIPID PANEL: CPT | Mod: PN

## 2025-06-04 PROCEDURE — 36415 COLL VENOUS BLD VENIPUNCTURE: CPT | Mod: PN

## 2025-06-04 PROCEDURE — 83695 ASSAY OF LIPOPROTEIN(A): CPT | Mod: PN

## 2025-06-09 LAB — W LP(A): 9 NMOL/L

## 2025-06-12 ENCOUNTER — RESULTS FOLLOW-UP (OUTPATIENT)
Dept: CARDIOLOGY | Facility: CLINIC | Age: 65
End: 2025-06-12

## 2025-07-01 NOTE — PROGRESS NOTES
Subjective:      Patient ID: Zaheer Neal is a 65 y.o. male.    Chief Complaint:  Hypothyroidism    History of Present Illness  Zaheer Neal is here for follow up of Postsurgical Hypothyroidism.  Previously seen by me 7/2024.    With regards to Hypothyroidism:    Thyroid cancer 12/2011 diagnosed in [place].   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 Thyroid gland is surgically absent with no residual/recurrent thyroid tissue identified.  2. No abnormal lymph nodes are identified.  RECOMMENDATIONS:  Follow-up ultrasound in 2-3 years is recommended.     Latest Reference Range & Units 08/27/24 09:55   Thyroglobulin Antibody Screen <1.8 IU/mL <1.8   Thyroglobulin, Tumor Marker ng/mL <0.1       Current Medication:  Tirosint 150mcg -- 1 tablet Monday-Saturday, SKIP Sunday     Calcium (800mg daily)/Iron: Yes - takes >4 hours from thyroid replacement.     Biotin Use: Denies    Takes thyroid medication properly without food first thing in the morning.    LT4, Synthroid - Hives    Current Symptoms:   Denies palpitations, unexplained weight changes, fatigue, BM changes, hair loss, brittle nails, heat/ cold intolerance    With regards to Vitamin D Deficiency:    Vit D, 25-Hydroxy   Date Value Ref Range Status   08/27/2024 36 30 - 96 ng/mL Final     Comment:     Vitamin D deficiency.........<10 ng/mL                              Vitamin D insufficiency......10-29 ng/mL       Vitamin D sufficiency........> or equal to 30 ng/mL  Vitamin D toxicity............>100 ng/mL         Current Meds: OTC Vit D3 2000iu daily     With regards to Obesity:    Denies  "history of pancreatitis & personal/family history of medullary thyroid cancer.     Review of Systems   as above      Visit Vitals  BP (!) 144/80 (BP Location: Left arm, Patient Position: Sitting)   Pulse 62   Resp 18   Ht 6' 6" (1.981 m)   Wt 133.7 kg (294 lb 13.8 oz)   BMI 34.08 kg/m²         Body mass index is 34.08 kg/m².    Lab Review:   Lab Results   Component Value Date    HGBA1C 5.6 08/27/2024     Lab Results   Component Value Date    CHOL 76 (L) 06/04/2025    HDL 43 06/04/2025    LDLCALC 20.4 (L) 06/04/2025    TRIG 63 06/04/2025    CHOLHDL 56.6 (H) 06/04/2025     Lab Results   Component Value Date     06/04/2025    K 4.3 06/04/2025     06/04/2025    CO2 28 06/04/2025     (H) 06/04/2025    BUN 15 06/04/2025    CREATININE 1.0 06/04/2025    CALCIUM 8.8 06/04/2025    PROT 6.9 06/04/2025    ALBUMIN 4.1 06/04/2025    BILITOT 0.4 06/04/2025    ALKPHOS 68 06/04/2025    AST 37 06/04/2025    ALT 29 06/04/2025    ANIONGAP 10 06/04/2025    ESTGFRAFRICA >60.0 06/13/2022    EGFRNONAA >60.0 06/13/2022    TSH 0.232 (L) 10/24/2024     Vit D, 25-Hydroxy   Date Value Ref Range Status   08/27/2024 36 30 - 96 ng/mL Final     Comment:     Vitamin D deficiency.........<10 ng/mL                              Vitamin D insufficiency......10-29 ng/mL       Vitamin D sufficiency........> or equal to 30 ng/mL  Vitamin D toxicity............>100 ng/mL       Assessment and Plan     1. Post-surgical hypothyroidism  Hemoglobin A1C    TSH    Comprehensive Metabolic Panel    Vitamin D    Thyroglobulin    PTH, Intact    TIROSINT 150 mcg Cap      2. History of thyroid cancer  Hemoglobin A1C    TSH    Comprehensive Metabolic Panel    Vitamin D    Thyroglobulin      3. Malignant neoplasm of thyroid gland  TIROSINT 150 mcg Cap      4. Class 1 obesity due to excess calories without serious comorbidity with body mass index (BMI) of 33.0 to 33.9 in adult  tirzepatide, weight loss, (ZEPBOUND) 2.5 mg/0.5 mL PnIj      5. Vitamin D " deficiency          Post-surgical hypothyroidism  -- Labs now.  -- Calcium and iron need to be  from thyroid hormone replacement by 4 or > hours.  -- Please note: if you are taking biotin please hold it for 5 days prior to labs as it can interfere with the thyroid testing.  -- Medication Changes:   Tirosint 150mcg -- 1 tablet Monday-Saturday, SKIP Sunday   -- Clinically and biochemically euthyroid.  -- Goal is a low normal TSH.  -- Avoid exogenous hyperthyroidism as this can accelerate bone loss and increase risk of CV complications.  -- Advised to take LT4 on an empty stomach with water and to wait 30-45 minutes before eating or taking other medications   -- Reviewed usual times of thyroid hormone changes  -- Reviewed that symptoms of hypothyroidism may not correlate with tsh, and a normal TSH is the goal of therapy. Symptoms are not a justification for over treatment.    History of thyroid cancer  -- Tg due now.  -- Repeat thyroid US if there is a change in Tg or in 2026.  -- 2011 Thyroidectomy, 2012 ARIZA.  -- DEBORAH intermediate risk given extra thyroidal extension.    Class 1 obesity due to excess calories without serious comorbidity with body mass index (BMI) of 33.0 to 33.9 in adult  -- Patient interested in GLP1. Discussed MOA, SE and insurance coverage.  -- Zepbound 2.5mg weekly.     Vitamin D deficiency  -- Check vitamin D level.        Follow up in about 1 year (around 7/8/2026).      Visit today included increased complexity associated with the care of the problems addressed and managing the longitudinal care of the patient due to the serious and/or complex managed problems.

## 2025-07-04 ENCOUNTER — PATIENT MESSAGE (OUTPATIENT)
Dept: ADMINISTRATIVE | Facility: OTHER | Age: 65
End: 2025-07-04
Payer: COMMERCIAL

## 2025-07-08 ENCOUNTER — OFFICE VISIT (OUTPATIENT)
Dept: ENDOCRINOLOGY | Facility: CLINIC | Age: 65
End: 2025-07-08
Payer: COMMERCIAL

## 2025-07-08 VITALS
BODY MASS INDEX: 34.12 KG/M2 | RESPIRATION RATE: 18 BRPM | HEART RATE: 62 BPM | HEIGHT: 78 IN | DIASTOLIC BLOOD PRESSURE: 80 MMHG | SYSTOLIC BLOOD PRESSURE: 144 MMHG | WEIGHT: 294.88 LBS

## 2025-07-08 DIAGNOSIS — E66.811 CLASS 1 OBESITY DUE TO EXCESS CALORIES WITHOUT SERIOUS COMORBIDITY WITH BODY MASS INDEX (BMI) OF 33.0 TO 33.9 IN ADULT: ICD-10-CM

## 2025-07-08 DIAGNOSIS — C73 MALIGNANT NEOPLASM OF THYROID GLAND: ICD-10-CM

## 2025-07-08 DIAGNOSIS — E66.09 CLASS 1 OBESITY DUE TO EXCESS CALORIES WITHOUT SERIOUS COMORBIDITY WITH BODY MASS INDEX (BMI) OF 33.0 TO 33.9 IN ADULT: ICD-10-CM

## 2025-07-08 DIAGNOSIS — E55.9 VITAMIN D DEFICIENCY: ICD-10-CM

## 2025-07-08 DIAGNOSIS — E89.0 POST-SURGICAL HYPOTHYROIDISM: Primary | ICD-10-CM

## 2025-07-08 DIAGNOSIS — Z85.850 HISTORY OF THYROID CANCER: ICD-10-CM

## 2025-07-08 PROCEDURE — 3008F BODY MASS INDEX DOCD: CPT | Mod: CPTII,S$GLB,, | Performed by: NURSE PRACTITIONER

## 2025-07-08 PROCEDURE — 1101F PT FALLS ASSESS-DOCD LE1/YR: CPT | Mod: CPTII,S$GLB,, | Performed by: NURSE PRACTITIONER

## 2025-07-08 PROCEDURE — 1159F MED LIST DOCD IN RCRD: CPT | Mod: CPTII,S$GLB,, | Performed by: NURSE PRACTITIONER

## 2025-07-08 PROCEDURE — 3079F DIAST BP 80-89 MM HG: CPT | Mod: CPTII,S$GLB,, | Performed by: NURSE PRACTITIONER

## 2025-07-08 PROCEDURE — 3288F FALL RISK ASSESSMENT DOCD: CPT | Mod: CPTII,S$GLB,, | Performed by: NURSE PRACTITIONER

## 2025-07-08 PROCEDURE — G2211 COMPLEX E/M VISIT ADD ON: HCPCS | Mod: S$GLB,,, | Performed by: NURSE PRACTITIONER

## 2025-07-08 PROCEDURE — 3077F SYST BP >= 140 MM HG: CPT | Mod: CPTII,S$GLB,, | Performed by: NURSE PRACTITIONER

## 2025-07-08 PROCEDURE — 99214 OFFICE O/P EST MOD 30 MIN: CPT | Mod: S$GLB,,, | Performed by: NURSE PRACTITIONER

## 2025-07-08 PROCEDURE — 1160F RVW MEDS BY RX/DR IN RCRD: CPT | Mod: CPTII,S$GLB,, | Performed by: NURSE PRACTITIONER

## 2025-07-08 PROCEDURE — 99999 PR PBB SHADOW E&M-EST. PATIENT-LVL IV: CPT | Mod: PBBFAC,,, | Performed by: NURSE PRACTITIONER

## 2025-07-08 RX ORDER — TIRZEPATIDE 2.5 MG/.5ML
2.5 INJECTION, SOLUTION SUBCUTANEOUS
Qty: 4 PEN | Refills: 0 | Status: SHIPPED | OUTPATIENT
Start: 2025-07-08

## 2025-07-08 RX ORDER — LEVOTHYROXINE SODIUM 13 UG/1
150 CAPSULE ORAL DAILY
Qty: 90 CAPSULE | Refills: 3 | Status: SHIPPED | OUTPATIENT
Start: 2025-07-08 | End: 2026-07-08

## 2025-07-08 NOTE — ASSESSMENT & PLAN NOTE
-- Tg due now.  -- Repeat thyroid US if there is a change in Tg or in 2026.  -- 2011 Thyroidectomy, 2012 ARIZA.  -- DEBORAH intermediate risk given extra thyroidal extension.

## 2025-07-08 NOTE — ASSESSMENT & PLAN NOTE
-- Labs now.  -- Calcium and iron need to be  from thyroid hormone replacement by 4 or > hours.  -- Please note: if you are taking biotin please hold it for 5 days prior to labs as it can interfere with the thyroid testing.  -- Medication Changes:   Tirosint 150mcg -- 1 tablet Monday-Saturday, SKIP Sunday   -- Clinically and biochemically euthyroid.  -- Goal is a low normal TSH.  -- Avoid exogenous hyperthyroidism as this can accelerate bone loss and increase risk of CV complications.  -- Advised to take LT4 on an empty stomach with water and to wait 30-45 minutes before eating or taking other medications   -- Reviewed usual times of thyroid hormone changes  -- Reviewed that symptoms of hypothyroidism may not correlate with tsh, and a normal TSH is the goal of therapy. Symptoms are not a justification for over treatment.

## 2025-07-08 NOTE — ASSESSMENT & PLAN NOTE
-- Patient interested in GLP1. Discussed MOA, SE and insurance coverage.  -- Zepbound 2.5mg weekly.

## 2025-07-09 ENCOUNTER — LAB VISIT (OUTPATIENT)
Dept: LAB | Facility: HOSPITAL | Age: 65
End: 2025-07-09
Attending: NURSE PRACTITIONER
Payer: COMMERCIAL

## 2025-07-09 DIAGNOSIS — E89.0 POST-SURGICAL HYPOTHYROIDISM: ICD-10-CM

## 2025-07-09 DIAGNOSIS — Z85.850 HISTORY OF THYROID CANCER: ICD-10-CM

## 2025-07-09 LAB
25(OH)D3+25(OH)D2 SERPL-MCNC: 35 NG/ML (ref 30–96)
ALBUMIN SERPL BCP-MCNC: 4.2 G/DL (ref 3.5–5.2)
ALP SERPL-CCNC: 66 UNIT/L (ref 38–126)
ALT SERPL W/O P-5'-P-CCNC: 31 UNIT/L (ref 10–44)
ANION GAP (OHS): 7 MMOL/L (ref 8–16)
AST SERPL-CCNC: 35 UNIT/L (ref 15–46)
BILIRUB SERPL-MCNC: 0.7 MG/DL (ref 0.1–1)
BUN SERPL-MCNC: 16 MG/DL (ref 2–20)
CALCIUM SERPL-MCNC: 8.6 MG/DL (ref 8.7–10.5)
CHLORIDE SERPL-SCNC: 105 MMOL/L (ref 95–110)
CO2 SERPL-SCNC: 30 MMOL/L (ref 23–29)
CREAT SERPL-MCNC: 1.1 MG/DL (ref 0.5–1.4)
EAG (OHS): 123 MG/DL (ref 68–131)
GFR SERPLBLD CREATININE-BSD FMLA CKD-EPI: >60 ML/MIN/1.73/M2
GLUCOSE SERPL-MCNC: 104 MG/DL (ref 70–110)
HBA1C MFR BLD: 5.9 % (ref 4–5.6)
POTASSIUM SERPL-SCNC: 4.4 MMOL/L (ref 3.5–5.1)
PROT SERPL-MCNC: 7.3 GM/DL (ref 6–8.4)
PTH-INTACT SERPL-MCNC: 66 PG/ML (ref 9–77)
SODIUM SERPL-SCNC: 142 MMOL/L (ref 136–145)
TSH SERPL-ACNC: 0.88 UIU/ML (ref 0.4–4)

## 2025-07-09 PROCEDURE — 86800 THYROGLOBULIN ANTIBODY: CPT | Mod: PN

## 2025-07-09 PROCEDURE — 36415 COLL VENOUS BLD VENIPUNCTURE: CPT | Mod: PN

## 2025-07-09 PROCEDURE — 83970 ASSAY OF PARATHORMONE: CPT | Mod: PN

## 2025-07-09 PROCEDURE — 82306 VITAMIN D 25 HYDROXY: CPT | Mod: PN

## 2025-07-09 PROCEDURE — 84443 ASSAY THYROID STIM HORMONE: CPT | Mod: PN

## 2025-07-09 PROCEDURE — 80053 COMPREHEN METABOLIC PANEL: CPT | Mod: PN

## 2025-07-09 PROCEDURE — 83036 HEMOGLOBIN GLYCOSYLATED A1C: CPT | Mod: PN

## 2025-07-10 LAB
ENDOCRINOLOGIST REVIEW: NORMAL
THYROGLOB AB SERPL IA-ACNC: <1.8 IU/ML
THYROGLOB SERPL-MCNC: 0.1 NG/ML

## 2025-07-11 ENCOUNTER — PATIENT MESSAGE (OUTPATIENT)
Dept: ENDOCRINOLOGY | Facility: CLINIC | Age: 65
End: 2025-07-11
Payer: COMMERCIAL

## 2025-07-11 DIAGNOSIS — E66.09 CLASS 1 OBESITY DUE TO EXCESS CALORIES WITHOUT SERIOUS COMORBIDITY WITH BODY MASS INDEX (BMI) OF 33.0 TO 33.9 IN ADULT: Primary | ICD-10-CM

## 2025-07-11 DIAGNOSIS — E66.811 CLASS 1 OBESITY DUE TO EXCESS CALORIES WITHOUT SERIOUS COMORBIDITY WITH BODY MASS INDEX (BMI) OF 33.0 TO 33.9 IN ADULT: Primary | ICD-10-CM

## 2025-07-11 NOTE — TELEPHONE ENCOUNTER
Component      Latest Ref Rng 7/9/2025   Sodium      136 - 145 mmol/L 142    Potassium      3.5 - 5.1 mmol/L 4.4    Chloride      95 - 110 mmol/L 105    CO2      23 - 29 mmol/L 30 (H)    Glucose      70 - 110 mg/dL 104    BUN      2 - 20 mg/dL 16    Creatinine      0.5 - 1.4 mg/dL 1.1    Calcium      8.7 - 10.5 mg/dL 8.6 (L)    PROTEIN TOTAL      6.0 - 8.4 gm/dL 7.3    Albumin      3.5 - 5.2 g/dL 4.2    BILIRUBIN TOTAL      0.1 - 1.0 mg/dL 0.7    ALP      38 - 126 unit/L 66    AST      15 - 46 unit/L 35    ALT      10 - 44 unit/L 31    Anion Gap      8 - 16 mmol/L 7 (L)    eGFR      >60 mL/min/1.73/m2 >60    Thyroglobulin Antibody, S      <1.8 IU/mL <1.8    Thyroglobulin, Tumor Marker, S      < or = 33 ng/mL 0.1    Thyroglobulin Interpretation SEE COMMENTS    Hemoglobin A1C External      4.0 - 5.6 % 5.9 (H)    Estimated Avg Glucose      68 - 131 mg/dL 123    TSH      0.400 - 4.000 uIU/mL 0.881    Vitamin D      30 - 96 ng/mL 35    PTH      9.0 - 77.0 pg/mL 66.0       Legend:  (H) High  (L) Low

## 2025-08-03 ENCOUNTER — PATIENT MESSAGE (OUTPATIENT)
Dept: ADMINISTRATIVE | Facility: OTHER | Age: 65
End: 2025-08-03
Payer: COMMERCIAL

## 2025-08-26 DIAGNOSIS — E78.5 HYPERLIPIDEMIA, UNSPECIFIED HYPERLIPIDEMIA TYPE: ICD-10-CM

## 2025-08-26 RX ORDER — EVOLOCUMAB 140 MG/ML
140 INJECTION, SOLUTION SUBCUTANEOUS
Qty: 2 ML | Refills: 1 | Status: ACTIVE | OUTPATIENT
Start: 2025-08-26